# Patient Record
Sex: MALE | Race: WHITE | Employment: FULL TIME | URBAN - METROPOLITAN AREA
[De-identification: names, ages, dates, MRNs, and addresses within clinical notes are randomized per-mention and may not be internally consistent; named-entity substitution may affect disease eponyms.]

---

## 2017-01-20 ENCOUNTER — TELEPHONE (OUTPATIENT)
Dept: SURGERY | Facility: CLINIC | Age: 77
End: 2017-01-20

## 2017-01-20 NOTE — TELEPHONE ENCOUNTER
Patient lost his paperwork,and he needs the information again, who dr Garcia He want him to see and what test

## 2017-01-20 NOTE — TELEPHONE ENCOUNTER
Spoke with pt and informed him of the instructions in DARÍO's lov note and explained that he will have to come into the lab to p/u the collection bottle for the 24 hr urine collcetion and then make sure that he completes the collection at least 1 week before

## 2017-02-15 NOTE — TELEPHONE ENCOUNTER
LUCILA please schedule appointment. Per Dr. Arthur Thrasher patient needs appointment before next refill.

## 2017-02-20 ENCOUNTER — APPOINTMENT (OUTPATIENT)
Dept: LAB | Facility: HOSPITAL | Age: 77
End: 2017-02-20
Attending: UROLOGY
Payer: COMMERCIAL

## 2017-02-20 PROCEDURE — 84156 ASSAY OF PROTEIN URINE: CPT | Performed by: UROLOGY

## 2017-02-20 PROCEDURE — 82570 ASSAY OF URINE CREATININE: CPT | Performed by: UROLOGY

## 2017-02-20 PROCEDURE — 84154 ASSAY OF PSA FREE: CPT | Performed by: UROLOGY

## 2017-02-20 PROCEDURE — 84153 ASSAY OF PSA TOTAL: CPT | Performed by: UROLOGY

## 2017-03-01 ENCOUNTER — OFFICE VISIT (OUTPATIENT)
Dept: SURGERY | Facility: CLINIC | Age: 77
End: 2017-03-01

## 2017-03-01 VITALS
SYSTOLIC BLOOD PRESSURE: 116 MMHG | HEIGHT: 68 IN | WEIGHT: 174 LBS | BODY MASS INDEX: 26.37 KG/M2 | RESPIRATION RATE: 16 BRPM | DIASTOLIC BLOOD PRESSURE: 67 MMHG | HEART RATE: 60 BPM

## 2017-03-01 DIAGNOSIS — N52.01 ERECTILE DYSFUNCTION DUE TO ARTERIAL INSUFFICIENCY: ICD-10-CM

## 2017-03-01 DIAGNOSIS — N40.1 BENIGN NON-NODULAR PROSTATIC HYPERPLASIA WITH LOWER URINARY TRACT SYMPTOMS: ICD-10-CM

## 2017-03-01 DIAGNOSIS — R97.20 ELEVATED PSA: ICD-10-CM

## 2017-03-01 DIAGNOSIS — R80.9 PROTEINURIA, UNSPECIFIED TYPE: Primary | ICD-10-CM

## 2017-03-01 PROCEDURE — 99214 OFFICE O/P EST MOD 30 MIN: CPT | Performed by: UROLOGY

## 2017-03-01 PROCEDURE — 99213 OFFICE O/P EST LOW 20 MIN: CPT | Performed by: UROLOGY

## 2017-03-01 RX ORDER — TAMSULOSIN HYDROCHLORIDE 0.4 MG/1
CAPSULE ORAL
Refills: 3 | COMMUNITY
Start: 2017-02-17 | End: 2018-10-01

## 2017-03-01 NOTE — PROGRESS NOTES
HPI:    Patient ID: Creig Canavan is a 68year old male. HPI     1. Nocturia  Patient's AUA score today was 4, mild voiding dysfunction category the same as AUA 4 on chart review (8/31/2016). The patient has weak stream and nocturia 1 per night.   The zone, adjacent to the peripheral zone--had increased blood flow.   benign prostatic tissue and some of biopsy showed  mild chronic inflammation or mild acute and chronic inflammation.  4  negative prostate biopsies in the past, namely in 2003 and then July 90 tablet Rfl: 3   Tadalafil (CIALIS) 20 MG Oral Tab Please take 1-2 hours before planned sexual activity;  do not take with alcohol.  Disp: 10 tablet Rfl: 11   metoprolol Tartrate 25 MG Oral Tab  Disp:  Rfl:    valsartan 320 MG Oral Tab TAKE 1 TABLET BY MO 09/12/2015            ASSESSMENT/PLAN:   1. (R80.9) Proteinuria, unspecified type  (primary encounter diagnosis)  Plan: 8/71/1261 metabolic 24 hour urine protein was 277.2 (mildly elevated; range is 0.0-150.0). Continues elevated, but not severe.  I discus benefits and risks of these medications; the patient understands and would like to continue. I fully discussed benefits, risks and alternatives to the treatment plan. Patient Instructions and Treatment Plan    1.   Continue finasteride 5 mg daily

## 2017-03-01 NOTE — PATIENT INSTRUCTIONS
1.  Continue finasteride 5 mg daily    2. Continue tamsulosin 0.4 mg daily    3. Visit in 6 months. Blood draw for PSA--total and free a few days before the visit.   Please abstain from any and all types of sexual activity for 5 days before the PSA blood

## 2017-03-08 ENCOUNTER — TELEPHONE (OUTPATIENT)
Dept: FAMILY MEDICINE CLINIC | Facility: CLINIC | Age: 77
End: 2017-03-08

## 2017-03-08 ENCOUNTER — PRIOR ORIGINAL RECORDS (OUTPATIENT)
Dept: OTHER | Age: 77
End: 2017-03-08

## 2017-03-08 DIAGNOSIS — I25.10 CORONARY ARTERY DISEASE DUE TO LIPID RICH PLAQUE: Primary | ICD-10-CM

## 2017-03-08 DIAGNOSIS — I25.83 CORONARY ARTERY DISEASE DUE TO LIPID RICH PLAQUE: Primary | ICD-10-CM

## 2017-03-08 NOTE — TELEPHONE ENCOUNTER
Dr. Kerry Kan, for Dr. Shalonda Yates, Pt is currently in Dr. Vazquez's/Cardiology office.  States is there for a routine follow up and was informed needing a referral. Informed pt Dr. Shalonda Yates is out of office today and pt requesting if referral can be signed by other phys

## 2017-03-08 NOTE — TELEPHONE ENCOUNTER
Pt would like a referral to see Dr. Vazquez's/Cardiology. Per pt he is at Dr. Yeni Valadez office for follow up on his stent. Trasferred call to [employer].

## 2017-03-13 RX ORDER — HYDROCHLOROTHIAZIDE 25 MG/1
TABLET ORAL
Qty: 30 TABLET | Refills: 0 | Status: SHIPPED | OUTPATIENT
Start: 2017-03-13 | End: 2017-03-16

## 2017-03-13 NOTE — TELEPHONE ENCOUNTER
Rx request for Hydrochlorothiazide 25 mg. Rx filled per protocol. Reminder call placed to pt for f/u appt for additional refills.      Hypertensive Medications  Protocol Criteria:  · Appointment scheduled in the past 6 months or in the next 3 months  · BMP

## 2017-03-23 RX ORDER — HYDROCHLOROTHIAZIDE 25 MG/1
TABLET ORAL
Qty: 30 TABLET | Refills: 0 | Status: SHIPPED | OUTPATIENT
Start: 2017-03-23 | End: 2017-03-30

## 2017-03-23 NOTE — TELEPHONE ENCOUNTER
Hypertensive Medications  Protocol Criteria:  · Appointment scheduled in the past 6 months or in the next 3 months  · BMP or CMP in the past 12 months  · Creatinine result < 2  Recent Visits       Provider Department Primary Dx    9 months ago Ladonna Coles

## 2017-03-24 ENCOUNTER — TELEPHONE (OUTPATIENT)
Dept: FAMILY MEDICINE CLINIC | Facility: CLINIC | Age: 77
End: 2017-03-24

## 2017-03-24 RX ORDER — VALSARTAN 320 MG/1
TABLET ORAL
Qty: 30 TABLET | Refills: 0 | Status: SHIPPED | OUTPATIENT
Start: 2017-03-24 | End: 2017-03-30

## 2017-03-24 RX ORDER — AMLODIPINE BESYLATE 5 MG/1
5 TABLET ORAL DAILY
Qty: 30 TABLET | Refills: 0 | Status: SHIPPED | OUTPATIENT
Start: 2017-03-24 | End: 2017-03-30

## 2017-03-24 RX ORDER — AMLODIPINE BESYLATE 5 MG/1
TABLET ORAL
Qty: 90 TABLET | Refills: 0 | Status: SHIPPED | OUTPATIENT
Start: 2017-03-24 | End: 2017-03-30

## 2017-03-24 NOTE — TELEPHONE ENCOUNTER
LUCILA- Please call and schedule an appointment for patient with Dr. Tiny Delcid. See message below.

## 2017-03-27 RX ORDER — VALSARTAN 320 MG/1
TABLET ORAL
Qty: 90 TABLET | Refills: 0 | Status: SHIPPED | OUTPATIENT
Start: 2017-03-27 | End: 2017-03-30

## 2017-03-28 NOTE — TELEPHONE ENCOUNTER
Hypertensive Medications  Protocol Criteria:  · Appointment scheduled in the past 6 months or in the next 3 months  · BMP or CMP in the past 12 months  · Creatinine result < 2  Recent Visits       Provider Department Primary Dx    9 months ago Maxine Aguirre

## 2017-03-30 ENCOUNTER — OFFICE VISIT (OUTPATIENT)
Dept: FAMILY MEDICINE CLINIC | Facility: CLINIC | Age: 77
End: 2017-03-30

## 2017-03-30 VITALS
SYSTOLIC BLOOD PRESSURE: 134 MMHG | WEIGHT: 168 LBS | BODY MASS INDEX: 25.46 KG/M2 | TEMPERATURE: 97 F | HEIGHT: 68 IN | DIASTOLIC BLOOD PRESSURE: 58 MMHG | HEART RATE: 67 BPM | RESPIRATION RATE: 14 BRPM

## 2017-03-30 DIAGNOSIS — Z00.00 ADULT GENERAL MEDICAL EXAM: Primary | ICD-10-CM

## 2017-03-30 DIAGNOSIS — E78.2 MIXED HYPERLIPIDEMIA: ICD-10-CM

## 2017-03-30 DIAGNOSIS — N40.1 BENIGN NON-NODULAR PROSTATIC HYPERPLASIA WITH LOWER URINARY TRACT SYMPTOMS: ICD-10-CM

## 2017-03-30 DIAGNOSIS — I10 ESSENTIAL HYPERTENSION, BENIGN: ICD-10-CM

## 2017-03-30 DIAGNOSIS — I25.10 CORONARY ARTERY DISEASE INVOLVING NATIVE CORONARY ARTERY OF NATIVE HEART WITHOUT ANGINA PECTORIS: ICD-10-CM

## 2017-03-30 DIAGNOSIS — E11.9 CONTROLLED TYPE 2 DIABETES MELLITUS WITHOUT COMPLICATION, WITHOUT LONG-TERM CURRENT USE OF INSULIN (HCC): ICD-10-CM

## 2017-03-30 PROCEDURE — 99397 PER PM REEVAL EST PAT 65+ YR: CPT | Performed by: FAMILY MEDICINE

## 2017-03-30 RX ORDER — ATORVASTATIN CALCIUM 80 MG/1
80 TABLET, FILM COATED ORAL
Qty: 90 TABLET | Refills: 3 | Status: SHIPPED | OUTPATIENT
Start: 2017-03-30 | End: 2017-10-02

## 2017-03-30 RX ORDER — AMLODIPINE BESYLATE 5 MG/1
TABLET ORAL
Qty: 90 TABLET | Refills: 3 | Status: SHIPPED | OUTPATIENT
Start: 2017-03-30 | End: 2017-10-02

## 2017-03-30 RX ORDER — HYDROCHLOROTHIAZIDE 25 MG/1
TABLET ORAL
Qty: 90 TABLET | Refills: 3 | Status: SHIPPED | OUTPATIENT
Start: 2017-03-30 | End: 2017-10-02

## 2017-03-30 RX ORDER — VALSARTAN 320 MG/1
TABLET ORAL
Qty: 90 TABLET | Refills: 3 | Status: SHIPPED | OUTPATIENT
Start: 2017-03-30 | End: 2017-10-02

## 2017-03-30 NOTE — PROGRESS NOTES
Patient ID: Nelson Welch is a 68year old male. HPI  Patient presents with:  Routine Physical    He quit tobacco and alcohol in 2007. He is . He is doing very well. He sees his cardiologist and urologist yearly.   He does have diabetes but PHURINE 6.0 08/31/2016   PROUR 100* 08/31/2016   UROBILINOGEN <2.0 08/31/2016   NITRITE Negative 08/31/2016   LEUUR Negative 08/31/2016   ASCACIDURINE Negative 08/31/2016   WBCUR 1 08/31/2016   RBCUR 1 08/31/2016   HYALCASTURN 1 08/31/2016   BACUR Few* 0 Genitourinary: Negative for hematuria and difficulty urinating. Musculoskeletal: Negative for joint swelling. Skin: Negative for rash. Allergic/Immunologic: Negative for environmental allergies and food allergies.    Neurological: Negative for dizzi EACH DAY Disp:  Rfl: 3   METFORMIN  MG Oral Tab TAKE 1 TABLET BY MOUTH DAILY WITH DINNER. Disp: 90 tablet Rfl: 0   finasteride 5 MG Oral Tab Take 1 tablet (5 mg total) by mouth once daily.  Disp: 90 tablet Rfl: 3   Tadalafil (CIALIS) 20 MG Oral Tab P (76.204 kg)        Blood pressure 152/77, pulse 67, temperature 97.3 °F (36.3 °C), temperature source Oral, resp. rate 14, height 5' 8\" (1.727 m), weight 168 lb (76.204 kg).          ASSESSMENT/PLAN:     Diagnoses and all orders for this visit:    Adult chelsea

## 2017-04-01 ENCOUNTER — PRIOR ORIGINAL RECORDS (OUTPATIENT)
Dept: OTHER | Age: 77
End: 2017-04-01

## 2017-04-01 ENCOUNTER — LAB ENCOUNTER (OUTPATIENT)
Dept: LAB | Age: 77
End: 2017-04-01
Attending: FAMILY MEDICINE
Payer: COMMERCIAL

## 2017-04-01 DIAGNOSIS — R97.20 ELEVATED PSA: ICD-10-CM

## 2017-04-01 DIAGNOSIS — E11.9 CONTROLLED TYPE 2 DIABETES MELLITUS WITHOUT COMPLICATION, WITHOUT LONG-TERM CURRENT USE OF INSULIN (HCC): ICD-10-CM

## 2017-04-01 PROCEDURE — 80061 LIPID PANEL: CPT

## 2017-04-01 PROCEDURE — 80048 BASIC METABOLIC PNL TOTAL CA: CPT

## 2017-04-01 PROCEDURE — 36415 COLL VENOUS BLD VENIPUNCTURE: CPT

## 2017-04-01 PROCEDURE — 83036 HEMOGLOBIN GLYCOSYLATED A1C: CPT

## 2017-04-01 PROCEDURE — 85025 COMPLETE CBC W/AUTO DIFF WBC: CPT

## 2017-04-01 PROCEDURE — 82043 UR ALBUMIN QUANTITATIVE: CPT

## 2017-04-01 PROCEDURE — 84450 TRANSFERASE (AST) (SGOT): CPT

## 2017-04-01 PROCEDURE — 84154 ASSAY OF PSA FREE: CPT

## 2017-04-01 PROCEDURE — 82570 ASSAY OF URINE CREATININE: CPT

## 2017-04-01 PROCEDURE — 84460 ALANINE AMINO (ALT) (SGPT): CPT

## 2017-04-01 PROCEDURE — 84153 ASSAY OF PSA TOTAL: CPT

## 2017-04-13 DIAGNOSIS — R97.20 ELEVATED PSA: Primary | ICD-10-CM

## 2017-04-18 ENCOUNTER — PRIOR ORIGINAL RECORDS (OUTPATIENT)
Dept: OTHER | Age: 77
End: 2017-04-18

## 2017-04-18 ENCOUNTER — MYAURORA ACCOUNT LINK (OUTPATIENT)
Dept: OTHER | Age: 77
End: 2017-04-18

## 2017-04-18 LAB
ALT (SGPT): 16 U/L
AST (SGOT): 17 U/L
BUN: 12 MG/DL
CALCIUM: 9.2 MG/DL
CHLORIDE: 102 MEQ/L
CHOLESTEROL, TOTAL: 82 MG/DL
CREATININE, SERUM: 0.71 MG/DL
GLUCOSE: 96 MG/DL
GLUCOSE: 96 MG/DL
HDL CHOLESTEROL: 36 MG/DL
HEMATOCRIT: 39.5 %
HEMOGLOBIN A1C: 6.3 %
HEMOGLOBIN: 12.8 G/DL
LDL CHOLESTEROL: 35 MG/DL
NON-HDL CHOLESTEROL: 46 MG/DL
PLATELETS: 215 K/UL
POTASSIUM, SERUM: 3.8 MEQ/L
RED BLOOD COUNT: 4.78 X 10-6/U
SODIUM: 141 MEQ/L
TRIGLYCERIDES: 57 MG/DL
WHITE BLOOD COUNT: 8.3 X 10-3/U

## 2017-05-16 RX ORDER — HYDROCHLOROTHIAZIDE 25 MG/1
TABLET ORAL
Qty: 30 TABLET | Refills: 0 | OUTPATIENT
Start: 2017-05-16

## 2017-07-03 ENCOUNTER — TELEPHONE (OUTPATIENT)
Dept: FAMILY MEDICINE CLINIC | Facility: CLINIC | Age: 77
End: 2017-07-03

## 2017-07-03 DIAGNOSIS — I25.10 CORONARY ARTERY DISEASE INVOLVING NATIVE CORONARY ARTERY OF NATIVE HEART WITHOUT ANGINA PECTORIS: ICD-10-CM

## 2017-07-03 NOTE — TELEPHONE ENCOUNTER
Pt would like to speak with the doctor or RN about his metoprolol Medication. Pt states that he takes 1 tablet in the morning and one tablet in the evening. Per pt the pharmacy states that he takes 1/2 in the morning and 1/2 in the evening.  Pt would like t

## 2017-07-06 NOTE — TELEPHONE ENCOUNTER
He should be on one half tablet in the morning and one half tablet at night of the metoprolol. Have him do this from now on. I will see him in 1 month as I want to make sure his blood pressure is controlled.

## 2017-07-06 NOTE — TELEPHONE ENCOUNTER
Pt stts has been taking 1 tablet of Metoprolol BID but pharmacist stts he should be taking only 1/2 pill BID. Pt would like this clarified. BP running normal.Please advise. Pt also needs a refill please.

## 2017-07-07 ENCOUNTER — TELEPHONE (OUTPATIENT)
Dept: INTERNAL MEDICINE CLINIC | Facility: CLINIC | Age: 77
End: 2017-07-07

## 2017-07-07 NOTE — TELEPHONE ENCOUNTER
LMTCB    Please transfer [id number] anytime. Thank you. To relay MD message below and make 1 month f/u visit.  Pt has future OV appt 10/2/17

## 2017-07-07 NOTE — TELEPHONE ENCOUNTER
Spoke with patient (identified name and ), results reviewed and agrees with plan.   Prescription refilled and appointment given for  at 9:10 am.

## 2017-07-08 DIAGNOSIS — N52.01 ERECTILE DYSFUNCTION DUE TO ARTERIAL INSUFFICIENCY: ICD-10-CM

## 2017-07-08 DIAGNOSIS — R80.9 PROTEINURIA: ICD-10-CM

## 2017-07-08 DIAGNOSIS — R35.1 NOCTURIA: ICD-10-CM

## 2017-07-08 DIAGNOSIS — R97.20 ELEVATED PSA: ICD-10-CM

## 2017-07-08 DIAGNOSIS — N40.1 BENIGN NON-NODULAR PROSTATIC HYPERPLASIA WITH LOWER URINARY TRACT SYMPTOMS: ICD-10-CM

## 2017-07-10 RX ORDER — FINASTERIDE 5 MG/1
5 TABLET, FILM COATED ORAL
Qty: 90 TABLET | Refills: 3 | Status: SHIPPED | OUTPATIENT
Start: 2017-07-10 | End: 2018-07-03

## 2017-07-10 RX ORDER — TAMSULOSIN HYDROCHLORIDE 0.4 MG/1
0.4 CAPSULE ORAL DAILY
Qty: 90 CAPSULE | Refills: 3 | Status: SHIPPED | OUTPATIENT
Start: 2017-07-10 | End: 2018-07-03

## 2017-07-10 NOTE — TELEPHONE ENCOUNTER
Dr. Elias Becerra, pt LOV 3/1/7 and pt pharmacy requesting refills on tamsulosin and finasteride if you agree please review and sign med, thank you!

## 2017-08-12 ENCOUNTER — LAB ENCOUNTER (OUTPATIENT)
Dept: LAB | Age: 77
End: 2017-08-12
Attending: FAMILY MEDICINE
Payer: COMMERCIAL

## 2017-08-12 ENCOUNTER — OFFICE VISIT (OUTPATIENT)
Dept: FAMILY MEDICINE CLINIC | Facility: CLINIC | Age: 77
End: 2017-08-12

## 2017-08-12 VITALS
BODY MASS INDEX: 25.43 KG/M2 | HEIGHT: 68 IN | DIASTOLIC BLOOD PRESSURE: 79 MMHG | SYSTOLIC BLOOD PRESSURE: 141 MMHG | TEMPERATURE: 99 F | HEART RATE: 58 BPM | WEIGHT: 167.81 LBS | RESPIRATION RATE: 14 BRPM

## 2017-08-12 DIAGNOSIS — E11.9 CONTROLLED TYPE 2 DIABETES MELLITUS WITHOUT COMPLICATION, WITHOUT LONG-TERM CURRENT USE OF INSULIN (HCC): Primary | ICD-10-CM

## 2017-08-12 DIAGNOSIS — R97.20 ELEVATED PSA: ICD-10-CM

## 2017-08-12 DIAGNOSIS — D64.9 ANEMIA, UNSPECIFIED TYPE: ICD-10-CM

## 2017-08-12 DIAGNOSIS — E11.9 CONTROLLED TYPE 2 DIABETES MELLITUS WITHOUT COMPLICATION, WITHOUT LONG-TERM CURRENT USE OF INSULIN (HCC): ICD-10-CM

## 2017-08-12 LAB
ALT SERPL-CCNC: 18 U/L (ref 17–63)
ANION GAP SERPL CALC-SCNC: 7 MMOL/L (ref 0–18)
AST SERPL-CCNC: 18 U/L (ref 15–41)
BASOPHILS # BLD: 0.1 K/UL (ref 0–0.2)
BASOPHILS NFR BLD: 1 %
BUN SERPL-MCNC: 12 MG/DL (ref 8–20)
BUN/CREAT SERPL: 13.8 (ref 10–20)
CALCIUM SERPL-MCNC: 9.1 MG/DL (ref 8.5–10.5)
CHLORIDE SERPL-SCNC: 102 MMOL/L (ref 95–110)
CO2 SERPL-SCNC: 31 MMOL/L (ref 22–32)
CREAT SERPL-MCNC: 0.87 MG/DL (ref 0.5–1.5)
EOSINOPHIL # BLD: 0.4 K/UL (ref 0–0.7)
EOSINOPHIL NFR BLD: 5 %
ERYTHROCYTE [DISTWIDTH] IN BLOOD BY AUTOMATED COUNT: 15.3 % (ref 11–15)
FERRITIN SERPL IA-MCNC: 52 NG/ML (ref 24–336)
GLUCOSE SERPL-MCNC: 99 MG/DL (ref 70–99)
HCT VFR BLD AUTO: 40.2 % (ref 41–52)
HGB BLD-MCNC: 13.5 G/DL (ref 13.5–17.5)
IRON SATN MFR SERPL: 11 % (ref 20–55)
IRON SERPL-MCNC: 37 MCG/DL (ref 45–182)
LYMPHOCYTES # BLD: 1.4 K/UL (ref 1–4)
LYMPHOCYTES NFR BLD: 17 %
MCH RBC QN AUTO: 27.6 PG (ref 27–32)
MCHC RBC AUTO-ENTMCNC: 33.5 G/DL (ref 32–37)
MCV RBC AUTO: 82.2 FL (ref 80–100)
MONOCYTES # BLD: 0.7 K/UL (ref 0–1)
MONOCYTES NFR BLD: 9 %
NEUTROPHILS # BLD AUTO: 5.6 K/UL (ref 1.8–7.7)
NEUTROPHILS NFR BLD: 68 %
OSMOLALITY UR CALC.SUM OF ELEC: 290 MOSM/KG (ref 275–295)
PLATELET # BLD AUTO: 229 K/UL (ref 140–400)
PMV BLD AUTO: 9.1 FL (ref 7.4–10.3)
POTASSIUM SERPL-SCNC: 3.9 MMOL/L (ref 3.3–5.1)
PSA FREE MFR SERPL: 31 %
PSA FREE SERPL-MCNC: 0.62 NG/ML
PSA SERPL-MCNC: 2 NG/ML (ref 0–4)
RBC # BLD AUTO: 4.89 M/UL (ref 4.5–5.9)
SODIUM SERPL-SCNC: 140 MMOL/L (ref 136–144)
TIBC SERPL-MCNC: 346 MCG/DL (ref 228–428)
TRANSFERRIN SERPL-MCNC: 262 MG/DL (ref 180–329)
WBC # BLD AUTO: 8.2 K/UL (ref 4–11)

## 2017-08-12 PROCEDURE — 99214 OFFICE O/P EST MOD 30 MIN: CPT | Performed by: FAMILY MEDICINE

## 2017-08-12 PROCEDURE — 84154 ASSAY OF PSA FREE: CPT

## 2017-08-12 PROCEDURE — 84466 ASSAY OF TRANSFERRIN: CPT

## 2017-08-12 PROCEDURE — 84153 ASSAY OF PSA TOTAL: CPT

## 2017-08-12 PROCEDURE — 85025 COMPLETE CBC W/AUTO DIFF WBC: CPT

## 2017-08-12 PROCEDURE — 84450 TRANSFERASE (AST) (SGOT): CPT

## 2017-08-12 PROCEDURE — 84460 ALANINE AMINO (ALT) (SGPT): CPT

## 2017-08-12 PROCEDURE — 80048 BASIC METABOLIC PNL TOTAL CA: CPT

## 2017-08-12 PROCEDURE — 83036 HEMOGLOBIN GLYCOSYLATED A1C: CPT

## 2017-08-12 PROCEDURE — 36415 COLL VENOUS BLD VENIPUNCTURE: CPT

## 2017-08-12 PROCEDURE — 83540 ASSAY OF IRON: CPT

## 2017-08-12 PROCEDURE — 99212 OFFICE O/P EST SF 10 MIN: CPT | Performed by: FAMILY MEDICINE

## 2017-08-12 PROCEDURE — 82728 ASSAY OF FERRITIN: CPT

## 2017-08-12 NOTE — PROGRESS NOTES
Patient ID: Gio Guzman is a 68year old male. HPI  Patient presents with:  Diabetes  Hyperlipidemia  Hypertension    He had a colonoscopy in the past and states he is up-to-date. He does not notice any blood in the stool or black stools.   He stat COLORUR Yellow 08/31/2016   CLARITY Clear 08/31/2016   SPECGRAVITY 1.019 08/31/2016   GLUUR Negative 08/31/2016   BILUR Negative 08/31/2016   KETUR Negative 08/31/2016   BLOODURINE Negative 08/31/2016   PHURINE 6.0 08/31/2016   PROUR 100 (A) 08/31/2016 Negative for appetite change, diaphoresis, fatigue and fever. Respiratory: Negative for cough, chest tightness and shortness of breath.     Neurological: Negative for dizziness, tremors, syncope, facial asymmetry, speech difficulty, weakness, numbness and Oral Tab Take 1 tablet by mouth 2 (two) times daily. Disp: 180 tablet Rfl: 0     Allergies:No Known Allergies   PHYSICAL EXAM:   Physical Exam  Blood pressure 141/79, pulse 58, temperature 98.9 °F (37.2 °C), temperature source Oral, resp.  rate 14, height 5 member understands and agrees to plan.          Valentino Bali, DO  8/12/2017

## 2017-08-13 LAB — HBA1C MFR BLD: 6.3 % (ref 4–6)

## 2017-09-01 ENCOUNTER — OFFICE VISIT (OUTPATIENT)
Dept: SURGERY | Facility: CLINIC | Age: 77
End: 2017-09-01

## 2017-09-01 VITALS
HEART RATE: 50 BPM | DIASTOLIC BLOOD PRESSURE: 73 MMHG | SYSTOLIC BLOOD PRESSURE: 145 MMHG | RESPIRATION RATE: 18 BRPM | TEMPERATURE: 98 F

## 2017-09-01 DIAGNOSIS — R35.1 NOCTURIA: ICD-10-CM

## 2017-09-01 DIAGNOSIS — N40.1 BENIGN PROSTATIC HYPERPLASIA WITH URINARY FREQUENCY: ICD-10-CM

## 2017-09-01 DIAGNOSIS — R35.0 BENIGN PROSTATIC HYPERPLASIA WITH URINARY FREQUENCY: ICD-10-CM

## 2017-09-01 DIAGNOSIS — R97.20 ELEVATED PSA: Primary | ICD-10-CM

## 2017-09-01 DIAGNOSIS — N52.01 ERECTILE DYSFUNCTION DUE TO ARTERIAL INSUFFICIENCY: ICD-10-CM

## 2017-09-01 DIAGNOSIS — R80.9 PROTEINURIA, UNSPECIFIED TYPE: ICD-10-CM

## 2017-09-01 PROCEDURE — 99212 OFFICE O/P EST SF 10 MIN: CPT | Performed by: UROLOGY

## 2017-09-01 PROCEDURE — 99214 OFFICE O/P EST MOD 30 MIN: CPT | Performed by: UROLOGY

## 2017-09-01 NOTE — PATIENT INSTRUCTIONS
1.  Continue tamsulosin 0.4 mg daily    2. Continue finasteride 5 mg daily    3. You have decided to follow the recommendations of the American urologic Association and stop PSA testing because they do not recommend PSA testing after the age of 71.   Your prevent problems. Several tests can diagnose BPH. These include:  · Digital rectal exam. During this procedure, your provider puts a gloved, greased (lubricated) finger into your rectum to check the size of your prostate. · Imaging tests.  X-rays and othe on your age, overall health, and other factors. BPH and Prostate Cancer  BPH and prostate cancer share some symptoms. That’s why it’s important to talk with your provider about your symptoms. Men with BPH aren’t more likely to develop this cancer.  But the

## 2017-09-01 NOTE — PROGRESS NOTES
HPI:    Patient ID: Ginna Renteriapool is a 68year old male. HPI     1. Nocturia  Patient's AUA score today was 1, beginning of the mild voiding dysfunction category, better than the score of 4 that he had last visit 3/1/17 on chart review.   The patient h of prostate [address] [place] [address] February 2, 2009--- volume was 86.1 cc, slightly increased compared to [address] [place] [address] ultrasound of 2008 when it was 79.7 cc.  1.8 cm hypoechoic area left transition zone, adjacent to the peripheral zone--had increased blood flow.   benign prostatic t Disp: 4 patch Rfl: 6   valsartan 320 MG Oral Tab TAKE 1 TABLET BY MOUTH ONCE DAILY. Disp: 90 tablet Rfl: 3   AmLODIPine Besylate 5 MG Oral Tab TAKE 1 TABLET (5 MG TOTAL) BY MOUTH ONCE DAILY.  Disp: 90 tablet Rfl: 3   hydrochlorothiazide 25 MG Oral Tab TAKE 8%.  4/34/7080 metabolic 24 hour urine protein = 277.2 (mildly elevated; range is 0.0-150.0)  2/20/2017 PSA = 2.1 x 2 = 4.2 (adjusted for Finasteride),   34 % free PSA (8 % probability of prostate cancer)  10/18/2014 PSA = 3.0 x 2 = 6.0 (adjusted for Finas with lower urinary tract symptoms  Plan: On CHELLE today, the prostate was 45-50 grams, 3-4+ enlarged, no nodules. Problem is stable on finasteride. I discussed benefits and risks of Finasteride; the patient understands and would like to continue.      4. (N .    Meds This Visit:    No prescriptions requested or ordered in this encounter    Imaging & Referrals:  None

## 2017-09-05 ENCOUNTER — APPOINTMENT (OUTPATIENT)
Dept: LAB | Age: 77
End: 2017-09-05
Attending: FAMILY MEDICINE
Payer: COMMERCIAL

## 2017-09-05 DIAGNOSIS — D64.9 ANEMIA, UNSPECIFIED TYPE: ICD-10-CM

## 2017-09-05 PROCEDURE — 82274 ASSAY TEST FOR BLOOD FECAL: CPT

## 2017-09-08 LAB — HEMOCCULT STL QL: NEGATIVE

## 2017-10-02 ENCOUNTER — OFFICE VISIT (OUTPATIENT)
Dept: FAMILY MEDICINE CLINIC | Facility: CLINIC | Age: 77
End: 2017-10-02

## 2017-10-02 VITALS
TEMPERATURE: 98 F | HEART RATE: 56 BPM | WEIGHT: 165 LBS | HEIGHT: 68 IN | DIASTOLIC BLOOD PRESSURE: 58 MMHG | BODY MASS INDEX: 25.01 KG/M2 | SYSTOLIC BLOOD PRESSURE: 124 MMHG

## 2017-10-02 DIAGNOSIS — L85.3 XEROSIS OF SKIN: ICD-10-CM

## 2017-10-02 DIAGNOSIS — I10 ESSENTIAL HYPERTENSION, BENIGN: Primary | ICD-10-CM

## 2017-10-02 DIAGNOSIS — L98.9 SKIN LESION OF FACE: ICD-10-CM

## 2017-10-02 DIAGNOSIS — Z23 NEED FOR VACCINATION: ICD-10-CM

## 2017-10-02 DIAGNOSIS — E11.9 CONTROLLED TYPE 2 DIABETES MELLITUS WITHOUT COMPLICATION, WITHOUT LONG-TERM CURRENT USE OF INSULIN (HCC): ICD-10-CM

## 2017-10-02 DIAGNOSIS — E78.2 MIXED HYPERLIPIDEMIA: ICD-10-CM

## 2017-10-02 DIAGNOSIS — R51.9 PAIN IN FACE: ICD-10-CM

## 2017-10-02 DIAGNOSIS — I25.10 CORONARY ARTERY DISEASE INVOLVING NATIVE CORONARY ARTERY OF NATIVE HEART WITHOUT ANGINA PECTORIS: ICD-10-CM

## 2017-10-02 PROCEDURE — 90653 IIV ADJUVANT VACCINE IM: CPT | Performed by: FAMILY MEDICINE

## 2017-10-02 PROCEDURE — 90471 IMMUNIZATION ADMIN: CPT | Performed by: FAMILY MEDICINE

## 2017-10-02 PROCEDURE — 90472 IMMUNIZATION ADMIN EACH ADD: CPT | Performed by: FAMILY MEDICINE

## 2017-10-02 PROCEDURE — 90670 PCV13 VACCINE IM: CPT | Performed by: FAMILY MEDICINE

## 2017-10-02 PROCEDURE — 99214 OFFICE O/P EST MOD 30 MIN: CPT | Performed by: FAMILY MEDICINE

## 2017-10-02 RX ORDER — ATORVASTATIN CALCIUM 80 MG/1
80 TABLET, FILM COATED ORAL
Qty: 90 TABLET | Refills: 3 | Status: SHIPPED | OUTPATIENT
Start: 2017-10-02 | End: 2018-02-21

## 2017-10-02 RX ORDER — HYDROCHLOROTHIAZIDE 25 MG/1
TABLET ORAL
Qty: 90 TABLET | Refills: 3 | Status: SHIPPED | OUTPATIENT
Start: 2017-10-02 | End: 2018-05-26

## 2017-10-02 RX ORDER — AMLODIPINE BESYLATE 5 MG/1
TABLET ORAL
Qty: 90 TABLET | Refills: 3 | Status: SHIPPED | OUTPATIENT
Start: 2017-10-02 | End: 2018-05-26

## 2017-10-02 RX ORDER — VALSARTAN 320 MG/1
TABLET ORAL
Qty: 90 TABLET | Refills: 3 | Status: SHIPPED | OUTPATIENT
Start: 2017-10-02 | End: 2018-05-26

## 2017-10-02 NOTE — PROGRESS NOTES
Patient ID: Amy Lindquist is a 68year old male. HPI  Patient presents with:  Hypertension  He is here for his hypertension. He states he is doing very well. He only needs to see his cardiologist yearly now. He has 2 stents.   He has had a colonosc MOUTH ONCE DAILY. Disp: 90 tablet Rfl: 3   hydrochlorothiazide 25 MG Oral Tab TAKE 1 TABLET (25 MG TOTAL) BY MOUTH DAILY. Disp: 90 tablet Rfl: 3   MetFORMIN HCl 500 MG Oral Tab TAKE 1 TABLET BY MOUTH DAILY WITH DINNER.  Disp: 90 tablet Rfl: 3   Atorvastatin Diagnoses and all orders for this visit:    Essential hypertension, benign  -     CloNIDine HCl 0.1 MG/24HR Transdermal Patch Weekly; APPLY 1 PATCH WEEKLY AS DIRECTED.  -     valsartan 320 MG Oral Tab; TAKE 1 TABLET BY MOUTH ONCE DAILY.   -     AmLODIPi

## 2017-10-03 ENCOUNTER — OFFICE VISIT (OUTPATIENT)
Dept: OTOLARYNGOLOGY | Facility: CLINIC | Age: 77
End: 2017-10-03

## 2017-10-03 VITALS
DIASTOLIC BLOOD PRESSURE: 54 MMHG | BODY MASS INDEX: 26.07 KG/M2 | TEMPERATURE: 97 F | HEIGHT: 68 IN | WEIGHT: 172 LBS | SYSTOLIC BLOOD PRESSURE: 134 MMHG

## 2017-10-03 DIAGNOSIS — J34.89 LESION OF NOSE: Primary | ICD-10-CM

## 2017-10-03 PROCEDURE — 99212 OFFICE O/P EST SF 10 MIN: CPT | Performed by: OTOLARYNGOLOGY

## 2017-10-03 PROCEDURE — 11311 SHAVE SKIN LESION 0.6-1.0 CM: CPT | Performed by: OTOLARYNGOLOGY

## 2017-10-03 PROCEDURE — 99243 OFF/OP CNSLTJ NEW/EST LOW 30: CPT | Performed by: OTOLARYNGOLOGY

## 2017-10-03 NOTE — PROGRESS NOTES
Amy Lindquist is a 68year old male. Patient presents with:  Consult: mole right side  of nose       HISTORY OF PRESENT ILLNESS    He presents with a long history of an enlarging lesion of the right eyebrow extending onto the nasal skin.   He is having d irregular heartbeat/palpitations and syncope. GI Negative Abdominal pain and diarrhea. Endocrine Negative Cold intolerance and heat intolerance. Neuro Negative Tremors. Psych Negative Anxiety and depression.    Integumentary Negative Frequent skin i Xylocaine with 1-100,000 epinephrine. Using aseptic technique shave biopsy was performed removing this 1 cm eyebrow lesion. This is sent to pathology in formalin. The base was cauterized with silver nitrate. No complications were noted.       Current Ou Rosalino Mahmood MD    10/3/2017    3:33 PM

## 2018-02-21 DIAGNOSIS — E78.2 MIXED HYPERLIPIDEMIA: ICD-10-CM

## 2018-02-21 NOTE — TELEPHONE ENCOUNTER
Pharmacy calling due to patient has script for     Current Outpatient Prescriptions:  atorvastatin 80 MG Oral Tab Take 1 tablet (80 mg total) by mouth once daily. Disp: 90 tablet Rfl: 3     Ins only covers 90 day so requesting 90 day RX be sent .      Cynthia

## 2018-02-22 RX ORDER — ATORVASTATIN CALCIUM 80 MG/1
80 TABLET, FILM COATED ORAL
Qty: 90 TABLET | Refills: 0 | Status: SHIPPED | OUTPATIENT
Start: 2018-02-22 | End: 2018-05-26

## 2018-02-22 NOTE — TELEPHONE ENCOUNTER
Refilled per written protocol--for 90-day.     Cholesterol Medications  Protocol Criteria:  · Appointment scheduled in the past 12 months or in the next 3 months  · ALT & LDL on file in the past 12 months  · ALT result < 80  · LDL result <130   Recent Outpa

## 2018-03-15 ENCOUNTER — MYAURORA ACCOUNT LINK (OUTPATIENT)
Dept: OTHER | Age: 78
End: 2018-03-15

## 2018-03-15 ENCOUNTER — TELEPHONE (OUTPATIENT)
Dept: FAMILY MEDICINE CLINIC | Facility: CLINIC | Age: 78
End: 2018-03-15

## 2018-03-15 ENCOUNTER — PRIOR ORIGINAL RECORDS (OUTPATIENT)
Dept: OTHER | Age: 78
End: 2018-03-15

## 2018-03-15 DIAGNOSIS — I25.83 CORONARY ATHEROSCLEROSIS DUE TO LIPID RICH PLAQUE: Primary | ICD-10-CM

## 2018-03-15 DIAGNOSIS — I25.10 CORONARY ATHEROSCLEROSIS DUE TO LIPID RICH PLAQUE: Primary | ICD-10-CM

## 2018-03-15 NOTE — TELEPHONE ENCOUNTER
Pt called and requested a referral to see Dr Russ Flynn. Pended referral.  Please review diagnois and sign off if you approve. Thank you!  Phoebe Hill [phone] [facility]

## 2018-03-17 ENCOUNTER — PRIOR ORIGINAL RECORDS (OUTPATIENT)
Dept: OTHER | Age: 78
End: 2018-03-17

## 2018-03-17 ENCOUNTER — LAB ENCOUNTER (OUTPATIENT)
Dept: LAB | Age: 78
End: 2018-03-17
Attending: INTERNAL MEDICINE
Payer: COMMERCIAL

## 2018-03-17 DIAGNOSIS — E78.2 MIXED HYPERLIPIDEMIA: Primary | ICD-10-CM

## 2018-03-17 LAB
ALT SERPL-CCNC: 22 U/L (ref 17–63)
AST SERPL-CCNC: 20 U/L (ref 15–41)
CHOLEST SERPL-MCNC: 86 MG/DL (ref 110–200)
CK SERPL-CCNC: 82 U/L (ref 49–397)
HDLC SERPL-MCNC: 48 MG/DL
LDLC SERPL CALC-MCNC: 29 MG/DL (ref 0–99)
NONHDLC SERPL-MCNC: 38 MG/DL
TRIGL SERPL-MCNC: 44 MG/DL (ref 1–149)

## 2018-03-17 PROCEDURE — 80061 LIPID PANEL: CPT

## 2018-03-17 PROCEDURE — 82550 ASSAY OF CK (CPK): CPT

## 2018-03-17 PROCEDURE — 36415 COLL VENOUS BLD VENIPUNCTURE: CPT

## 2018-03-17 PROCEDURE — 84460 ALANINE AMINO (ALT) (SGPT): CPT

## 2018-03-17 PROCEDURE — 84450 TRANSFERASE (AST) (SGOT): CPT

## 2018-03-19 LAB
ALT (SGPT): 22 U/L
AST (SGOT): 20 U/L
CHOLESTEROL, TOTAL: 86 MG/DL
CREATININE KINASE: 82 U/L
HDL CHOLESTEROL: 48 MG/DL
LDL CHOLESTEROL: 29 MG/DL
TRIGLYCERIDES: 44 MG/DL

## 2018-03-26 ENCOUNTER — PRIOR ORIGINAL RECORDS (OUTPATIENT)
Dept: OTHER | Age: 78
End: 2018-03-26

## 2018-03-26 DIAGNOSIS — I25.10 CORONARY ARTERY DISEASE INVOLVING NATIVE CORONARY ARTERY OF NATIVE HEART WITHOUT ANGINA PECTORIS: ICD-10-CM

## 2018-03-29 NOTE — TELEPHONE ENCOUNTER
LUCILA- Please call and schedule an appointment for patient with Dr. Renie Closs. See message below.

## 2018-05-26 ENCOUNTER — LAB ENCOUNTER (OUTPATIENT)
Dept: LAB | Age: 78
End: 2018-05-26
Attending: FAMILY MEDICINE
Payer: COMMERCIAL

## 2018-05-26 ENCOUNTER — OFFICE VISIT (OUTPATIENT)
Dept: FAMILY MEDICINE CLINIC | Facility: CLINIC | Age: 78
End: 2018-05-26

## 2018-05-26 VITALS
HEART RATE: 54 BPM | TEMPERATURE: 98 F | HEIGHT: 68 IN | RESPIRATION RATE: 12 BRPM | SYSTOLIC BLOOD PRESSURE: 118 MMHG | WEIGHT: 168.81 LBS | DIASTOLIC BLOOD PRESSURE: 58 MMHG | BODY MASS INDEX: 25.58 KG/M2

## 2018-05-26 DIAGNOSIS — L57.8 SUN-DAMAGED SKIN: ICD-10-CM

## 2018-05-26 DIAGNOSIS — Z11.59 NEED FOR HEPATITIS C SCREENING TEST: ICD-10-CM

## 2018-05-26 DIAGNOSIS — R80.9 CONTROLLED TYPE 2 DIABETES MELLITUS WITH MICROALBUMINURIA, WITHOUT LONG-TERM CURRENT USE OF INSULIN (HCC): ICD-10-CM

## 2018-05-26 DIAGNOSIS — Z00.00 ADULT GENERAL MEDICAL EXAM: ICD-10-CM

## 2018-05-26 DIAGNOSIS — Z00.00 ADULT GENERAL MEDICAL EXAM: Primary | ICD-10-CM

## 2018-05-26 DIAGNOSIS — I10 ESSENTIAL HYPERTENSION, BENIGN: ICD-10-CM

## 2018-05-26 DIAGNOSIS — E78.2 MIXED HYPERLIPIDEMIA: ICD-10-CM

## 2018-05-26 DIAGNOSIS — L57.0 AK (ACTINIC KERATOSIS): ICD-10-CM

## 2018-05-26 DIAGNOSIS — E11.29 CONTROLLED TYPE 2 DIABETES MELLITUS WITH MICROALBUMINURIA, WITHOUT LONG-TERM CURRENT USE OF INSULIN (HCC): ICD-10-CM

## 2018-05-26 DIAGNOSIS — I25.10 CORONARY ARTERY DISEASE INVOLVING NATIVE CORONARY ARTERY OF NATIVE HEART WITHOUT ANGINA PECTORIS: ICD-10-CM

## 2018-05-26 DIAGNOSIS — N40.1 BENIGN NON-NODULAR PROSTATIC HYPERPLASIA WITH LOWER URINARY TRACT SYMPTOMS: ICD-10-CM

## 2018-05-26 PROCEDURE — 86803 HEPATITIS C AB TEST: CPT

## 2018-05-26 PROCEDURE — 85025 COMPLETE CBC W/AUTO DIFF WBC: CPT

## 2018-05-26 PROCEDURE — 82043 UR ALBUMIN QUANTITATIVE: CPT

## 2018-05-26 PROCEDURE — 80048 BASIC METABOLIC PNL TOTAL CA: CPT

## 2018-05-26 PROCEDURE — 99397 PER PM REEVAL EST PAT 65+ YR: CPT | Performed by: FAMILY MEDICINE

## 2018-05-26 PROCEDURE — 36415 COLL VENOUS BLD VENIPUNCTURE: CPT

## 2018-05-26 PROCEDURE — 83036 HEMOGLOBIN GLYCOSYLATED A1C: CPT

## 2018-05-26 PROCEDURE — 82570 ASSAY OF URINE CREATININE: CPT

## 2018-05-26 RX ORDER — HYDROCHLOROTHIAZIDE 25 MG/1
TABLET ORAL
Qty: 90 TABLET | Refills: 3 | Status: SHIPPED | OUTPATIENT
Start: 2018-05-26 | End: 2019-07-29

## 2018-05-26 RX ORDER — ATORVASTATIN CALCIUM 80 MG/1
80 TABLET, FILM COATED ORAL
Qty: 90 TABLET | Refills: 1 | Status: SHIPPED | OUTPATIENT
Start: 2018-05-26 | End: 2018-08-18 | Stop reason: SDUPTHER

## 2018-05-26 RX ORDER — AMLODIPINE BESYLATE 5 MG/1
TABLET ORAL
Qty: 90 TABLET | Refills: 3 | Status: SHIPPED | OUTPATIENT
Start: 2018-05-26 | End: 2019-07-29

## 2018-05-26 RX ORDER — VALSARTAN 320 MG/1
TABLET ORAL
Qty: 90 TABLET | Refills: 3 | Status: SHIPPED | OUTPATIENT
Start: 2018-05-26 | End: 2019-06-15

## 2018-05-26 NOTE — PROGRESS NOTES
Patient ID: Arsenio Segundo is a 68year old male. HPI  Patient presents with:  Diabetes  Hyperlipidemia  Hypertension    He is here for his hypertension. He states he is doing very well. He only needs to see his cardiologist yearly now.   He has 2 st 08/12/2017   GFRNAA >60 08/12/2017   CA 9.1 08/12/2017    08/12/2017   K 3.9 08/12/2017    08/12/2017   CO2 31 08/12/2017   OSMOCALC 290 08/12/2017         Lab Results  Component Value Date   COLORUR Yellow 08/31/2016   CLARITY Clear 08/31/2016 kg)  10/02/17 : 165 lb (74.8 kg)  08/12/17 : 167 lb 12.8 oz (76.1 kg)  03/30/17 : 168 lb (76.2 kg)  03/01/17 : 174 lb (78.9 kg)      Body mass index is 25.67 kg/m².     BP Readings from Last 6 Encounters:  05/26/18 : 142/71  10/03/17 : 134/54  10/02/17 : 12 Rfl: 10   ticagrelor (BRILINTA) 90 MG Oral Tab Take 1 tablet by mouth 2 (two) times daily.  Disp: 180 tablet Rfl: 0     Allergies:No Known Allergies   PHYSICAL EXAM:   Physical Exam  Blood pressure 142/71, pulse 54, temperature 97.9 °F (36.6 °C), temperatur him yearly  Essential hypertension, benign  -     valsartan 320 MG Oral Tab; TAKE 1 TABLET BY MOUTH ONCE DAILY. -     AmLODIPine Besylate 5 MG Oral Tab; TAKE 1 TABLET (5 MG TOTAL) BY MOUTH ONCE DAILY.   -     hydrochlorothiazide 25 MG Oral Tab; TAKE 1 TABL Referral Type: OFFICE VISIT          Referred to Provider: Salud Hernandez MD          Requested Specialty: DERMATOLOGY          Number of Visits Requested: 3      Follow up if symptoms persist.  Take medicine (if given) as prescribed.   Approach to treatm

## 2018-06-08 ENCOUNTER — TELEPHONE (OUTPATIENT)
Dept: FAMILY MEDICINE CLINIC | Facility: CLINIC | Age: 78
End: 2018-06-08

## 2018-06-22 ENCOUNTER — OFFICE VISIT (OUTPATIENT)
Dept: DERMATOLOGY CLINIC | Facility: CLINIC | Age: 78
End: 2018-06-22

## 2018-06-22 DIAGNOSIS — L82.1 SEBORRHEIC KERATOSES: Primary | ICD-10-CM

## 2018-06-22 DIAGNOSIS — D23.30 BENIGN NEOPLASM OF SKIN OF FACE: ICD-10-CM

## 2018-06-22 DIAGNOSIS — D23.60 BENIGN NEOPLASM OF SKIN OF UPPER LIMB, INCLUDING SHOULDER, UNSPECIFIED LATERALITY: ICD-10-CM

## 2018-06-22 DIAGNOSIS — D23.5 BENIGN NEOPLASM OF SKIN OF TRUNK, EXCEPT SCROTUM: ICD-10-CM

## 2018-06-22 DIAGNOSIS — L56.5 DSAP (DISSEMINATED SUPERFICIAL ACTINIC POROKERATOSIS): ICD-10-CM

## 2018-06-22 DIAGNOSIS — D23.4 BENIGN NEOPLASM OF SCALP AND SKIN OF NECK: ICD-10-CM

## 2018-06-22 PROCEDURE — 99202 OFFICE O/P NEW SF 15 MIN: CPT | Performed by: DERMATOLOGY

## 2018-06-22 PROCEDURE — 99212 OFFICE O/P EST SF 10 MIN: CPT | Performed by: DERMATOLOGY

## 2018-06-25 ENCOUNTER — OFFICE VISIT (OUTPATIENT)
Dept: PODIATRY CLINIC | Facility: CLINIC | Age: 78
End: 2018-06-25

## 2018-06-25 DIAGNOSIS — E11.9 CONTROLLED TYPE 2 DIABETES MELLITUS WITHOUT COMPLICATION, WITHOUT LONG-TERM CURRENT USE OF INSULIN (HCC): Primary | ICD-10-CM

## 2018-06-25 PROCEDURE — 99203 OFFICE O/P NEW LOW 30 MIN: CPT | Performed by: PODIATRIST

## 2018-06-25 PROCEDURE — 99212 OFFICE O/P EST SF 10 MIN: CPT | Performed by: PODIATRIST

## 2018-06-25 NOTE — PROGRESS NOTES
HPI:    Patient ID: Ivy Cespedes is a 68year old male. HPI  59-year-old male presents as a new patient to me on referral from [address] Vision states that he is here for a diabetic foot evaluation but is concerned about swelling.   He is accompanie mouth 2 (two) times daily. Disp: 180 tablet Rfl: 0     Allergies:No Known Allergies   PHYSICAL EXAM:   Physical Exam  On physical exam pedal pulses are noted. There is moderate edema in both feet and lower legs there is no erythema.   Dryness is noted hair

## 2018-07-02 NOTE — PROGRESS NOTES
Danny Yuen is a 68year old male. HPI:     CC:  Patient presents with:  Full Skin Exam: LOV in 2013 with Darrin Lee recommended derm visit. Concerned with hyperpigmented lesions on upper and lower extremities.  Denies family and personal hx of skin tamsulosin HCl 0.4 MG Oral Cap TAKE 1 CAPSULE (0.4 MG TOTAL) BY MOUTH DAILY. TAKE 1/2 HOUR FOLLOWING THE SAME MEAL EACH DAY Disp:  Rfl: 3   Tadalafil (CIALIS) 20 MG Oral Tab Please take 1-2 hours before planned sexual activity;  do not take with alcohol. reviewed as noted. ROS:  Denies any other systemic complaints. No new or changeing lesions other than noted above. No fevers, chills, night sweats, unusual sun sensitivity. No other skin complaints.         History, medications, allergies reviewed as still working at does not spend very much time outdoors except in off time. Continue careful sun protection  Please refer to map for specific lesions. See additional diagnoses. Pros cons of various therapies, risks benefits discussed. Pathophysiology dis

## 2018-07-03 DIAGNOSIS — R97.20 ELEVATED PSA: ICD-10-CM

## 2018-07-03 DIAGNOSIS — N40.1 BENIGN NON-NODULAR PROSTATIC HYPERPLASIA WITH LOWER URINARY TRACT SYMPTOMS: ICD-10-CM

## 2018-07-03 DIAGNOSIS — R35.1 NOCTURIA: ICD-10-CM

## 2018-07-03 DIAGNOSIS — R80.9 PROTEINURIA: ICD-10-CM

## 2018-07-03 DIAGNOSIS — N52.01 ERECTILE DYSFUNCTION DUE TO ARTERIAL INSUFFICIENCY: ICD-10-CM

## 2018-07-03 RX ORDER — TAMSULOSIN HYDROCHLORIDE 0.4 MG/1
0.4 CAPSULE ORAL DAILY
Qty: 90 CAPSULE | Refills: 0 | Status: SHIPPED | OUTPATIENT
Start: 2018-07-03 | End: 2018-10-01

## 2018-07-03 RX ORDER — FINASTERIDE 5 MG/1
5 TABLET, FILM COATED ORAL
Qty: 90 TABLET | Refills: 0 | Status: SHIPPED | OUTPATIENT
Start: 2018-07-03 | End: 2018-10-01

## 2018-07-19 ENCOUNTER — TELEPHONE (OUTPATIENT)
Dept: SURGERY | Facility: CLINIC | Age: 78
End: 2018-07-19

## 2018-07-19 NOTE — TELEPHONE ENCOUNTER
Pt is scheduled to see Dr. Ester Valadez on 9/5/18 at 8:00am Dr. Ester Valadez will not be in the office at that time.  I called pt and LM on the mobile home that we need to cx this appointment and if pt can come on Thursday 9/6/18 at 9:40am. Pt was instructed to c

## 2018-07-20 NOTE — TELEPHONE ENCOUNTER
I called pt and LM on the mobile about pt needing to reschedule his cyndie 9/5/18 to 9/6/18 pt is to call the office back to confirm that he received this message. Because I am not able to make contact with pt I am sending pt a letter.     ROUTED TO [place] so we c

## 2018-07-20 NOTE — TELEPHONE ENCOUNTER
Called and spoke with patients son, Irean Collet. Verified that patents son is on MAURICE. Informed son that we have attempted to reach his father Tabatha Burrell to reschedule appointment.  Patients son stated that he will inform Reba López to call us at his earliest c

## 2018-08-18 DIAGNOSIS — E78.2 MIXED HYPERLIPIDEMIA: ICD-10-CM

## 2018-08-18 RX ORDER — ATORVASTATIN CALCIUM 80 MG/1
TABLET, FILM COATED ORAL
Qty: 90 TABLET | Refills: 0 | Status: SHIPPED | OUTPATIENT
Start: 2018-08-18 | End: 2018-12-22

## 2018-08-18 NOTE — TELEPHONE ENCOUNTER
Refill passed per [facility], [facility] protocol.     Cholesterol Medications  Protocol Criteria:  · Appointment scheduled in the past 12 months or in the next 3 months  · ALT & LDL on file in the past 12 months  · ALT result < 80  · LDL result <130   Recent Outp

## 2018-09-06 ENCOUNTER — TELEPHONE (OUTPATIENT)
Dept: FAMILY MEDICINE CLINIC | Facility: CLINIC | Age: 78
End: 2018-09-06

## 2018-09-06 ENCOUNTER — OFFICE VISIT (OUTPATIENT)
Dept: SURGERY | Facility: CLINIC | Age: 78
End: 2018-09-06

## 2018-09-06 VITALS
WEIGHT: 168 LBS | BODY MASS INDEX: 25.46 KG/M2 | HEART RATE: 54 BPM | DIASTOLIC BLOOD PRESSURE: 68 MMHG | SYSTOLIC BLOOD PRESSURE: 125 MMHG | RESPIRATION RATE: 16 BRPM | HEIGHT: 68 IN | TEMPERATURE: 98 F

## 2018-09-06 DIAGNOSIS — N40.1 BENIGN NON-NODULAR PROSTATIC HYPERPLASIA WITH LOWER URINARY TRACT SYMPTOMS: Primary | ICD-10-CM

## 2018-09-06 DIAGNOSIS — N52.01 ERECTILE DYSFUNCTION DUE TO ARTERIAL INSUFFICIENCY: ICD-10-CM

## 2018-09-06 DIAGNOSIS — N40.1 BENIGN NON-NODULAR PROSTATIC HYPERPLASIA WITH LOWER URINARY TRACT SYMPTOMS: ICD-10-CM

## 2018-09-06 DIAGNOSIS — R80.9 PROTEINURIA, UNSPECIFIED TYPE: Primary | ICD-10-CM

## 2018-09-06 DIAGNOSIS — R35.1 NOCTURIA: ICD-10-CM

## 2018-09-06 LAB
BACTERIA UR QL AUTO: NEGATIVE /HPF
BILIRUB UR QL: NEGATIVE
CLARITY UR: CLEAR
COLOR UR: YELLOW
GLUCOSE UR-MCNC: NEGATIVE MG/DL
HGB UR QL STRIP.AUTO: NEGATIVE
KETONES UR-MCNC: NEGATIVE MG/DL
LEUKOCYTE ESTERASE UR QL STRIP.AUTO: NEGATIVE
NITRITE UR QL STRIP.AUTO: NEGATIVE
PH UR: 5 [PH] (ref 5–8)
PROT UR-MCNC: 30 MG/DL
RBC #/AREA URNS AUTO: 1 /HPF
SP GR UR STRIP: 1.02 (ref 1–1.03)
UROBILINOGEN UR STRIP-ACNC: <2
VIT C UR-MCNC: NEGATIVE MG/DL
WBC #/AREA URNS AUTO: 2 /HPF

## 2018-09-06 PROCEDURE — 99214 OFFICE O/P EST MOD 30 MIN: CPT | Performed by: UROLOGY

## 2018-09-06 PROCEDURE — 99212 OFFICE O/P EST SF 10 MIN: CPT | Performed by: UROLOGY

## 2018-09-06 NOTE — TELEPHONE ENCOUNTER
Pt is being seen by Dr JoseM artin [facility] today and pt insurance requires a referral for this visit.

## 2018-09-06 NOTE — PATIENT INSTRUCTIONS
1.  Continue tamsulosin 0.4 mg daily     2. Continue finasteride 5 mg daily     3. Urinalysis urine test today--to check and make sure there is no microscopic blood in the urine and also to check on severity of any protein in your urine:  We will let y

## 2018-09-06 NOTE — PROGRESS NOTES
HPI:    Patient ID: Sierra Morgan is a 68year old male. HPI     1.  Voiding Dysfunction  Patient has current AUA score of 1, mild voiding dysfunction category, same compared to previous score of 1, mild voiding dysfunction category, on 09/01/2017 per 6 2008th, and then February 2, 2009 at [facility]---no cancer found on either side.  Biopsies performed at [facility].  Finasteride started Kathryn 3, 2011 for very large BPH.    June 27, 2012 PSA 3.8, corrected for finasteride 7. 6.      September 2010 PSA 10.2.   Trudy tablet Rfl: 3   AmLODIPine Besylate 5 MG Oral Tab TAKE 1 TABLET (5 MG TOTAL) BY MOUTH ONCE DAILY. Disp: 90 tablet Rfl: 3   hydrochlorothiazide 25 MG Oral Tab TAKE 1 TABLET (25 MG TOTAL) BY MOUTH DAILY.  Disp: 90 tablet Rfl: 3   MetFORMIN HCl 500 MG Oral Tab well-developed and well-nourished. No distress. HENT:   Head: Normocephalic and atraumatic. Eyes: EOM are normal.   Neck: Normal range of motion. Pulmonary/Chest: Effort normal. No respiratory distress. Genitourinary:   Genitourinary Comments:  On D indicates his understanding and decides to continue medication as prescribed.  I explained to the pt that the heart healthy diet is the prostate healthy diet -- please see summary of discussion that took place below.     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 Eating fish is  better than poultry,  which is better than red meat. Beans are a very healthy option. Whole grain is better than processed grain. A significant part of your diet should be  vegetables, nuts, and fruits.   Olive oil is b

## 2018-10-01 DIAGNOSIS — N40.1 BENIGN NON-NODULAR PROSTATIC HYPERPLASIA WITH LOWER URINARY TRACT SYMPTOMS: ICD-10-CM

## 2018-10-01 DIAGNOSIS — R97.20 ELEVATED PSA: ICD-10-CM

## 2018-10-01 DIAGNOSIS — R35.1 NOCTURIA: ICD-10-CM

## 2018-10-01 DIAGNOSIS — N52.01 ERECTILE DYSFUNCTION DUE TO ARTERIAL INSUFFICIENCY: ICD-10-CM

## 2018-10-01 DIAGNOSIS — R80.9 PROTEINURIA: ICD-10-CM

## 2018-10-01 RX ORDER — FINASTERIDE 5 MG/1
5 TABLET, FILM COATED ORAL
Qty: 90 TABLET | Refills: 3 | Status: SHIPPED | OUTPATIENT
Start: 2018-10-01 | End: 2019-07-29

## 2018-10-01 RX ORDER — TAMSULOSIN HYDROCHLORIDE 0.4 MG/1
0.4 CAPSULE ORAL DAILY
Qty: 90 CAPSULE | Refills: 3 | Status: SHIPPED | OUTPATIENT
Start: 2018-10-01 | End: 2018-10-31

## 2018-10-08 ENCOUNTER — IMMUNIZATION (OUTPATIENT)
Dept: FAMILY MEDICINE CLINIC | Facility: CLINIC | Age: 78
End: 2018-10-08

## 2018-10-08 PROCEDURE — 90471 IMMUNIZATION ADMIN: CPT | Performed by: FAMILY MEDICINE

## 2018-10-08 PROCEDURE — 90653 IIV ADJUVANT VACCINE IM: CPT | Performed by: FAMILY MEDICINE

## 2018-12-04 DIAGNOSIS — I10 ESSENTIAL HYPERTENSION, BENIGN: ICD-10-CM

## 2018-12-04 RX ORDER — CLONIDINE 0.1 MG/24H
PATCH, EXTENDED RELEASE TRANSDERMAL
Qty: 12 PATCH | Refills: 0 | Status: SHIPPED | OUTPATIENT
Start: 2018-12-04 | End: 2019-02-26

## 2018-12-22 ENCOUNTER — OFFICE VISIT (OUTPATIENT)
Dept: FAMILY MEDICINE CLINIC | Facility: CLINIC | Age: 78
End: 2018-12-22

## 2018-12-22 ENCOUNTER — APPOINTMENT (OUTPATIENT)
Dept: LAB | Age: 78
End: 2018-12-22
Attending: FAMILY MEDICINE
Payer: COMMERCIAL

## 2018-12-22 VITALS
BODY MASS INDEX: 25.01 KG/M2 | WEIGHT: 165 LBS | HEART RATE: 56 BPM | HEIGHT: 68 IN | TEMPERATURE: 97 F | DIASTOLIC BLOOD PRESSURE: 60 MMHG | SYSTOLIC BLOOD PRESSURE: 130 MMHG

## 2018-12-22 DIAGNOSIS — R80.9 CONTROLLED TYPE 2 DIABETES MELLITUS WITH MICROALBUMINURIA, WITHOUT LONG-TERM CURRENT USE OF INSULIN (HCC): Primary | ICD-10-CM

## 2018-12-22 DIAGNOSIS — E78.2 MIXED HYPERLIPIDEMIA: ICD-10-CM

## 2018-12-22 DIAGNOSIS — N40.1 BENIGN NON-NODULAR PROSTATIC HYPERPLASIA WITH LOWER URINARY TRACT SYMPTOMS: ICD-10-CM

## 2018-12-22 DIAGNOSIS — E11.29 CONTROLLED TYPE 2 DIABETES MELLITUS WITH MICROALBUMINURIA, WITHOUT LONG-TERM CURRENT USE OF INSULIN (HCC): ICD-10-CM

## 2018-12-22 DIAGNOSIS — R80.9 CONTROLLED TYPE 2 DIABETES MELLITUS WITH MICROALBUMINURIA, WITHOUT LONG-TERM CURRENT USE OF INSULIN (HCC): ICD-10-CM

## 2018-12-22 DIAGNOSIS — I25.10 CORONARY ARTERY DISEASE INVOLVING NATIVE CORONARY ARTERY OF NATIVE HEART WITHOUT ANGINA PECTORIS: ICD-10-CM

## 2018-12-22 DIAGNOSIS — I10 ESSENTIAL HYPERTENSION, BENIGN: ICD-10-CM

## 2018-12-22 DIAGNOSIS — R60.0 BILATERAL LEG EDEMA: ICD-10-CM

## 2018-12-22 DIAGNOSIS — E11.29 CONTROLLED TYPE 2 DIABETES MELLITUS WITH MICROALBUMINURIA, WITHOUT LONG-TERM CURRENT USE OF INSULIN (HCC): Primary | ICD-10-CM

## 2018-12-22 PROCEDURE — 84443 ASSAY THYROID STIM HORMONE: CPT

## 2018-12-22 PROCEDURE — 99212 OFFICE O/P EST SF 10 MIN: CPT | Performed by: FAMILY MEDICINE

## 2018-12-22 PROCEDURE — 99214 OFFICE O/P EST MOD 30 MIN: CPT | Performed by: FAMILY MEDICINE

## 2018-12-22 PROCEDURE — 36415 COLL VENOUS BLD VENIPUNCTURE: CPT

## 2018-12-22 PROCEDURE — 83036 HEMOGLOBIN GLYCOSYLATED A1C: CPT

## 2018-12-22 PROCEDURE — 80048 BASIC METABOLIC PNL TOTAL CA: CPT

## 2018-12-22 RX ORDER — ATORVASTATIN CALCIUM 80 MG/1
80 TABLET, FILM COATED ORAL
Qty: 90 TABLET | Refills: 2 | Status: SHIPPED | OUTPATIENT
Start: 2018-12-22 | End: 2019-07-29

## 2018-12-22 NOTE — PROGRESS NOTES
Patient ID: Darci Smith is a 66year old male. HPI  Patient presents with:  Diabetes  Hypertension    Had a physical by me in May of this year. He is here for his follow-up for his diabetes.     Patient denies any chest pain, shortness breath, diap 05/26/2018    BUNCREA 15.0 05/26/2018    ANIONGAP 7 05/26/2018    GFRAA >60 05/26/2018    GFRNAA >60 05/26/2018    CA 9.6 05/26/2018     05/26/2018    K 3.9 05/26/2018     05/26/2018    CO2 32 05/26/2018    OSMOCALC 292 05/26/2018       Lab Res Wt Readings from Last 6 Encounters:  12/22/18 : 165 lb (74.8 kg)  09/06/18 : 168 lb (76.2 kg)  05/26/18 : 168 lb 12.8 oz (76.6 kg)  10/03/17 : 172 lb (78 kg)  10/02/17 : 165 lb (74.8 kg)  08/12/17 : 167 lb 12.8 oz (76.1 kg)      BMI Readings from take with alcohol. Disp: 10 tablet Rfl: 11   aspirin 81 MG Oral Tab EC Take 81 mg by mouth. At Bedtime Disp:  Rfl: 10   ticagrelor (BRILINTA) 90 MG Oral Tab Take 1 tablet by mouth 2 (two) times daily.  Disp: 180 tablet Rfl: 0     Allergies:No Known Allergie METABOLIC PANEL (8); Future  He would do labs today. He is fasting. Bilateral leg edema  Continue medications.   Leg edema is minimal   Benign non-nodular prostatic hyperplasia with lower urinary tract symptoms  Continue to see Dr. Brennon sánchez

## 2018-12-26 DIAGNOSIS — I25.10 CORONARY ARTERY DISEASE INVOLVING NATIVE CORONARY ARTERY OF NATIVE HEART WITHOUT ANGINA PECTORIS: ICD-10-CM

## 2019-02-23 DIAGNOSIS — I25.10 CORONARY ARTERY DISEASE INVOLVING NATIVE CORONARY ARTERY OF NATIVE HEART WITHOUT ANGINA PECTORIS: ICD-10-CM

## 2019-02-25 DIAGNOSIS — I10 ESSENTIAL HYPERTENSION, BENIGN: ICD-10-CM

## 2019-02-26 RX ORDER — CLONIDINE 0.1 MG/24H
PATCH, EXTENDED RELEASE TRANSDERMAL
Qty: 12 PATCH | Refills: 0 | Status: SHIPPED | OUTPATIENT
Start: 2019-02-26 | End: 2019-05-17

## 2019-02-28 VITALS
DIASTOLIC BLOOD PRESSURE: 60 MMHG | BODY MASS INDEX: 25.01 KG/M2 | OXYGEN SATURATION: 96 % | HEIGHT: 68 IN | HEART RATE: 74 BPM | SYSTOLIC BLOOD PRESSURE: 132 MMHG | WEIGHT: 165 LBS

## 2019-03-01 VITALS
SYSTOLIC BLOOD PRESSURE: 136 MMHG | HEART RATE: 57 BPM | BODY MASS INDEX: 47.74 KG/M2 | OXYGEN SATURATION: 95 % | HEIGHT: 68 IN | WEIGHT: 315 LBS | DIASTOLIC BLOOD PRESSURE: 70 MMHG

## 2019-03-01 VITALS
OXYGEN SATURATION: 97 % | HEIGHT: 68 IN | HEART RATE: 69 BPM | BODY MASS INDEX: 25.46 KG/M2 | SYSTOLIC BLOOD PRESSURE: 150 MMHG | WEIGHT: 168 LBS | DIASTOLIC BLOOD PRESSURE: 60 MMHG

## 2019-03-13 RX ORDER — ATORVASTATIN CALCIUM 80 MG/1
80 TABLET, FILM COATED ORAL DAILY
COMMUNITY
Start: 2017-04-18

## 2019-03-13 RX ORDER — METFORMIN HYDROCHLORIDE 500 MG/1
500 TABLET, EXTENDED RELEASE ORAL
COMMUNITY
End: 2020-02-10

## 2019-03-13 RX ORDER — HYDROCHLOROTHIAZIDE 25 MG/1
TABLET ORAL
COMMUNITY
End: 2019-03-28 | Stop reason: ALTCHOICE

## 2019-03-13 RX ORDER — FINASTERIDE 5 MG/1
5 TABLET, FILM COATED ORAL DAILY
COMMUNITY

## 2019-03-13 RX ORDER — AMLODIPINE BESYLATE 5 MG/1
5 TABLET ORAL DAILY
COMMUNITY

## 2019-03-13 RX ORDER — CLONIDINE 0.1 MG/24H
PATCH, EXTENDED RELEASE TRANSDERMAL
COMMUNITY
End: 2020-02-10

## 2019-03-13 RX ORDER — VALSARTAN 320 MG/1
320 TABLET ORAL DAILY
COMMUNITY
End: 2020-02-10

## 2019-03-13 RX ORDER — TAMSULOSIN HYDROCHLORIDE 0.4 MG/1
0.4 CAPSULE ORAL DAILY
COMMUNITY

## 2019-03-28 ENCOUNTER — OFFICE VISIT (OUTPATIENT)
Dept: CARDIOLOGY | Age: 79
End: 2019-03-28

## 2019-03-28 DIAGNOSIS — R60.9 SWELLING: Primary | ICD-10-CM

## 2019-03-28 DIAGNOSIS — Z95.5 S/P DRUG ELUTING CORONARY STENT PLACEMENT: ICD-10-CM

## 2019-03-28 DIAGNOSIS — I10 ESSENTIAL HYPERTENSION: ICD-10-CM

## 2019-03-28 DIAGNOSIS — E78.00 PURE HYPERCHOLESTEROLEMIA: ICD-10-CM

## 2019-03-28 DIAGNOSIS — I25.10 CORONARY ARTERY DISEASE INVOLVING NATIVE HEART WITHOUT ANGINA PECTORIS, UNSPECIFIED VESSEL OR LESION TYPE: ICD-10-CM

## 2019-03-28 PROBLEM — E78.5 HYPERLIPIDEMIA: Status: ACTIVE | Noted: 2019-03-28

## 2019-03-28 PROCEDURE — 99204 OFFICE O/P NEW MOD 45 MIN: CPT | Performed by: INTERNAL MEDICINE

## 2019-03-28 PROCEDURE — 3074F SYST BP LT 130 MM HG: CPT | Performed by: INTERNAL MEDICINE

## 2019-03-28 PROCEDURE — 3078F DIAST BP <80 MM HG: CPT | Performed by: INTERNAL MEDICINE

## 2019-03-28 RX ORDER — HYDROCHLOROTHIAZIDE 25 MG/1
25 TABLET ORAL
COMMUNITY
Start: 2017-04-18 | End: 2019-03-28 | Stop reason: SDUPTHER

## 2019-03-28 RX ORDER — FUROSEMIDE 40 MG/1
40 TABLET ORAL DAILY
Qty: 30 TABLET | Refills: 3 | Status: SHIPPED | OUTPATIENT
Start: 2019-03-28 | End: 2019-07-10 | Stop reason: SDUPTHER

## 2019-03-28 RX ORDER — TADALAFIL 20 MG/1
20 TABLET ORAL DAILY
COMMUNITY
Start: 2016-08-31 | End: 2019-04-16

## 2019-03-28 RX ORDER — CLONIDINE 0.1 MG/24H
PATCH, EXTENDED RELEASE TRANSDERMAL
COMMUNITY
Start: 2017-03-08 | End: 2019-03-28 | Stop reason: SDUPTHER

## 2019-03-28 ASSESSMENT — PAIN SCALES - GENERAL: PAINLEVEL: 0

## 2019-04-01 ENCOUNTER — TELEPHONE (OUTPATIENT)
Dept: CARDIOLOGY | Age: 79
End: 2019-04-01

## 2019-04-05 ENCOUNTER — TELEPHONE (OUTPATIENT)
Dept: CARDIOLOGY | Age: 79
End: 2019-04-05

## 2019-04-05 ENCOUNTER — TELEPHONE (OUTPATIENT)
Dept: FAMILY MEDICINE CLINIC | Facility: CLINIC | Age: 79
End: 2019-04-05

## 2019-04-05 ENCOUNTER — HOSPITAL ENCOUNTER (OUTPATIENT)
Dept: CARDIOLOGY CLINIC | Age: 79
Discharge: HOME | End: 2019-04-05
Attending: INTERNAL MEDICINE
Payer: COMMERCIAL

## 2019-04-05 DIAGNOSIS — N40.1 BENIGN NON-NODULAR PROSTATIC HYPERPLASIA WITH LOWER URINARY TRACT SYMPTOMS: Primary | ICD-10-CM

## 2019-04-05 DIAGNOSIS — R60.9 EDEMA, UNSPECIFIED TYPE: ICD-10-CM

## 2019-04-05 DIAGNOSIS — I25.10 CORONARY ARTERY DISEASE INVOLVING NATIVE CORONARY ARTERY OF NATIVE HEART WITHOUT ANGINA PECTORIS: ICD-10-CM

## 2019-04-05 PROCEDURE — 93306 TTE W/DOPPLER COMPLETE: CPT | Performed by: INTERNAL MEDICINE

## 2019-04-05 NOTE — TELEPHONE ENCOUNTER
Need referral from Primary Doctor  Please call when ready     Appt schedule for 9/11/19 Jasmina Chicas [facility]

## 2019-04-08 ENCOUNTER — TELEPHONE (OUTPATIENT)
Dept: CARDIOLOGY | Age: 79
End: 2019-04-08

## 2019-04-10 DIAGNOSIS — I25.10 CORONARY ARTERY DISEASE INVOLVING NATIVE CORONARY ARTERY OF NATIVE HEART WITHOUT ANGINA PECTORIS: ICD-10-CM

## 2019-04-15 DIAGNOSIS — I25.10 CORONARY ARTERY DISEASE INVOLVING NATIVE CORONARY ARTERY OF NATIVE HEART WITHOUT ANGINA PECTORIS: ICD-10-CM

## 2019-04-16 ENCOUNTER — LAB ENCOUNTER (OUTPATIENT)
Dept: LAB | Facility: HOSPITAL | Age: 79
End: 2019-04-16
Attending: NURSE PRACTITIONER
Payer: COMMERCIAL

## 2019-04-16 ENCOUNTER — OFFICE VISIT (OUTPATIENT)
Dept: CARDIOLOGY | Age: 79
End: 2019-04-16

## 2019-04-16 VITALS
HEIGHT: 68 IN | DIASTOLIC BLOOD PRESSURE: 60 MMHG | WEIGHT: 166 LBS | OXYGEN SATURATION: 98 % | HEART RATE: 58 BPM | SYSTOLIC BLOOD PRESSURE: 124 MMHG | BODY MASS INDEX: 25.16 KG/M2

## 2019-04-16 DIAGNOSIS — I25.10 CORONARY ARTERY DISEASE INVOLVING NATIVE HEART WITHOUT ANGINA PECTORIS, UNSPECIFIED VESSEL OR LESION TYPE: ICD-10-CM

## 2019-04-16 DIAGNOSIS — I10 ESSENTIAL HYPERTENSION, MALIGNANT: Primary | ICD-10-CM

## 2019-04-16 DIAGNOSIS — I10 ESSENTIAL HYPERTENSION: Primary | ICD-10-CM

## 2019-04-16 DIAGNOSIS — R60.9 SWELLING: ICD-10-CM

## 2019-04-16 LAB
ANION GAP SERPL CALC-SCNC: NORMAL MMOL/L
BUN SERPL-MCNC: 19 MG/DL
BUN/CREAT SERPL: NORMAL
CALCIUM SERPL-MCNC: 9.3 MG/DL
CHLORIDE SERPL-SCNC: 105 MMOL/L
CO2 SERPL-SCNC: NORMAL MMOL/L
CREAT SERPL-MCNC: 1.01 MG/DL
GLUCOSE SERPL-MCNC: 133 MG/DL
LENGTH OF FAST TIME PATIENT: NORMAL H
POTASSIUM SERPL-SCNC: 4 MMOL/L
SODIUM SERPL-SCNC: 142 MMOL/L

## 2019-04-16 PROCEDURE — 99213 OFFICE O/P EST LOW 20 MIN: CPT | Performed by: NURSE PRACTITIONER

## 2019-04-16 PROCEDURE — 3074F SYST BP LT 130 MM HG: CPT | Performed by: NURSE PRACTITIONER

## 2019-04-16 PROCEDURE — 3078F DIAST BP <80 MM HG: CPT | Performed by: NURSE PRACTITIONER

## 2019-04-16 PROCEDURE — 80048 BASIC METABOLIC PNL TOTAL CA: CPT

## 2019-04-16 PROCEDURE — 36415 COLL VENOUS BLD VENIPUNCTURE: CPT

## 2019-04-16 SDOH — HEALTH STABILITY: MENTAL HEALTH: HOW OFTEN DO YOU HAVE A DRINK CONTAINING ALCOHOL?: NEVER

## 2019-04-16 SDOH — HEALTH STABILITY: PHYSICAL HEALTH: ON AVERAGE, HOW MANY DAYS PER WEEK DO YOU ENGAGE IN MODERATE TO STRENUOUS EXERCISE (LIKE A BRISK WALK)?: 5 DAYS

## 2019-04-16 SDOH — HEALTH STABILITY: MENTAL HEALTH
STRESS IS WHEN SOMEONE FEELS TENSE, NERVOUS, ANXIOUS, OR CAN'T SLEEP AT NIGHT BECAUSE THEIR MIND IS TROUBLED. HOW STRESSED ARE YOU?: NOT AT ALL

## 2019-04-16 SDOH — HEALTH STABILITY: PHYSICAL HEALTH: ON AVERAGE, HOW MANY MINUTES DO YOU ENGAGE IN EXERCISE AT THIS LEVEL?: 30 MIN

## 2019-04-17 ENCOUNTER — CLINICAL ABSTRACT (OUTPATIENT)
Dept: CARDIOLOGY | Age: 79
End: 2019-04-17

## 2019-04-17 NOTE — TELEPHONE ENCOUNTER
Refill passed per [facility], [facility] protocol.     Hypertensive Medications  Protocol Criteria:  · Appointment scheduled in the past 6 months or in the next 3 months  · BMP or CMP in the past 12 months  · Creatinine result < 2  Recent Outpatient Visits

## 2019-05-17 DIAGNOSIS — I10 ESSENTIAL HYPERTENSION, BENIGN: ICD-10-CM

## 2019-05-17 RX ORDER — CLONIDINE 0.1 MG/24H
PATCH, EXTENDED RELEASE TRANSDERMAL
Qty: 12 PATCH | Refills: 1 | Status: SHIPPED | OUTPATIENT
Start: 2019-05-17 | End: 2019-10-28

## 2019-06-15 DIAGNOSIS — I10 ESSENTIAL HYPERTENSION, BENIGN: ICD-10-CM

## 2019-06-15 RX ORDER — VALSARTAN 320 MG/1
TABLET ORAL
Qty: 90 TABLET | Refills: 1 | Status: SHIPPED | OUTPATIENT
Start: 2019-06-15 | End: 2019-07-29

## 2019-06-15 NOTE — TELEPHONE ENCOUNTER
Refill passed per [address] [facility] protocol.       Requested Prescriptions   Pending Prescriptions Disp Refills   • valsartan 320 MG Oral Tab [Pharmacy Med Name: VALSARTAN 320 MG TABLET] 90 tablet 3     Sig: TAKE 1 TABLET BY MOUTH EVERY DAY       Hypertensiv

## 2019-07-08 DIAGNOSIS — E11.29 CONTROLLED TYPE 2 DIABETES MELLITUS WITH MICROALBUMINURIA, WITHOUT LONG-TERM CURRENT USE OF INSULIN: ICD-10-CM

## 2019-07-08 DIAGNOSIS — R80.9 CONTROLLED TYPE 2 DIABETES MELLITUS WITH MICROALBUMINURIA, WITHOUT LONG-TERM CURRENT USE OF INSULIN: ICD-10-CM

## 2019-07-09 ENCOUNTER — OFFICE VISIT (OUTPATIENT)
Dept: FAMILY MEDICINE CLINIC | Facility: CLINIC | Age: 79
End: 2019-07-09

## 2019-07-09 ENCOUNTER — LAB ENCOUNTER (OUTPATIENT)
Dept: LAB | Age: 79
End: 2019-07-09
Attending: FAMILY MEDICINE
Payer: COMMERCIAL

## 2019-07-09 VITALS
HEIGHT: 68 IN | HEART RATE: 56 BPM | SYSTOLIC BLOOD PRESSURE: 152 MMHG | WEIGHT: 166.19 LBS | TEMPERATURE: 97 F | RESPIRATION RATE: 14 BRPM | BODY MASS INDEX: 25.19 KG/M2 | DIASTOLIC BLOOD PRESSURE: 70 MMHG

## 2019-07-09 DIAGNOSIS — M25.472 ANKLE EDEMA, BILATERAL: ICD-10-CM

## 2019-07-09 DIAGNOSIS — E11.29 CONTROLLED TYPE 2 DIABETES MELLITUS WITH MICROALBUMINURIA, WITHOUT LONG-TERM CURRENT USE OF INSULIN (HCC): ICD-10-CM

## 2019-07-09 DIAGNOSIS — L57.0 AK (ACTINIC KERATOSIS): ICD-10-CM

## 2019-07-09 DIAGNOSIS — M25.471 ANKLE EDEMA, BILATERAL: ICD-10-CM

## 2019-07-09 DIAGNOSIS — I25.10 CORONARY ARTERY DISEASE INVOLVING NATIVE CORONARY ARTERY OF NATIVE HEART WITHOUT ANGINA PECTORIS: ICD-10-CM

## 2019-07-09 DIAGNOSIS — R80.9 CONTROLLED TYPE 2 DIABETES MELLITUS WITH MICROALBUMINURIA, WITHOUT LONG-TERM CURRENT USE OF INSULIN (HCC): ICD-10-CM

## 2019-07-09 DIAGNOSIS — Z23 NEED FOR VACCINATION: ICD-10-CM

## 2019-07-09 DIAGNOSIS — I10 ESSENTIAL HYPERTENSION, BENIGN: ICD-10-CM

## 2019-07-09 DIAGNOSIS — N40.1 BENIGN NON-NODULAR PROSTATIC HYPERPLASIA WITH LOWER URINARY TRACT SYMPTOMS: ICD-10-CM

## 2019-07-09 DIAGNOSIS — L57.8 SUN-DAMAGED SKIN: ICD-10-CM

## 2019-07-09 DIAGNOSIS — Z00.00 ADULT GENERAL MEDICAL EXAM: Primary | ICD-10-CM

## 2019-07-09 DIAGNOSIS — Z00.00 ADULT GENERAL MEDICAL EXAM: ICD-10-CM

## 2019-07-09 LAB
ALBUMIN SERPL-MCNC: 3.9 G/DL (ref 3.4–5)
ALBUMIN/GLOB SERPL: 0.9 {RATIO} (ref 1–2)
ALP LIVER SERPL-CCNC: 98 U/L (ref 45–117)
ALT SERPL-CCNC: 21 U/L (ref 16–61)
ANION GAP SERPL CALC-SCNC: 6 MMOL/L (ref 0–18)
AST SERPL-CCNC: 14 U/L (ref 15–37)
BASOPHILS # BLD AUTO: 0.04 X10(3) UL (ref 0–0.2)
BASOPHILS NFR BLD AUTO: 0.4 %
BILIRUB SERPL-MCNC: 0.8 MG/DL (ref 0.1–2)
BUN BLD-MCNC: 17 MG/DL (ref 7–18)
BUN/CREAT SERPL: 17.2 (ref 10–20)
CALCIUM BLD-MCNC: 9.2 MG/DL (ref 8.5–10.1)
CHLORIDE SERPL-SCNC: 104 MMOL/L (ref 98–112)
CHOLEST SERPL-MCNC: 79 MG/DL
CHOLEST SMN-MCNC: 79 MG/DL (ref ?–200)
CHOLEST/HDLC SERPL: NORMAL {RATIO}
CO2 SERPL-SCNC: 33 MMOL/L (ref 21–32)
COMPLEXED PSA SERPL-MCNC: 2.71 NG/ML (ref ?–4)
CREAT BLD-MCNC: 0.99 MG/DL (ref 0.7–1.3)
CREAT UR-SCNC: 21.7 MG/DL
DEPRECATED RDW RBC AUTO: 47 FL (ref 35.1–46.3)
EOSINOPHIL # BLD AUTO: 0.31 X10(3) UL (ref 0–0.7)
EOSINOPHIL NFR BLD AUTO: 3.3 %
ERYTHROCYTE [DISTWIDTH] IN BLOOD BY AUTOMATED COUNT: 15.2 % (ref 11–15)
EST. AVERAGE GLUCOSE BLD GHB EST-MCNC: 134 MG/DL (ref 68–126)
GLOBULIN PLAS-MCNC: 4.3 G/DL (ref 2.8–4.4)
GLUCOSE BLD-MCNC: 92 MG/DL (ref 70–99)
HBA1C MFR BLD HPLC: 6.3 % (ref ?–5.7)
HCT VFR BLD AUTO: 41.4 % (ref 39–53)
HDLC SERPL-MCNC: 48 MG/DL
HDLC SERPL-MCNC: 48 MG/DL (ref 40–59)
HGB BLD-MCNC: 13.1 G/DL (ref 13–17.5)
IMM GRANULOCYTES # BLD AUTO: 0.02 X10(3) UL (ref 0–1)
IMM GRANULOCYTES NFR BLD: 0.2 %
LDLC SERPL CALC-MCNC: 22 MG/DL
LDLC SERPL CALC-MCNC: 22 MG/DL (ref ?–100)
LENGTH OF FAST TIME PATIENT: NORMAL H
LYMPHOCYTES # BLD AUTO: 1.7 X10(3) UL (ref 1–4)
LYMPHOCYTES NFR BLD AUTO: 18.2 %
M PROTEIN MFR SERPL ELPH: 8.2 G/DL (ref 6.4–8.2)
MCH RBC QN AUTO: 27 PG (ref 26–34)
MCHC RBC AUTO-ENTMCNC: 31.6 G/DL (ref 31–37)
MCV RBC AUTO: 85.4 FL (ref 80–100)
MICROALBUMIN UR-MCNC: 7.7 MG/DL
MICROALBUMIN/CREAT 24H UR-RTO: 354.8 UG/MG (ref ?–30)
MONOCYTES # BLD AUTO: 0.75 X10(3) UL (ref 0.1–1)
MONOCYTES NFR BLD AUTO: 8 %
NEUTROPHILS # BLD AUTO: 6.52 X10 (3) UL (ref 1.5–7.7)
NEUTROPHILS # BLD AUTO: 6.52 X10(3) UL (ref 1.5–7.7)
NEUTROPHILS NFR BLD AUTO: 69.9 %
NONHDLC SERPL-MCNC: 31 MG/DL
NONHDLC SERPL-MCNC: 31 MG/DL (ref ?–130)
OSMOLALITY SERPL CALC.SUM OF ELEC: 297 MOSM/KG (ref 275–295)
PATIENT FASTING: YES
PATIENT FASTING: YES
PLATELET # BLD AUTO: 262 10(3)UL (ref 150–450)
POTASSIUM SERPL-SCNC: 3.9 MMOL/L (ref 3.5–5.1)
RBC # BLD AUTO: 4.85 X10(6)UL (ref 3.8–5.8)
SODIUM SERPL-SCNC: 143 MMOL/L (ref 136–145)
TRIGL SERPL-MCNC: 43 MG/DL
TRIGL SERPL-MCNC: 43 MG/DL (ref 30–149)
TSI SER-ACNC: 1.02 MIU/ML (ref 0.36–3.74)
VLDLC SERPL CALC-MCNC: 9 MG/DL
VLDLC SERPL CALC-MCNC: 9 MG/DL (ref 0–30)
WBC # BLD AUTO: 9.3 X10(3) UL (ref 4–11)

## 2019-07-09 PROCEDURE — 99397 PER PM REEVAL EST PAT 65+ YR: CPT | Performed by: FAMILY MEDICINE

## 2019-07-09 PROCEDURE — 90471 IMMUNIZATION ADMIN: CPT | Performed by: FAMILY MEDICINE

## 2019-07-09 PROCEDURE — 82043 UR ALBUMIN QUANTITATIVE: CPT

## 2019-07-09 PROCEDURE — 85025 COMPLETE CBC W/AUTO DIFF WBC: CPT

## 2019-07-09 PROCEDURE — 36415 COLL VENOUS BLD VENIPUNCTURE: CPT

## 2019-07-09 PROCEDURE — 84443 ASSAY THYROID STIM HORMONE: CPT

## 2019-07-09 PROCEDURE — 82570 ASSAY OF URINE CREATININE: CPT

## 2019-07-09 PROCEDURE — 83036 HEMOGLOBIN GLYCOSYLATED A1C: CPT

## 2019-07-09 PROCEDURE — 80061 LIPID PANEL: CPT

## 2019-07-09 PROCEDURE — 90732 PPSV23 VACC 2 YRS+ SUBQ/IM: CPT | Performed by: FAMILY MEDICINE

## 2019-07-09 PROCEDURE — 80053 COMPREHEN METABOLIC PANEL: CPT

## 2019-07-09 NOTE — PROGRESS NOTES
Patient ID: Hay Small is a 66year old male. HPI  Patient presents with:  Diabetes  Hyperlipidemia  Hypertension    He is here for physical exam.  He is quite healthy. Patient exercises by walking at work.  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 Neurological: Negative for dizziness, speech difficulty and weakness. Psychiatric/Behavioral: The patient is not nervous/anxious.           Past Medical History:   Diagnosis Date   • Benign prostatic hypertrophy    • Colon polyps 2013   • [place] file        Physically abused: Not on file        Forced sexual activity: Not on file    Other Topics      Concerns:         Service: Not Asked        Blood Transfusions: Not Asked        Caffeine Concern: Yes        Occupational Exposure: Not Aske Allergies   PHYSICAL EXAM:   Physical Exam  Physical Exam   Constitutional: He appears well-developed and well-nourished. No distress. HENT:   Head: Normocephalic.    Right Ear: Tympanic membrane and ear canal normal.   Left Ear: Tympanic membrane and ear MICROALB/CREAT RATIO, RANDOM URINE; Future  Doing very well and keep his appointments with his specialist.  Glynn Peels been ordered today. Essential hypertension, benign  Blood pressure elevated but this the first time is been elevated in some time.   I want h AM.    I, Ailyn Carreon DO,  personally performed the services described in this documentation. All medical record entries made by the scribe were at my direction and in my presence.   I have reviewed the chart and discharge instructions (if applicable) an

## 2019-07-09 NOTE — PATIENT INSTRUCTIONS
Decrease salt intake or avoid taking any extra salt. Keep yourself active. Take all medications as directed. See me then in 4 to 5 weeks and will see if your blood pressure continues to be high.

## 2019-07-10 RX ORDER — FUROSEMIDE 40 MG/1
TABLET ORAL
Qty: 90 TABLET | Refills: 1 | Status: SHIPPED | OUTPATIENT
Start: 2019-07-10 | End: 2020-01-10 | Stop reason: SDUPTHER

## 2019-07-17 DIAGNOSIS — I25.10 CORONARY ARTERY DISEASE INVOLVING NATIVE CORONARY ARTERY OF NATIVE HEART WITHOUT ANGINA PECTORIS: ICD-10-CM

## 2019-07-18 ENCOUNTER — OFFICE VISIT (OUTPATIENT)
Dept: CARDIOLOGY | Age: 79
End: 2019-07-18

## 2019-07-18 VITALS
SYSTOLIC BLOOD PRESSURE: 120 MMHG | HEART RATE: 65 BPM | OXYGEN SATURATION: 97 % | WEIGHT: 167 LBS | HEIGHT: 68 IN | BODY MASS INDEX: 25.31 KG/M2 | DIASTOLIC BLOOD PRESSURE: 80 MMHG

## 2019-07-18 DIAGNOSIS — I25.10 CORONARY ARTERY DISEASE INVOLVING NATIVE HEART WITHOUT ANGINA PECTORIS, UNSPECIFIED VESSEL OR LESION TYPE: Primary | ICD-10-CM

## 2019-07-18 DIAGNOSIS — E78.00 PURE HYPERCHOLESTEROLEMIA: ICD-10-CM

## 2019-07-18 DIAGNOSIS — Z95.5 S/P DRUG ELUTING CORONARY STENT PLACEMENT: ICD-10-CM

## 2019-07-18 DIAGNOSIS — I10 ESSENTIAL HYPERTENSION: ICD-10-CM

## 2019-07-18 PROCEDURE — 99213 OFFICE O/P EST LOW 20 MIN: CPT | Performed by: INTERNAL MEDICINE

## 2019-07-18 PROCEDURE — 3074F SYST BP LT 130 MM HG: CPT | Performed by: INTERNAL MEDICINE

## 2019-07-18 PROCEDURE — 3079F DIAST BP 80-89 MM HG: CPT | Performed by: INTERNAL MEDICINE

## 2019-07-29 ENCOUNTER — HOSPITAL ENCOUNTER (OUTPATIENT)
Dept: GENERAL RADIOLOGY | Age: 79
Discharge: HOME | End: 2019-07-29
Attending: FAMILY MEDICINE
Payer: COMMERCIAL

## 2019-07-29 ENCOUNTER — OFFICE VISIT (OUTPATIENT)
Dept: FAMILY MEDICINE CLINIC | Facility: CLINIC | Age: 79
End: 2019-07-29

## 2019-07-29 VITALS
HEIGHT: 68 IN | BODY MASS INDEX: 25.67 KG/M2 | WEIGHT: 169.38 LBS | HEART RATE: 57 BPM | SYSTOLIC BLOOD PRESSURE: 159 MMHG | TEMPERATURE: 98 F | DIASTOLIC BLOOD PRESSURE: 65 MMHG

## 2019-07-29 DIAGNOSIS — N52.01 ERECTILE DYSFUNCTION DUE TO ARTERIAL INSUFFICIENCY: ICD-10-CM

## 2019-07-29 DIAGNOSIS — E78.2 MIXED HYPERLIPIDEMIA: ICD-10-CM

## 2019-07-29 DIAGNOSIS — M25.471 ANKLE EDEMA, BILATERAL: ICD-10-CM

## 2019-07-29 DIAGNOSIS — R09.89 BILATERAL RALES: ICD-10-CM

## 2019-07-29 DIAGNOSIS — R80.9 PROTEINURIA: ICD-10-CM

## 2019-07-29 DIAGNOSIS — I10 ESSENTIAL HYPERTENSION: ICD-10-CM

## 2019-07-29 DIAGNOSIS — I10 ESSENTIAL HYPERTENSION: Primary | ICD-10-CM

## 2019-07-29 DIAGNOSIS — M25.472 ANKLE EDEMA, BILATERAL: ICD-10-CM

## 2019-07-29 DIAGNOSIS — R35.1 NOCTURIA: ICD-10-CM

## 2019-07-29 DIAGNOSIS — I10 ESSENTIAL HYPERTENSION, BENIGN: ICD-10-CM

## 2019-07-29 DIAGNOSIS — N40.1 BENIGN NON-NODULAR PROSTATIC HYPERPLASIA WITH LOWER URINARY TRACT SYMPTOMS: ICD-10-CM

## 2019-07-29 DIAGNOSIS — R97.20 ELEVATED PSA: ICD-10-CM

## 2019-07-29 PROCEDURE — 71046 X-RAY EXAM CHEST 2 VIEWS: CPT | Performed by: FAMILY MEDICINE

## 2019-07-29 PROCEDURE — 99214 OFFICE O/P EST MOD 30 MIN: CPT | Performed by: FAMILY MEDICINE

## 2019-07-29 RX ORDER — ATORVASTATIN CALCIUM 80 MG/1
80 TABLET, FILM COATED ORAL
Qty: 90 TABLET | Refills: 2 | Status: SHIPPED | OUTPATIENT
Start: 2019-07-29 | End: 2020-06-27

## 2019-07-29 RX ORDER — FINASTERIDE 5 MG/1
5 TABLET, FILM COATED ORAL
Qty: 90 TABLET | Refills: 3 | Status: SHIPPED | OUTPATIENT
Start: 2019-07-29 | End: 2020-06-27

## 2019-07-29 RX ORDER — VALSARTAN AND HYDROCHLOROTHIAZIDE 320; 25 MG/1; MG/1
1 TABLET, FILM COATED ORAL DAILY
Qty: 90 TABLET | Refills: 1 | Status: SHIPPED | OUTPATIENT
Start: 2019-07-29 | End: 2020-01-24

## 2019-07-29 RX ORDER — AMLODIPINE BESYLATE 5 MG/1
TABLET ORAL
Qty: 90 TABLET | Refills: 3 | Status: ON HOLD | OUTPATIENT
Start: 2019-07-29 | End: 2020-02-06

## 2019-07-29 RX ORDER — SPIRONOLACTONE 25 MG/1
25 TABLET ORAL DAILY
Qty: 90 TABLET | Refills: 0 | Status: SHIPPED | OUTPATIENT
Start: 2019-07-29 | End: 2019-10-22

## 2019-07-29 NOTE — PROGRESS NOTES
Patient ID: Nelson Welch is a 66year old male. HPI  Patient presents with:  Hypertension: follow up    Patient denies any chest pain, shortness breath, or numbness or tingling in the legs.   We had him come back as his blood pressure was elevated la 2015            Current Outpatient Medications:  METOPROLOL TARTRATE 25 MG Oral Tab TAKE 1/2 TABLET BY MOUTH TWICE A DAY Disp: 90 tablet Rfl: 1   metFORMIN HCl 500 MG Oral Tab TAKE 1 TABLET BY MOUTH EVERY DAY WITH DINNER Disp: 90 tablet Rfl: 0   valsartan Skin: Skin is warm. Psychiatry: Normal mood and affect     Vitals reviewed.    07/29/19  0816 07/29/19  0825   BP: 150/75 159/65   Pulse: 57    Temp: 97.5 °F (36.4 °C)    TempSrc: Oral    Weight: 169 lb 6.4 oz (76.8 kg)    Height: 5' 8\" (1.727 m) member understands and agrees to plan. Return in about 1 month (around 8/29/2019). Patient Instructions   I will see you in 1 month.   When you are done with either the valsartan 320 mg or the hydrochlorothiazide 25 mg you should take the combination

## 2019-07-29 NOTE — PATIENT INSTRUCTIONS
I will see you in 1 month. When you are done with either the valsartan 320 mg or the hydrochlorothiazide 25 mg you should take the combination pill which I sent in that has both of these in it.   You would of course stop the separate valsartan and hydrochl

## 2019-08-12 ENCOUNTER — OFFICE VISIT (OUTPATIENT)
Dept: DERMATOLOGY CLINIC | Facility: CLINIC | Age: 79
End: 2019-08-12

## 2019-08-12 DIAGNOSIS — D23.30 BENIGN NEOPLASM OF SKIN OF FACE: ICD-10-CM

## 2019-08-12 DIAGNOSIS — L81.4 LENTIGO: ICD-10-CM

## 2019-08-12 DIAGNOSIS — L56.5 DSAP (DISSEMINATED SUPERFICIAL ACTINIC POROKERATOSIS): ICD-10-CM

## 2019-08-12 DIAGNOSIS — L82.1 SEBORRHEIC KERATOSES: Primary | ICD-10-CM

## 2019-08-12 DIAGNOSIS — D23.5 BENIGN NEOPLASM OF SKIN OF TRUNK, EXCEPT SCROTUM: ICD-10-CM

## 2019-08-12 DIAGNOSIS — D23.70 BENIGN NEOPLASM OF SKIN OF LOWER LIMB, INCLUDING HIP, UNSPECIFIED LATERALITY: ICD-10-CM

## 2019-08-12 DIAGNOSIS — D23.4 BENIGN NEOPLASM OF SCALP AND SKIN OF NECK: ICD-10-CM

## 2019-08-12 DIAGNOSIS — D23.60 BENIGN NEOPLASM OF SKIN OF UPPER LIMB, INCLUDING SHOULDER, UNSPECIFIED LATERALITY: ICD-10-CM

## 2019-08-12 PROCEDURE — 99213 OFFICE O/P EST LOW 20 MIN: CPT | Performed by: DERMATOLOGY

## 2019-08-25 NOTE — PROGRESS NOTES
Jamilah Boykin is a 66year old male. HPI:     CC:  Patient presents with:  Full Skin Exam: LOV 6/22/18. pt presenting with full body skin check. Denies family and personal HX of skin cancer. Allergies:  Patient has no known allergies.     HISTORY: 1   Tadalafil (CIALIS) 20 MG Oral Tab Please take 1-2 hours before planned sexual activity;  do not take with alcohol. Disp: 10 tablet Rfl: 11   aspirin 81 MG Oral Tab EC Take 81 mg by mouth.  At Bedtime Disp:  Rfl: 10   ticagrelor (BRILINTA) 90 MG Oral Tab file        Attends meetings of clubs or organizations: Not on file        Relationship status: Not on file      Intimate partner violence:        Fear of current or ex partner: Not on file        Emotionally abused: Not on file        Physically abused: N Well-developed well-nourished patient alert oriented in no acute distress.   Exam total-body performed, including scalp, head, neck, face,nails, hair, external eyes, including conjunctival mucosa, eyelids, lips external ears, back, chest,/ breasts, axilla not spend very much time outdoors except in off time.   Continue careful sun protection    Larger lentigines over the upper back continue careful monitoring as these do have slight variation in color consistent with seborrheic keratoses/lentigines on dermos

## 2019-08-26 ENCOUNTER — APPOINTMENT (OUTPATIENT)
Dept: LAB | Age: 79
End: 2019-08-26
Attending: FAMILY MEDICINE
Payer: COMMERCIAL

## 2019-08-26 ENCOUNTER — OFFICE VISIT (OUTPATIENT)
Dept: FAMILY MEDICINE CLINIC | Facility: CLINIC | Age: 79
End: 2019-08-26

## 2019-08-26 VITALS
HEART RATE: 62 BPM | TEMPERATURE: 97 F | HEIGHT: 68 IN | BODY MASS INDEX: 25.16 KG/M2 | DIASTOLIC BLOOD PRESSURE: 65 MMHG | WEIGHT: 166 LBS | SYSTOLIC BLOOD PRESSURE: 137 MMHG

## 2019-08-26 DIAGNOSIS — M25.472 ANKLE EDEMA, BILATERAL: ICD-10-CM

## 2019-08-26 DIAGNOSIS — I10 ESSENTIAL HYPERTENSION: ICD-10-CM

## 2019-08-26 DIAGNOSIS — M25.471 ANKLE EDEMA, BILATERAL: ICD-10-CM

## 2019-08-26 DIAGNOSIS — R80.9 CONTROLLED TYPE 2 DIABETES MELLITUS WITH MICROALBUMINURIA, WITHOUT LONG-TERM CURRENT USE OF INSULIN (HCC): ICD-10-CM

## 2019-08-26 DIAGNOSIS — R80.9 PROTEINURIA, UNSPECIFIED TYPE: ICD-10-CM

## 2019-08-26 DIAGNOSIS — R35.1 NOCTURIA: ICD-10-CM

## 2019-08-26 DIAGNOSIS — E11.29 CONTROLLED TYPE 2 DIABETES MELLITUS WITH MICROALBUMINURIA, WITHOUT LONG-TERM CURRENT USE OF INSULIN (HCC): ICD-10-CM

## 2019-08-26 DIAGNOSIS — I10 ESSENTIAL HYPERTENSION: Primary | ICD-10-CM

## 2019-08-26 DIAGNOSIS — L57.0 AK (ACTINIC KERATOSIS): ICD-10-CM

## 2019-08-26 LAB
ANION GAP SERPL CALC-SCNC: 6 MMOL/L (ref 0–18)
BILIRUB UR QL: NEGATIVE
BUN BLD-MCNC: 12 MG/DL (ref 7–18)
BUN/CREAT SERPL: 11.7 (ref 10–20)
CALCIUM BLD-MCNC: 9.3 MG/DL (ref 8.5–10.1)
CHLORIDE SERPL-SCNC: 105 MMOL/L (ref 98–112)
CLARITY UR: CLEAR
CO2 SERPL-SCNC: 34 MMOL/L (ref 21–32)
COLOR UR: YELLOW
CREAT BLD-MCNC: 1.03 MG/DL (ref 0.7–1.3)
GLUCOSE BLD-MCNC: 94 MG/DL (ref 70–99)
GLUCOSE UR-MCNC: NEGATIVE MG/DL
HGB UR QL STRIP.AUTO: NEGATIVE
KETONES UR-MCNC: NEGATIVE MG/DL
LEUKOCYTE ESTERASE UR QL STRIP.AUTO: NEGATIVE
NITRITE UR QL STRIP.AUTO: NEGATIVE
OSMOLALITY SERPL CALC.SUM OF ELEC: 300 MOSM/KG (ref 275–295)
PATIENT FASTING: YES
PH UR: 6 [PH] (ref 5–8)
POTASSIUM SERPL-SCNC: 3.7 MMOL/L (ref 3.5–5.1)
PROT UR-MCNC: NEGATIVE MG/DL
SODIUM SERPL-SCNC: 145 MMOL/L (ref 136–145)
SP GR UR STRIP: 1.01 (ref 1–1.03)
UROBILINOGEN UR STRIP-ACNC: <2
VIT C UR-MCNC: NEGATIVE MG/DL

## 2019-08-26 PROCEDURE — 99214 OFFICE O/P EST MOD 30 MIN: CPT | Performed by: FAMILY MEDICINE

## 2019-08-26 PROCEDURE — 36415 COLL VENOUS BLD VENIPUNCTURE: CPT

## 2019-08-26 PROCEDURE — 81003 URINALYSIS AUTO W/O SCOPE: CPT

## 2019-08-26 PROCEDURE — 80048 BASIC METABOLIC PNL TOTAL CA: CPT

## 2019-08-26 NOTE — PROGRESS NOTES
Patient ID: Creig Canavan is a 66year old male. HPI  Patient presents with:  Hypertension    Pt works downtown as a  for the . States he is feeling well overall. No vision issues or slowed reaction time while driving.   He states h BUNCREA 17.2 07/09/2019    CREATSERUM 0.99 07/09/2019    ANIONGAP 6 07/09/2019    GFRNAA 73 07/09/2019    GFRAA 84 07/09/2019    CA 9.2 07/09/2019    OSMOCALC 297 (H) 07/09/2019    ALKPHO 98 07/09/2019    AST 14 (L) 07/09/2019    ALT 21 07/09/2019    ALKPH CALCNONHDL 48 09/12/2015    CALCNONHDL 79 10/18/2014     TSH (mIU/mL)   Date Value   07/09/2019 1.020     TSH (S) (uIU/mL)   Date Value   09/12/2015 0.65       Lab Results   Component Value Date    IRON 37 (L) 08/12/2017       Lab Results   Component Value LIPOMA REMOVAL  7/15/2016    Left upper back    • PT W/ CORONARY ARTERY STENT  2015            Current Outpatient Medications:  Valsartan-hydroCHLOROthiazide 320-25 MG Oral Tab Take 1 tablet by mouth daily.  Combines valsartan 320+ HCTZ 25 mg daily Disp: 90 and time. Able to repeat \"No if's, and's, or but's\" and name the president, year, and his location. Able to answer all questions appropriately. Skin: Skin is warm. Psychiatry: Normal mood and affect     Vitals reviewed.     Blood pressure 137/75, pulse complete.   Sidney Varela DO, 8/26/2019, 2:05 PM

## 2019-10-15 DIAGNOSIS — E11.29 CONTROLLED TYPE 2 DIABETES MELLITUS WITH MICROALBUMINURIA, WITHOUT LONG-TERM CURRENT USE OF INSULIN (HCC): ICD-10-CM

## 2019-10-15 DIAGNOSIS — R80.9 CONTROLLED TYPE 2 DIABETES MELLITUS WITH MICROALBUMINURIA, WITHOUT LONG-TERM CURRENT USE OF INSULIN (HCC): ICD-10-CM

## 2019-10-16 NOTE — TELEPHONE ENCOUNTER
Refill passed per [facility] protocol.   Diabetes Medications  Protocol Criteria:  · Appointment scheduled in the past 6 months or the next 3 months  · A1C < 7.5 in the past 6 months  · Creatinine in the past 12 months  · Creatinine result < 1.5   Rece

## 2019-10-21 ENCOUNTER — OFFICE VISIT (OUTPATIENT)
Dept: SURGERY | Facility: CLINIC | Age: 79
End: 2019-10-21

## 2019-10-21 VITALS
HEART RATE: 66 BPM | BODY MASS INDEX: 26.84 KG/M2 | TEMPERATURE: 98 F | HEIGHT: 66 IN | SYSTOLIC BLOOD PRESSURE: 125 MMHG | RESPIRATION RATE: 16 BRPM | WEIGHT: 167 LBS | DIASTOLIC BLOOD PRESSURE: 71 MMHG

## 2019-10-21 DIAGNOSIS — N40.1 BENIGN PROSTATIC HYPERPLASIA WITH URINARY FREQUENCY: Primary | ICD-10-CM

## 2019-10-21 DIAGNOSIS — Z87.898 HISTORY OF ELEVATED PSA: ICD-10-CM

## 2019-10-21 DIAGNOSIS — N52.01 ERECTILE DYSFUNCTION DUE TO ARTERIAL INSUFFICIENCY: ICD-10-CM

## 2019-10-21 DIAGNOSIS — R35.0 BENIGN PROSTATIC HYPERPLASIA WITH URINARY FREQUENCY: Primary | ICD-10-CM

## 2019-10-21 DIAGNOSIS — R35.1 NOCTURIA: ICD-10-CM

## 2019-10-21 PROCEDURE — 99214 OFFICE O/P EST MOD 30 MIN: CPT | Performed by: UROLOGY

## 2019-10-21 RX ORDER — TAMSULOSIN HYDROCHLORIDE 0.4 MG/1
0.4 CAPSULE ORAL DAILY
Qty: 90 CAPSULE | Refills: 3 | Status: SHIPPED | OUTPATIENT
Start: 2019-10-21 | End: 2019-11-20

## 2019-10-21 NOTE — PATIENT INSTRUCTIONS
Dev Curran M.D.    1.   Continue tamsulosin 0.4 mg daily    2. Continue finasteride 5 mg daily    3. Return visit in 1 year. Urinalysis urine test 1--10 days before visit with me.     4.   The heart healthy diet is the

## 2019-10-21 NOTE — PROGRESS NOTES
HPI:    Patient ID: Krystian Friend is a 66year old male. HPI   Voiding Dysfunction  Chronic.  Patient has current AUA score of 5, mild voiding dysfunction category, worse compared to previous score of 1, mild voiding dysfunction category, on 9/6/18 per PSA testing.        Review of previous records:  4  negative prostate biopsies in past, namely in 2003 ,  July 2006  and  March 6 2008th, and then February 2, 2009 at [facility]---no cancer found on either side.  Biopsies performed at [facility].  Finasteride started Vickie Rosario Neurological: Negative for speech difficulty. Psychiatric/Behavioral: The patient is not nervous/anxious. tamsulosin HCl 0.4 MG Oral Cap, Take 1 capsule (0.4 mg total) by mouth daily.  Take 1/2 hour following the same meal each day, Disp: 90 ca • PT W/ CORONARY ARTERY STENT  2015      Family History   Problem Relation Age of Onset   • Diabetes Paternal Aunt    • Glaucoma Neg       Social History: Social History    Tobacco Use      Smoking status: Former Smoker        Types: Cigarettes      Smok alert and oriented to person, place, and time. Skin: Skin is dry. Psychiatric: He has a normal mood and affect. Thought content normal.   Nursing note and vitals reviewed.      10/21/19  1738   BP: 125/71   Pulse: 66   Resp: 16   Temp: 98 °F (36.7 °C) the problem but he is no longer taking it. He feels this is stable and decides to observe.     (R35.1) Nocturia  Patient has current AUA score of 5, mild voiding dysfunction category. The patient is taking tamsulosin 0.4 mg daily.  He feels this is stable a described in this documentation. All medical record entries made by the scribe were at my direction and in my presence.   I have reviewed the chart and discharge instructions (if applicable) and agree that the record reflects my personal performance and is

## 2019-10-22 DIAGNOSIS — M25.472 ANKLE EDEMA, BILATERAL: ICD-10-CM

## 2019-10-22 DIAGNOSIS — I10 ESSENTIAL HYPERTENSION: ICD-10-CM

## 2019-10-22 DIAGNOSIS — M25.471 ANKLE EDEMA, BILATERAL: ICD-10-CM

## 2019-10-24 RX ORDER — SPIRONOLACTONE 25 MG/1
25 TABLET ORAL DAILY
Qty: 90 TABLET | Refills: 1 | Status: ON HOLD | OUTPATIENT
Start: 2019-10-24 | End: 2020-02-06

## 2019-10-24 NOTE — TELEPHONE ENCOUNTER
Refill passed per [facility] protocol.   Hypertensive Medications  Protocol Criteria:  · Appointment scheduled in the past 6 months or in the next 3 months  · BMP or CMP in the past 12 months  · Creatinine result < 2  Recent Outpatient Visits

## 2019-10-28 ENCOUNTER — OFFICE VISIT (OUTPATIENT)
Dept: FAMILY MEDICINE CLINIC | Facility: CLINIC | Age: 79
End: 2019-10-28

## 2019-10-28 ENCOUNTER — APPOINTMENT (OUTPATIENT)
Dept: LAB | Age: 79
End: 2019-10-28
Attending: UROLOGY
Payer: COMMERCIAL

## 2019-10-28 VITALS
SYSTOLIC BLOOD PRESSURE: 108 MMHG | HEART RATE: 62 BPM | HEIGHT: 66 IN | DIASTOLIC BLOOD PRESSURE: 60 MMHG | TEMPERATURE: 97 F | BODY MASS INDEX: 26.36 KG/M2 | WEIGHT: 164 LBS

## 2019-10-28 DIAGNOSIS — R80.9 CONTROLLED TYPE 2 DIABETES MELLITUS WITH MICROALBUMINURIA, WITHOUT LONG-TERM CURRENT USE OF INSULIN (HCC): Primary | ICD-10-CM

## 2019-10-28 DIAGNOSIS — I10 ESSENTIAL HYPERTENSION, BENIGN: ICD-10-CM

## 2019-10-28 DIAGNOSIS — E11.29 CONTROLLED TYPE 2 DIABETES MELLITUS WITH MICROALBUMINURIA, WITHOUT LONG-TERM CURRENT USE OF INSULIN (HCC): Primary | ICD-10-CM

## 2019-10-28 DIAGNOSIS — E11.29 CONTROLLED TYPE 2 DIABETES MELLITUS WITH MICROALBUMINURIA, WITHOUT LONG-TERM CURRENT USE OF INSULIN (HCC): ICD-10-CM

## 2019-10-28 DIAGNOSIS — R80.9 CONTROLLED TYPE 2 DIABETES MELLITUS WITH MICROALBUMINURIA, WITHOUT LONG-TERM CURRENT USE OF INSULIN (HCC): ICD-10-CM

## 2019-10-28 DIAGNOSIS — E78.2 MIXED HYPERLIPIDEMIA: ICD-10-CM

## 2019-10-28 DIAGNOSIS — N40.1 BENIGN NON-NODULAR PROSTATIC HYPERPLASIA WITH LOWER URINARY TRACT SYMPTOMS: ICD-10-CM

## 2019-10-28 PROCEDURE — 99214 OFFICE O/P EST MOD 30 MIN: CPT | Performed by: FAMILY MEDICINE

## 2019-10-28 PROCEDURE — 80048 BASIC METABOLIC PNL TOTAL CA: CPT

## 2019-10-28 PROCEDURE — 83036 HEMOGLOBIN GLYCOSYLATED A1C: CPT

## 2019-10-28 PROCEDURE — 36415 COLL VENOUS BLD VENIPUNCTURE: CPT

## 2019-10-28 RX ORDER — CLONIDINE 0.1 MG/24H
PATCH, EXTENDED RELEASE TRANSDERMAL
Qty: 12 PATCH | Refills: 1 | Status: ON HOLD | OUTPATIENT
Start: 2019-10-28 | End: 2020-02-06

## 2019-10-28 NOTE — PROGRESS NOTES
Patient ID: Kiet Goff is a 66year old male. HPI  Patient presents with:  Hypertension: follow up    Last seen by me on 8/26/19. Saw Dr. Arturo Beth for urology in October 2019. Pt uses pads all day for loss of urine.  Dr. Arturo Beth told him he m 08/26/2019    BUNCREA 11.7 08/26/2019    ANIONGAP 6 08/26/2019    GFRAA 80 08/26/2019    GFRNAA 69 08/26/2019    CA 9.3 08/26/2019     08/26/2019    K 3.7 08/26/2019     08/26/2019    CO2 34.0 (H) 08/26/2019    OSMOCALC 300 (H) 08/26/2019 Results   Component Value Date    PSA 2.0 08/12/2017    TOTPSASCREEN 3.0 10/18/2014         Wt Readings from Last 6 Encounters:  10/28/19 : 164 lb (74.4 kg)  10/21/19 : 167 lb (75.8 kg)  08/26/19 : 166 lb (75.3 kg)  07/29/19 : 169 lb 6.4 oz (76.8 kg)  07/0 Disp: 90 tablet, Rfl: 1  Valsartan-hydroCHLOROthiazide 320-25 MG Oral Tab, Take 1 tablet by mouth daily. Combines valsartan 320+ HCTZ 25 mg daily, Disp: 90 tablet, Rfl: 1  atorvastatin 80 MG Oral Tab, Take 1 tablet (80 mg total) by mouth once daily. , Disp: 107/65 108/60   Pulse: 62    Temp: 97.4 °F (36.3 °C)    TempSrc: Oral    Weight: 164 lb (74.4 kg)    Height: 5' 6\" (1.676 m)            ASSESSMENT/PLAN:     Pt will perform fasting labs today.     Diagnoses and all orders for this visit:    Controlled type AM

## 2019-11-08 ENCOUNTER — APPOINTMENT (OUTPATIENT)
Dept: LAB | Age: 79
End: 2019-11-08
Attending: UROLOGY
Payer: COMMERCIAL

## 2019-11-08 DIAGNOSIS — N28.9 RENAL INSUFFICIENCY: ICD-10-CM

## 2019-11-08 PROCEDURE — 36415 COLL VENOUS BLD VENIPUNCTURE: CPT

## 2019-11-08 PROCEDURE — 80048 BASIC METABOLIC PNL TOTAL CA: CPT

## 2019-12-06 DIAGNOSIS — I10 ESSENTIAL HYPERTENSION, BENIGN: ICD-10-CM

## 2019-12-06 NOTE — TELEPHONE ENCOUNTER
Spoke to patient, he states he believes he is on the combination Valsartan-HCTZ 320-25 mg but cannot confirm. States he is at work right now and that when he goes home, he will check his pill bottle and call our office back.

## 2019-12-07 RX ORDER — VALSARTAN 320 MG/1
TABLET ORAL
Qty: 90 TABLET | Refills: 1 | OUTPATIENT
Start: 2019-12-07

## 2020-01-10 DIAGNOSIS — I25.10 CORONARY ARTERY DISEASE INVOLVING NATIVE CORONARY ARTERY OF NATIVE HEART WITHOUT ANGINA PECTORIS: ICD-10-CM

## 2020-01-10 RX ORDER — FUROSEMIDE 40 MG/1
TABLET ORAL
Qty: 90 TABLET | Refills: 1 | Status: SHIPPED | OUTPATIENT
Start: 2020-01-10

## 2020-01-24 ENCOUNTER — APPOINTMENT (OUTPATIENT)
Dept: LAB | Age: 80
End: 2020-01-24
Attending: FAMILY MEDICINE
Payer: COMMERCIAL

## 2020-01-24 ENCOUNTER — OFFICE VISIT (OUTPATIENT)
Dept: FAMILY MEDICINE CLINIC | Facility: CLINIC | Age: 80
End: 2020-01-24

## 2020-01-24 VITALS
HEIGHT: 66 IN | BODY MASS INDEX: 26.33 KG/M2 | HEART RATE: 67 BPM | SYSTOLIC BLOOD PRESSURE: 80 MMHG | DIASTOLIC BLOOD PRESSURE: 50 MMHG | TEMPERATURE: 97 F | WEIGHT: 163.81 LBS

## 2020-01-24 DIAGNOSIS — M54.9 UPPER BACK PAIN: ICD-10-CM

## 2020-01-24 DIAGNOSIS — I10 ESSENTIAL HYPERTENSION, BENIGN: ICD-10-CM

## 2020-01-24 DIAGNOSIS — I25.10 CORONARY ARTERY DISEASE INVOLVING NATIVE CORONARY ARTERY OF NATIVE HEART WITHOUT ANGINA PECTORIS: ICD-10-CM

## 2020-01-24 DIAGNOSIS — R80.9 CONTROLLED TYPE 2 DIABETES MELLITUS WITH MICROALBUMINURIA, WITHOUT LONG-TERM CURRENT USE OF INSULIN (HCC): Primary | ICD-10-CM

## 2020-01-24 DIAGNOSIS — R80.9 CONTROLLED TYPE 2 DIABETES MELLITUS WITH MICROALBUMINURIA, WITHOUT LONG-TERM CURRENT USE OF INSULIN (HCC): ICD-10-CM

## 2020-01-24 DIAGNOSIS — E11.29 CONTROLLED TYPE 2 DIABETES MELLITUS WITH MICROALBUMINURIA, WITHOUT LONG-TERM CURRENT USE OF INSULIN (HCC): Primary | ICD-10-CM

## 2020-01-24 DIAGNOSIS — E11.29 CONTROLLED TYPE 2 DIABETES MELLITUS WITH MICROALBUMINURIA, WITHOUT LONG-TERM CURRENT USE OF INSULIN (HCC): ICD-10-CM

## 2020-01-24 LAB
ANION GAP SERPL CALC-SCNC: 6 MMOL/L (ref 0–18)
BUN BLD-MCNC: 57 MG/DL (ref 7–18)
BUN/CREAT SERPL: 28.8 (ref 10–20)
CALCIUM BLD-MCNC: 9.4 MG/DL (ref 8.5–10.1)
CHLORIDE SERPL-SCNC: 106 MMOL/L (ref 98–112)
CO2 SERPL-SCNC: 26 MMOL/L (ref 21–32)
CREAT BLD-MCNC: 1.98 MG/DL (ref 0.7–1.3)
EST. AVERAGE GLUCOSE BLD GHB EST-MCNC: 137 MG/DL (ref 68–126)
GLUCOSE BLD-MCNC: 100 MG/DL (ref 70–99)
HBA1C MFR BLD HPLC: 6.4 % (ref ?–5.7)
OSMOLALITY SERPL CALC.SUM OF ELEC: 302 MOSM/KG (ref 275–295)
PATIENT FASTING Y/N/NP: YES
POTASSIUM SERPL-SCNC: 4.5 MMOL/L (ref 3.5–5.1)
SODIUM SERPL-SCNC: 138 MMOL/L (ref 136–145)

## 2020-01-24 PROCEDURE — 83036 HEMOGLOBIN GLYCOSYLATED A1C: CPT

## 2020-01-24 PROCEDURE — 80048 BASIC METABOLIC PNL TOTAL CA: CPT

## 2020-01-24 PROCEDURE — 36415 COLL VENOUS BLD VENIPUNCTURE: CPT

## 2020-01-24 PROCEDURE — 99214 OFFICE O/P EST MOD 30 MIN: CPT | Performed by: FAMILY MEDICINE

## 2020-01-24 RX ORDER — VALSARTAN 320 MG/1
320 TABLET ORAL DAILY
Qty: 90 TABLET | Refills: 1 | Status: ON HOLD | OUTPATIENT
Start: 2020-01-24 | End: 2020-02-06

## 2020-01-24 RX ORDER — FUROSEMIDE 40 MG/1
TABLET ORAL
COMMUNITY
Start: 2020-01-10 | End: 2020-03-14

## 2020-01-24 RX ORDER — FUROSEMIDE 40 MG/1
TABLET ORAL
COMMUNITY
Start: 2020-01-22 | End: 2020-01-24

## 2020-01-24 RX ORDER — TAMSULOSIN HYDROCHLORIDE 0.4 MG/1
CAPSULE ORAL
COMMUNITY
Start: 2020-01-21 | End: 2020-09-21

## 2020-01-24 NOTE — PROGRESS NOTES
Patient ID: Gio Guzman is a 78year old male. HPI  Patient presents with:  Diabetes: follow up  Hypertension: follow up    Last seen by me on 10/28/19. I reviewed labs with the pt. His A1C was 7.0 on 10/28/19. States he feels well overall.  Linette 07/09/2019    ALB 3.9 07/09/2019    GLOBULIN 4.3 07/09/2019    AGRATIO 0.9 (L) 06/21/2016     11/08/2019    K 4.3 11/08/2019     11/08/2019    CO2 32.0 11/08/2019       Lab Results   Component Value Date    GLU 96 11/08/2019    BUN 53 (H) 11/08 Value Date    SAT 11 (L) 08/12/2017       Lab Results   Component Value Date    IRON 37 (L) 08/12/2017    TRANSFERRIN 262 08/12/2017    TIBCP 346 08/12/2017    SAT 11 (L) 08/12/2017       Lab Results   Component Value Date    KHANH 52 08/12/2017       Lab Re Dispense Refill   • furosemide 40 MG Oral Tab TAKE 1 TABLET BY MOUTH EVERY DAY     • tamsulosin (FLOMAX) cap      • metoprolol Tartrate 25 MG Oral Tab STAN MEDIA OBIE RAMIREZ AL CINDY 90 tablet 1   • cloNIDine 0.1 MG/24HR Transdermal Patch Weekly APPLY 95/53, pulse 67, temperature 97.1 °F (36.2 °C), temperature source Oral, height 5' 6\" (1.676 m), weight 163 lb 12.8 oz (74.3 kg).    01/24/20  0818 01/24/20  0839   BP: 95/53 (!) 80/50   Pulse: 67    Temp: 97.1 °F (36.2 °C)    TempSrc: Oral    Weight: 163

## 2020-02-03 ENCOUNTER — HOSPITAL ENCOUNTER (INPATIENT)
Facility: HOSPITAL | Age: 80
LOS: 3 days | Discharge: HOME | DRG: 378 | End: 2020-02-06
Attending: EMERGENCY MEDICINE | Admitting: HOSPITALIST
Payer: COMMERCIAL

## 2020-02-03 ENCOUNTER — APPOINTMENT (OUTPATIENT)
Dept: GENERAL RADIOLOGY | Facility: HOSPITAL | Age: 80
DRG: 378 | End: 2020-02-03
Attending: EMERGENCY MEDICINE
Payer: COMMERCIAL

## 2020-02-03 ENCOUNTER — OFFICE VISIT (OUTPATIENT)
Dept: FAMILY MEDICINE CLINIC | Facility: CLINIC | Age: 80
End: 2020-02-03

## 2020-02-03 VITALS
TEMPERATURE: 97 F | SYSTOLIC BLOOD PRESSURE: 90 MMHG | HEART RATE: 72 BPM | HEIGHT: 66 IN | WEIGHT: 160.63 LBS | DIASTOLIC BLOOD PRESSURE: 45 MMHG | BODY MASS INDEX: 25.81 KG/M2 | OXYGEN SATURATION: 99 %

## 2020-02-03 DIAGNOSIS — R06.00 DOE (DYSPNEA ON EXERTION): Primary | ICD-10-CM

## 2020-02-03 DIAGNOSIS — N18.30 ACUTE WORSENING OF STAGE 3 CHRONIC KIDNEY DISEASE (HCC): ICD-10-CM

## 2020-02-03 DIAGNOSIS — N28.9 ACUTE RENAL INSUFFICIENCY: ICD-10-CM

## 2020-02-03 DIAGNOSIS — R23.1 PALLOR: ICD-10-CM

## 2020-02-03 DIAGNOSIS — D64.9 ANEMIA, UNSPECIFIED TYPE: ICD-10-CM

## 2020-02-03 DIAGNOSIS — I10 ESSENTIAL HYPERTENSION, BENIGN: ICD-10-CM

## 2020-02-03 DIAGNOSIS — I25.10 CORONARY ARTERY DISEASE INVOLVING NATIVE CORONARY ARTERY OF NATIVE HEART WITHOUT ANGINA PECTORIS: ICD-10-CM

## 2020-02-03 DIAGNOSIS — E11.9 CONTROLLED TYPE 2 DIABETES MELLITUS WITHOUT COMPLICATION, WITHOUT LONG-TERM CURRENT USE OF INSULIN (HCC): ICD-10-CM

## 2020-02-03 DIAGNOSIS — L03.031 CELLULITIS OF RIGHT TOE: ICD-10-CM

## 2020-02-03 DIAGNOSIS — L03.031 CELLULITIS OF THIRD TOE OF RIGHT FOOT: ICD-10-CM

## 2020-02-03 DIAGNOSIS — R06.00 DYSPNEA ON EXERTION: Primary | ICD-10-CM

## 2020-02-03 DIAGNOSIS — I95.1 ORTHOSTATIC HYPOTENSION: ICD-10-CM

## 2020-02-03 DIAGNOSIS — R42 DIZZINESSES: ICD-10-CM

## 2020-02-03 PROBLEM — R06.09 DYSPNEA ON EXERTION: Status: ACTIVE | Noted: 2020-02-03

## 2020-02-03 PROBLEM — N17.9 ACUTE KIDNEY INJURY: Status: ACTIVE | Noted: 2020-02-03

## 2020-02-03 PROBLEM — N17.9 ACUTE KIDNEY INJURY (HCC): Status: ACTIVE | Noted: 2020-02-03

## 2020-02-03 LAB
ANION GAP SERPL CALC-SCNC: 5 MMOL/L (ref 0–18)
BASOPHILS # BLD AUTO: 0.04 X10(3) UL (ref 0–0.2)
BASOPHILS NFR BLD AUTO: 0.3 %
BUN BLD-MCNC: 62 MG/DL (ref 7–18)
BUN/CREAT SERPL: 27.4 (ref 10–20)
C DIFF TOX B STL QL: NEGATIVE
CALCIUM BLD-MCNC: 9.5 MG/DL (ref 8.5–10.1)
CHLORIDE SERPL-SCNC: 108 MMOL/L (ref 98–112)
CO2 SERPL-SCNC: 25 MMOL/L (ref 21–32)
CREAT BLD-MCNC: 2.26 MG/DL (ref 0.7–1.3)
DEPRECATED HBV CORE AB SER IA-ACNC: 17.1 NG/ML (ref 30–530)
DEPRECATED RDW RBC AUTO: 47.9 FL (ref 35.1–46.3)
EOSINOPHIL # BLD AUTO: 0.09 X10(3) UL (ref 0–0.7)
EOSINOPHIL NFR BLD AUTO: 0.7 %
ERYTHROCYTE [DISTWIDTH] IN BLOOD BY AUTOMATED COUNT: 14.8 % (ref 11–15)
GLUCOSE BLD-MCNC: 106 MG/DL (ref 70–99)
GLUCOSE BLDC GLUCOMTR-MCNC: 101 MG/DL (ref 70–99)
GLUCOSE BLDC GLUCOMTR-MCNC: 105 MG/DL (ref 70–99)
GLUCOSE BLDC GLUCOMTR-MCNC: 83 MG/DL (ref 70–99)
HCT VFR BLD AUTO: 28.7 % (ref 39–53)
HGB BLD-MCNC: 9 G/DL (ref 13–17.5)
IMM GRANULOCYTES # BLD AUTO: 0.07 X10(3) UL (ref 0–1)
IMM GRANULOCYTES NFR BLD: 0.5 %
IRON SERPL-MCNC: 15 UG/DL (ref 65–175)
LYMPHOCYTES # BLD AUTO: 0.85 X10(3) UL (ref 1–4)
LYMPHOCYTES NFR BLD AUTO: 6.5 %
MCH RBC QN AUTO: 27.5 PG (ref 26–34)
MCHC RBC AUTO-ENTMCNC: 31.4 G/DL (ref 31–37)
MCV RBC AUTO: 87.8 FL (ref 80–100)
MONOCYTES # BLD AUTO: 0.72 X10(3) UL (ref 0.1–1)
MONOCYTES NFR BLD AUTO: 5.5 %
NEUTROPHILS # BLD AUTO: 11.29 X10 (3) UL (ref 1.5–7.7)
NEUTROPHILS # BLD AUTO: 11.29 X10(3) UL (ref 1.5–7.7)
NEUTROPHILS NFR BLD AUTO: 86.5 %
OSMOLALITY SERPL CALC.SUM OF ELEC: 304 MOSM/KG (ref 275–295)
PLATELET # BLD AUTO: 335 10(3)UL (ref 150–450)
POTASSIUM SERPL-SCNC: 5.6 MMOL/L (ref 3.5–5.1)
RBC # BLD AUTO: 3.27 X10(6)UL (ref 3.8–5.8)
SODIUM SERPL-SCNC: 138 MMOL/L (ref 136–145)
TROPONIN I SERPL-MCNC: <0.045 NG/ML (ref ?–0.04)
TROPONIN I SERPL-MCNC: <0.045 NG/ML (ref ?–0.04)
WBC # BLD AUTO: 13.1 X10(3) UL (ref 4–11)

## 2020-02-03 PROCEDURE — 99215 OFFICE O/P EST HI 40 MIN: CPT | Performed by: FAMILY MEDICINE

## 2020-02-03 PROCEDURE — 71046 X-RAY EXAM CHEST 2 VIEWS: CPT | Performed by: EMERGENCY MEDICINE

## 2020-02-03 PROCEDURE — 99254 IP/OBS CNSLTJ NEW/EST MOD 60: CPT | Performed by: INTERNAL MEDICINE

## 2020-02-03 PROCEDURE — 99223 1ST HOSP IP/OBS HIGH 75: CPT | Performed by: HOSPITALIST

## 2020-02-03 PROCEDURE — 99222 1ST HOSP IP/OBS MODERATE 55: CPT | Performed by: INTERNAL MEDICINE

## 2020-02-03 PROCEDURE — 73660 X-RAY EXAM OF TOE(S): CPT | Performed by: EMERGENCY MEDICINE

## 2020-02-03 RX ORDER — SODIUM CHLORIDE 0.9 % (FLUSH) 0.9 %
3 SYRINGE (ML) INJECTION AS NEEDED
Status: DISCONTINUED | OUTPATIENT
Start: 2020-02-03 | End: 2020-02-06

## 2020-02-03 RX ORDER — ACETAMINOPHEN 325 MG/1
650 TABLET ORAL EVERY 6 HOURS PRN
Status: DISCONTINUED | OUTPATIENT
Start: 2020-02-03 | End: 2020-02-06

## 2020-02-03 RX ORDER — HEPARIN SODIUM 5000 [USP'U]/ML
5000 INJECTION, SOLUTION INTRAVENOUS; SUBCUTANEOUS EVERY 12 HOURS SCHEDULED
Status: DISCONTINUED | OUTPATIENT
Start: 2020-02-03 | End: 2020-02-06

## 2020-02-03 RX ORDER — MORPHINE SULFATE 4 MG/ML
4 INJECTION, SOLUTION INTRAMUSCULAR; INTRAVENOUS EVERY 2 HOUR PRN
Status: DISCONTINUED | OUTPATIENT
Start: 2020-02-03 | End: 2020-02-06

## 2020-02-03 RX ORDER — MORPHINE SULFATE 2 MG/ML
1 INJECTION, SOLUTION INTRAMUSCULAR; INTRAVENOUS EVERY 2 HOUR PRN
Status: DISCONTINUED | OUTPATIENT
Start: 2020-02-03 | End: 2020-02-06

## 2020-02-03 RX ORDER — HYDROCODONE BITARTRATE AND ACETAMINOPHEN 5; 325 MG/1; MG/1
1 TABLET ORAL EVERY 4 HOURS PRN
Status: DISCONTINUED | OUTPATIENT
Start: 2020-02-03 | End: 2020-02-06

## 2020-02-03 RX ORDER — SODIUM CHLORIDE 9 MG/ML
INJECTION, SOLUTION INTRAVENOUS CONTINUOUS
Status: DISCONTINUED | OUTPATIENT
Start: 2020-02-03 | End: 2020-02-05

## 2020-02-03 RX ORDER — DEXTROSE MONOHYDRATE 25 G/50ML
50 INJECTION, SOLUTION INTRAVENOUS AS NEEDED
Status: DISCONTINUED | OUTPATIENT
Start: 2020-02-03 | End: 2020-02-06

## 2020-02-03 RX ORDER — HYDROCODONE BITARTRATE AND ACETAMINOPHEN 5; 325 MG/1; MG/1
2 TABLET ORAL EVERY 4 HOURS PRN
Status: DISCONTINUED | OUTPATIENT
Start: 2020-02-03 | End: 2020-02-06

## 2020-02-03 RX ORDER — ONDANSETRON 2 MG/ML
4 INJECTION INTRAMUSCULAR; INTRAVENOUS EVERY 6 HOURS PRN
Status: DISCONTINUED | OUTPATIENT
Start: 2020-02-03 | End: 2020-02-06

## 2020-02-03 RX ORDER — MORPHINE SULFATE 2 MG/ML
2 INJECTION, SOLUTION INTRAMUSCULAR; INTRAVENOUS EVERY 2 HOUR PRN
Status: DISCONTINUED | OUTPATIENT
Start: 2020-02-03 | End: 2020-02-06

## 2020-02-03 RX ORDER — ACETAMINOPHEN 325 MG/1
650 TABLET ORAL EVERY 4 HOURS PRN
Status: DISCONTINUED | OUTPATIENT
Start: 2020-02-03 | End: 2020-02-06

## 2020-02-03 NOTE — ED INITIAL ASSESSMENT (HPI)
Patient here for three days of exertional dyspnea,hypotension, dizziness and decreasing kidney function per his doctor. Patient denies SOB or pain at this time.

## 2020-02-03 NOTE — ED PROVIDER NOTES
Patient Seen in: [facility] Emergency Department      History   Patient presents with:  Dyspnea TEN SOB    Stated Complaint: dizziness, sob    HPI    78year old male with bph, cad s/p stents, dm, htn who presents with shortness of breath on exert BP 02/03/20 1059 109/47   Pulse 02/03/20 1059 69   Resp 02/03/20 1059 20   Temp 02/03/20 1059 98.1 °F (36.7 °C)   Temp src 02/03/20 1600 Oral   SpO2 02/03/20 1059 96 %   O2 Device 02/03/20 1059 None (Room air)       Current:/67 (BP Location: Right Potassium 5.6 (*)     BUN 62 (*)     Creatinine 2.26 (*)     BUN/CREA Ratio 27.4 (*)     Calculated Osmolality 304 (*)     GFR, Non- 27 (*)     GFR, -American 31 (*)     All other components within normal limits   IRON, SERUM - A Encounters:  02/03/20 : 78  , sinus, normal for rate and rhythm     Radiology findings: Xr Chest Pa + Lat Chest (cpt=71046)    Result Date: 2/3/2020  CONCLUSION:  Expiratory chest without acute cardiopulmonary process.     Dictated by (CST): Sarika Cevallos 2:30 pm    Follow-up:  No follow-up provider specified. We recommend that you schedule follow up care with a primary care provider within the next three months to obtain basic health screening including reassessment of your blood pressure.     Medications

## 2020-02-03 NOTE — H&P
[facility]    PATIENT'S NAME: Janine Bence PHYSICIAN: Maty Mackay MD   PATIENT ACCOUNT#:   [id number]    LOCATION:  [facility] [address] [facility] 1  MEDICAL RECORD #:   [id number]       YOB: 1940  ADMISSION DATE:       02/03/2 arthritic pain. In the last week also, he has noted some pain and discomfort is in his right third toe and erythema with discomfort. His appetite has been decreased but no pain when he eats or drinks. He feels lightheaded and dizzy and fatigued.   Other Pj Acosta MD  d: 02/03/2020 14:38:34  t: 02/03/2020 15:42:05  [facility] [id number]  /

## 2020-02-03 NOTE — CONSULTS
[facility]    PATIENT'S NAME: [place], [place]   ATTENDING PHYSICIAN: Garrett Zarco MD   CONSULTING PHYSICIAN: Isidra Lockett.  Constanza Hernandez MD   PATIENT ACCOUNT#:   [id number]    LOCATION:  [address] [place] [address] #:   [id number]       DATE OF BIRTH:  [birth year] respirations 18, unlabored; saturations 96% on room air; afebrile. HEENT:  Unremarkable. NECK:  Supple. Jugular venous pressure normal.  Carotids normal.  No bruits. No thyromegaly. LUNGS:  Clear.   HEART:  S1, S2 normal.  No gallop, murmur, rub, or cl [address] [facility] [id number]  [facility]

## 2020-02-03 NOTE — PROGRESS NOTES
Patient ID: Dina Mazariegos is a 78year old male. HPI  Patient presents with:  Medication Follow-Up: b/p medication ,pt states has been experiencing dizziness,shortness of breath and vomiting with new bp med    I saw him recently.   We adjusted his med for vomiting. Negative for abdominal pain. Skin: Negative for color change. Neurological: Positive for dizziness. Negative for speech difficulty and weakness. Psychiatric/Behavioral: The patient is not nervous/anxious.           Past Medical History: Physical Exam   Physical Exam   Constitutional: Patient is oriented to person, place, and time. Patient appears well-developed and well-nourished. No distress. HENT:   Head: Normocephalic.    Right Ear: Tympanic membrane, external ear and ear canal norm do not see this at this time. He has brisk refill.   Orthostatic hypotension  Dizziness upon standing  Acute worsening of stage 3 chronic kidney disease ([facility] [address]  Kidney function has worsened per last lab  Coronary artery disease involving native coronary art

## 2020-02-03 NOTE — CONSULTS
Cardiology (consult dictated)    Assessment:  1.  GI bleeding. Moderate anemia. Initially borderline blood pressure now improved post fluids. 2.  CAD, status post PCI of LAD and diagonal, 2015. 3.  Normal LV systolic function. History of HFpEF.   A

## 2020-02-03 NOTE — CONSULTS
[facility]   Gastroenterology Consultation Note    Creig Canavan  Patient Status:    Emergency  Date of Admission:         2/3/2020, Hospital day #0  Attending:   Sonal Montana MD  PCP:     Tsering Agudelo DO    [facility] for [facility] SERVICES, [facility] included cigarettes. He has never used smokeless tobacco. He reports that he does not drink alcohol or use drugs.     Allergies:  No Known Allergies    Medications:    Current Facility-Administered Medications:   •  sodium chloride 0.9% IV bolus 1,000 mL, 1 chest without acute cardiopulmonary process.     Dictated by (CST): Rosalie Hollingsworth MD on 2/03/2020 at 13:13     Approved by (CST): Rosalie Hollingsworth MD on 2/03/2020 at 13:14          Xr Toe(s) (min 2 Views), Right 3rd (cpt=73660)    Result Date: 2/3/20 this patient.     EGD consent: I have discussed the risks, benefits, and alternatives to upper endoscopy/enteroscopy with the patient [who demonstrated understanding], including but not limited to the risks of bleeding, infection, pain, as well as the risks

## 2020-02-03 NOTE — PROGRESS NOTES
[facility] Pharmacy Note:  Renal Adjustment for cefazolin (ANCEF)    Mariya Mondragon is a 78year old male who has been prescribed cefazolin (ANCEF) 1000 mg every 8 hrs. CrCl is estimated creatinine clearance is 25.6 mL/min (A) (based on SCr of 2.26 mg/dL (H)).

## 2020-02-04 ENCOUNTER — ANESTHESIA (OUTPATIENT)
Dept: ENDOSCOPY | Facility: HOSPITAL | Age: 80
DRG: 378 | End: 2020-02-04
Payer: COMMERCIAL

## 2020-02-04 ENCOUNTER — ANESTHESIA EVENT (OUTPATIENT)
Dept: ENDOSCOPY | Facility: HOSPITAL | Age: 80
DRG: 378 | End: 2020-02-04
Payer: COMMERCIAL

## 2020-02-04 LAB
ANION GAP SERPL CALC-SCNC: 5 MMOL/L (ref 0–18)
BASOPHILS # BLD AUTO: 0.03 X10(3) UL (ref 0–0.2)
BASOPHILS NFR BLD AUTO: 0.3 %
BILIRUB UR QL: NEGATIVE
BUN BLD-MCNC: 46 MG/DL (ref 7–18)
BUN/CREAT SERPL: 26.1 (ref 10–20)
CALCIUM BLD-MCNC: 8.2 MG/DL (ref 8.5–10.1)
CHLORIDE SERPL-SCNC: 110 MMOL/L (ref 98–112)
CHLORIDE UR-SCNC: 111 MMOL/L
CLARITY UR: CLEAR
CO2 SERPL-SCNC: 24 MMOL/L (ref 21–32)
COLOR UR: YELLOW
CREAT BLD-MCNC: 1.76 MG/DL (ref 0.7–1.3)
CREAT UR-SCNC: 46 MG/DL
DEPRECATED RDW RBC AUTO: 47.6 FL (ref 35.1–46.3)
EOSINOPHIL # BLD AUTO: 0.28 X10(3) UL (ref 0–0.7)
EOSINOPHIL NFR BLD AUTO: 3.1 %
ERYTHROCYTE [DISTWIDTH] IN BLOOD BY AUTOMATED COUNT: 14.9 % (ref 11–15)
GLUCOSE BLD-MCNC: 98 MG/DL (ref 70–99)
GLUCOSE BLDC GLUCOMTR-MCNC: 109 MG/DL (ref 70–99)
GLUCOSE BLDC GLUCOMTR-MCNC: 83 MG/DL (ref 70–99)
GLUCOSE BLDC GLUCOMTR-MCNC: 86 MG/DL (ref 70–99)
GLUCOSE BLDC GLUCOMTR-MCNC: 94 MG/DL (ref 70–99)
GLUCOSE BLDC GLUCOMTR-MCNC: 97 MG/DL (ref 70–99)
GLUCOSE UR-MCNC: NEGATIVE MG/DL
HCT VFR BLD AUTO: 24.1 % (ref 39–53)
HCT VFR BLD AUTO: 25.4 % (ref 39–53)
HGB BLD-MCNC: 7.7 G/DL (ref 13–17.5)
HGB BLD-MCNC: 8 G/DL (ref 13–17.5)
HGB UR QL STRIP.AUTO: NEGATIVE
IMM GRANULOCYTES # BLD AUTO: 0.04 X10(3) UL (ref 0–1)
IMM GRANULOCYTES NFR BLD: 0.4 %
KETONES UR-MCNC: NEGATIVE MG/DL
LEUKOCYTE ESTERASE UR QL STRIP.AUTO: NEGATIVE
LYMPHOCYTES # BLD AUTO: 1.63 X10(3) UL (ref 1–4)
LYMPHOCYTES NFR BLD AUTO: 18.2 %
MCH RBC QN AUTO: 28.2 PG (ref 26–34)
MCHC RBC AUTO-ENTMCNC: 32 G/DL (ref 31–37)
MCV RBC AUTO: 88.3 FL (ref 80–100)
MONOCYTES # BLD AUTO: 1.02 X10(3) UL (ref 0.1–1)
MONOCYTES NFR BLD AUTO: 11.4 %
NEUTROPHILS # BLD AUTO: 5.96 X10 (3) UL (ref 1.5–7.7)
NEUTROPHILS # BLD AUTO: 5.96 X10(3) UL (ref 1.5–7.7)
NEUTROPHILS NFR BLD AUTO: 66.6 %
NITRITE UR QL STRIP.AUTO: NEGATIVE
OSMOLALITY SERPL CALC.SUM OF ELEC: 300 MOSM/KG (ref 275–295)
PH UR: 6 [PH] (ref 5–8)
PLATELET # BLD AUTO: 256 10(3)UL (ref 150–450)
POTASSIUM SERPL-SCNC: 4.8 MMOL/L (ref 3.5–5.1)
POTASSIUM UR-SCNC: 22.5 MMOL/L
PROT UR-MCNC: NEGATIVE MG/DL
RBC # BLD AUTO: 2.73 X10(6)UL (ref 3.8–5.8)
SODIUM SERPL-SCNC: 104 MMOL/L
SODIUM SERPL-SCNC: 139 MMOL/L (ref 136–145)
SP GR UR STRIP: 1.01 (ref 1–1.03)
UROBILINOGEN UR STRIP-ACNC: <2
WBC # BLD AUTO: 9 X10(3) UL (ref 4–11)

## 2020-02-04 PROCEDURE — 43235 EGD DIAGNOSTIC BRUSH WASH: CPT | Performed by: INTERNAL MEDICINE

## 2020-02-04 PROCEDURE — 99233 SBSQ HOSP IP/OBS HIGH 50: CPT | Performed by: HOSPITALIST

## 2020-02-04 PROCEDURE — 99232 SBSQ HOSP IP/OBS MODERATE 35: CPT | Performed by: NURSE PRACTITIONER

## 2020-02-04 PROCEDURE — 0DJ08ZZ INSPECTION OF UPPER INTESTINAL TRACT, VIA NATURAL OR ARTIFICIAL OPENING ENDOSCOPIC: ICD-10-PCS | Performed by: INTERNAL MEDICINE

## 2020-02-04 RX ORDER — SODIUM CHLORIDE, SODIUM LACTATE, POTASSIUM CHLORIDE, CALCIUM CHLORIDE 600; 310; 30; 20 MG/100ML; MG/100ML; MG/100ML; MG/100ML
INJECTION, SOLUTION INTRAVENOUS CONTINUOUS PRN
Status: DISCONTINUED | OUTPATIENT
Start: 2020-02-04 | End: 2020-02-04

## 2020-02-04 RX ORDER — DEXTROSE MONOHYDRATE 25 G/50ML
50 INJECTION, SOLUTION INTRAVENOUS
Status: DISCONTINUED | OUTPATIENT
Start: 2020-02-04 | End: 2020-02-04 | Stop reason: HOSPADM

## 2020-02-04 RX ORDER — SODIUM CHLORIDE, SODIUM LACTATE, POTASSIUM CHLORIDE, CALCIUM CHLORIDE 600; 310; 30; 20 MG/100ML; MG/100ML; MG/100ML; MG/100ML
INJECTION, SOLUTION INTRAVENOUS CONTINUOUS
Status: DISCONTINUED | OUTPATIENT
Start: 2020-02-04 | End: 2020-02-06

## 2020-02-04 RX ORDER — GLYCOPYRROLATE 0.2 MG/ML
INJECTION INTRAMUSCULAR; INTRAVENOUS AS NEEDED
Status: DISCONTINUED | OUTPATIENT
Start: 2020-02-04 | End: 2020-02-04 | Stop reason: SURG

## 2020-02-04 RX ORDER — LIDOCAINE HYDROCHLORIDE 10 MG/ML
INJECTION, SOLUTION EPIDURAL; INFILTRATION; INTRACAUDAL; PERINEURAL AS NEEDED
Status: DISCONTINUED | OUTPATIENT
Start: 2020-02-04 | End: 2020-02-04 | Stop reason: SURG

## 2020-02-04 RX ORDER — ONDANSETRON 2 MG/ML
4 INJECTION INTRAMUSCULAR; INTRAVENOUS ONCE AS NEEDED
Status: DISCONTINUED | OUTPATIENT
Start: 2020-02-04 | End: 2020-02-04 | Stop reason: HOSPADM

## 2020-02-04 RX ORDER — NALOXONE HYDROCHLORIDE 0.4 MG/ML
80 INJECTION, SOLUTION INTRAMUSCULAR; INTRAVENOUS; SUBCUTANEOUS AS NEEDED
Status: DISCONTINUED | OUTPATIENT
Start: 2020-02-04 | End: 2020-02-04 | Stop reason: HOSPADM

## 2020-02-04 RX ADMIN — LIDOCAINE HYDROCHLORIDE 50 MG: 10 INJECTION, SOLUTION EPIDURAL; INFILTRATION; INTRACAUDAL; PERINEURAL at 16:09:00

## 2020-02-04 RX ADMIN — GLYCOPYRROLATE 0.2 MG: 0.2 INJECTION INTRAMUSCULAR; INTRAVENOUS at 16:08:00

## 2020-02-04 RX ADMIN — SODIUM CHLORIDE: 9 INJECTION, SOLUTION INTRAVENOUS at 16:21:00

## 2020-02-04 NOTE — OPERATIVE REPORT
Esophagogastroduodenoscopy (EGD) Report    Krystian Friend     1940 Age 78year old   PCP Francesco Quintero DO Endoscopist Ken Cedillo MD     Date of procedure: 20    Procedure: EGD      Pre-operative diagnosis: dark stool, iron deficienc appeared normal. Retroflexion revealed a normal fundus and cardia. 3. Duodenum: The duodenal mucosa appeared normal in the 1st, 2nd, and 3rd portionof the duodenum.      There was only bile tinged mucosa without blood (fesh or old) throughout the visualiz

## 2020-02-04 NOTE — PROGRESS NOTES
[facility] HOSP - [facility]    Cardiology Progress Note    Shannon Ortez Patient Status:  Inpatient    1940 MRN [id number]   Location [facility] 3W/SW Attending Keara Newell MD   Hosp Day # 1 PCP Yamil Julian DO     Primary [facility] the LAD and diagonal 5 years ago in 2015  - Brilinta and aspirin and hold for acute GI bleed  - cont statin    Chronic HFpEF  - appeared dry on admission, given gentle IVF  -  lasix on hold         Acute on chronic kidney disease   - improved today  - cont 2/3/2020  ECG Report  Interpretation  --------------------------     Ekg 12-lead    Result Date: 2/3/2020  ECG Report  Interpretation  -------------------------- Normal sinus rhythm WITHIN NORMAL LIMITS When compared with ECG of 04/23/2015 22:30:02 ST cote

## 2020-02-04 NOTE — CM/SW NOTE
Received MDO for advanced directives. SW spoke w/ pt in his room. Pt verified his address and confirmed living w/ his wife. Pt reports have a single level home w/ a basement. Pt reports having 3 back stairs outside and 2 front stairs outside.  Pt also re

## 2020-02-04 NOTE — ANESTHESIA PREPROCEDURE EVALUATION
Anesthesia PreOp Note    HPI:     Arsenio Segundo is a 78year old male who presents for preoperative consultation requested by: Ovidio Saha MD    Date of Surgery: 2/3/2020 - 2/4/2020    Procedure(s):  ESOPHAGOGASTRODUODENOSCOPY (EGD)  Indication: sure what type   • HAND/FINGER SURGERY UNLISTED      Right index finger skin graft for injury   • LIPOMA REMOVAL  7/15/2016    Left upper back    • PT W/ CORONARY ARTERY STENT  2015       furosemide 40 MG Oral Tab, TAKE 1 TABLET BY MOUTH EVERY DAY, Disp: , 1356  Normal Saline Flush 0.9 % injection 3 mL, 3 mL, Intravenous, PRN, Nic Donaldson MD  0.9% NaCl infusion, , Intravenous, Continuous, Collin Sloan MD, Last Rate: 100 mL/hr at 02/04/20 1252  Heparin Sodium (Porcine) 5000 UNIT/ML injection 5,000 Un Onset   • Diabetes Paternal Aunt    • Glaucoma Neg      Social History    Socioeconomic History      Marital status:       Spouse name: Not on file      Number of children: 2      Years of education: Not on file      Highest education level: Not on Not Asked        Exercise: Not Asked        Bike Helmet: Not Asked        Seat Belt: Not Asked        Self-Exams: Not Asked        Grew up on a farm: Not Asked        History of tanning: Not Asked        Outdoor occupation: Not Asked        Reaction to loc controlled, CAD,     ECG reviewed  ROS comment: On Brelinta and baby ASA, last dose yesterday 2/3/20    Neuro/Psych - negative ROS     GI/Hepatic/Renal    (+) chronic renal disease (CKD, recently MD changed meds to try to improve, but patient was admitted

## 2020-02-04 NOTE — ANESTHESIA POSTPROCEDURE EVALUATION
Patient: Tabatha Burrell    Procedure Summary     Date:  02/04/20 Room / Location:  [address] [place] [address] ENDOSCOPY 01 / [address] [place] [address] ENDOSCOPY    Anesthesia Start:  8992 Anesthesia Stop:  4292    Procedure:  ESOPHAGOGASTRODUODENOSCOPY (EGD) (N/A ) Diagnosis:       Gastrointestinal

## 2020-02-04 NOTE — PROGRESS NOTES
[facility]    Progress Note    Audria Rinne Patient Status:  Inpatient    1940 MRN [id number]   Location [facility] 3W/SW Attending Yoesf Keller MD   Hosp Day # 1 PCP Tunde Kowalski DO       Subjective:    [facility] Daily   • Heparin Sodium (Porcine)  5,000 Units Subcutaneous 2 times per day   • ceFAZolin  1 g Intravenous 2 times per day   • Insulin Aspart Pen  1-7 Units Subcutaneous TID CC   • pantoprazole (PROTONIX) IV push  40 mg Intravenous Q12H       Current PRN ECG of 02/03/2020 10:55:53 no changes Electronically signed on 02/04/2020 at 09:50 by Damian Hernandez 12-lead    Result Date: 2/3/2020  ECG Report  Interpretation  -------------------------- Normal sinus rhythm WITHIN NORMAL LIMITS When compared with E

## 2020-02-04 NOTE — H&P
History & Physical Examination    Patient Name: Mariya Mondragon  MRN: [id number]  CSN: [id number]  YOB: 1940    Diagnosis: melena, dark stool, GI blood loss anemia    furosemide 40 MG Oral Tab, TAKE 1 TABLET BY MOUTH EVERY DAY, Disp: , Rfl: , Continuous  Heparin Sodium (Porcine) 5000 UNIT/ML injection 5,000 Units, 5,000 Units, Subcutaneous, 2 times per day  acetaminophen (TYLENOL) tab 650 mg, 650 mg, Oral, Q6H PRN  morphINE sulfate (PF) 2 MG/ML injection 1 mg, 1 mg, Intravenous, Q2H PRN    Or Social History    Tobacco Use      Smoking status: Former Smoker        Types: Cigarettes        Quit date: 6/3/2007        Years since quittin.6      Smokeless tobacco: Never Used    Alcohol use: Not Currently      Alcohol/week: 0.0 standard drin

## 2020-02-04 NOTE — PLAN OF CARE
Alert and oriented. Pt on IV antibiotics. Pain controlled thus far. Plan for EGD later today. Will continue to monitor.        Problem: Diabetes/Glucose Control  Goal: Glucose maintained within prescribed range  Description  INTERVENTIONS:  - Monitor Blood

## 2020-02-05 ENCOUNTER — ANESTHESIA (OUTPATIENT)
Dept: ENDOSCOPY | Facility: HOSPITAL | Age: 80
DRG: 378 | End: 2020-02-05
Payer: COMMERCIAL

## 2020-02-05 ENCOUNTER — ANESTHESIA EVENT (OUTPATIENT)
Dept: ENDOSCOPY | Facility: HOSPITAL | Age: 80
DRG: 378 | End: 2020-02-05
Payer: COMMERCIAL

## 2020-02-05 ENCOUNTER — TELEPHONE (OUTPATIENT)
Dept: FAMILY MEDICINE CLINIC | Facility: CLINIC | Age: 80
End: 2020-02-05

## 2020-02-05 ENCOUNTER — APPOINTMENT (OUTPATIENT)
Dept: MRI IMAGING | Facility: HOSPITAL | Age: 80
DRG: 378 | End: 2020-02-05
Attending: HOSPITALIST
Payer: COMMERCIAL

## 2020-02-05 LAB
ANION GAP SERPL CALC-SCNC: 6 MMOL/L (ref 0–18)
BUN BLD-MCNC: 26 MG/DL (ref 7–18)
BUN/CREAT SERPL: 19.1 (ref 10–20)
CALCIUM BLD-MCNC: 8.4 MG/DL (ref 8.5–10.1)
CHLORIDE SERPL-SCNC: 109 MMOL/L (ref 98–112)
CO2 SERPL-SCNC: 24 MMOL/L (ref 21–32)
CREAT BLD-MCNC: 1.36 MG/DL (ref 0.7–1.3)
DEPRECATED RDW RBC AUTO: 45 FL (ref 35.1–46.3)
ERYTHROCYTE [DISTWIDTH] IN BLOOD BY AUTOMATED COUNT: 14.4 % (ref 11–15)
GLUCOSE BLD-MCNC: 88 MG/DL (ref 70–99)
GLUCOSE BLDC GLUCOMTR-MCNC: 101 MG/DL (ref 70–99)
GLUCOSE BLDC GLUCOMTR-MCNC: 179 MG/DL (ref 70–99)
GLUCOSE BLDC GLUCOMTR-MCNC: 70 MG/DL (ref 70–99)
GLUCOSE BLDC GLUCOMTR-MCNC: 71 MG/DL (ref 70–99)
GLUCOSE BLDC GLUCOMTR-MCNC: 90 MG/DL (ref 70–99)
GLUCOSE BLDC GLUCOMTR-MCNC: 94 MG/DL (ref 70–99)
HCT VFR BLD AUTO: 22.4 % (ref 39–53)
HGB BLD-MCNC: 7.5 G/DL (ref 13–17.5)
MCH RBC QN AUTO: 29 PG (ref 26–34)
MCHC RBC AUTO-ENTMCNC: 33.5 G/DL (ref 31–37)
MCV RBC AUTO: 86.5 FL (ref 80–100)
OSMOLALITY SERPL CALC.SUM OF ELEC: 292 MOSM/KG (ref 275–295)
PLATELET # BLD AUTO: 247 10(3)UL (ref 150–450)
POTASSIUM SERPL-SCNC: 4.6 MMOL/L (ref 3.5–5.1)
RBC # BLD AUTO: 2.59 X10(6)UL (ref 3.8–5.8)
SODIUM SERPL-SCNC: 139 MMOL/L (ref 136–145)
WBC # BLD AUTO: 8.9 X10(3) UL (ref 4–11)

## 2020-02-05 PROCEDURE — 99232 SBSQ HOSP IP/OBS MODERATE 35: CPT | Performed by: NURSE PRACTITIONER

## 2020-02-05 PROCEDURE — 0DBK8ZZ EXCISION OF ASCENDING COLON, VIA NATURAL OR ARTIFICIAL OPENING ENDOSCOPIC: ICD-10-PCS | Performed by: INTERNAL MEDICINE

## 2020-02-05 PROCEDURE — 3E0H4GC INTRODUCTION OF OTHER THERAPEUTIC SUBSTANCE INTO LOWER GI, PERCUTANEOUS ENDOSCOPIC APPROACH: ICD-10-PCS | Performed by: INTERNAL MEDICINE

## 2020-02-05 PROCEDURE — 0DBN8ZZ EXCISION OF SIGMOID COLON, VIA NATURAL OR ARTIFICIAL OPENING ENDOSCOPIC: ICD-10-PCS | Performed by: INTERNAL MEDICINE

## 2020-02-05 PROCEDURE — 99233 SBSQ HOSP IP/OBS HIGH 50: CPT | Performed by: HOSPITALIST

## 2020-02-05 PROCEDURE — 73718 MRI LOWER EXTREMITY W/O DYE: CPT | Performed by: HOSPITALIST

## 2020-02-05 PROCEDURE — 45385 COLONOSCOPY W/LESION REMOVAL: CPT | Performed by: INTERNAL MEDICINE

## 2020-02-05 PROCEDURE — 45381 COLONOSCOPY SUBMUCOUS NJX: CPT | Performed by: INTERNAL MEDICINE

## 2020-02-05 RX ORDER — DEXTROSE AND SODIUM CHLORIDE 5; .45 G/100ML; G/100ML
INJECTION, SOLUTION INTRAVENOUS CONTINUOUS
Status: DISCONTINUED | OUTPATIENT
Start: 2020-02-05 | End: 2020-02-05

## 2020-02-05 RX ORDER — SODIUM CHLORIDE, SODIUM LACTATE, POTASSIUM CHLORIDE, CALCIUM CHLORIDE 600; 310; 30; 20 MG/100ML; MG/100ML; MG/100ML; MG/100ML
INJECTION, SOLUTION INTRAVENOUS CONTINUOUS PRN
Status: DISCONTINUED | OUTPATIENT
Start: 2020-02-05 | End: 2020-02-05 | Stop reason: SURG

## 2020-02-05 RX ORDER — CLONIDINE 0.1 MG/24H
1 PATCH, EXTENDED RELEASE TRANSDERMAL WEEKLY
Status: DISCONTINUED | OUTPATIENT
Start: 2020-02-05 | End: 2020-02-06

## 2020-02-05 RX ORDER — ATORVASTATIN CALCIUM 40 MG/1
80 TABLET, FILM COATED ORAL
Status: DISCONTINUED | OUTPATIENT
Start: 2020-02-05 | End: 2020-02-06

## 2020-02-05 RX ORDER — FUROSEMIDE 40 MG/1
40 TABLET ORAL
Status: DISCONTINUED | OUTPATIENT
Start: 2020-02-05 | End: 2020-02-06

## 2020-02-05 RX ORDER — TAMSULOSIN HYDROCHLORIDE 0.4 MG/1
0.4 CAPSULE ORAL DAILY
Status: DISCONTINUED | OUTPATIENT
Start: 2020-02-05 | End: 2020-02-06

## 2020-02-05 RX ORDER — AMLODIPINE BESYLATE 2.5 MG/1
2.5 TABLET ORAL DAILY
Status: DISCONTINUED | OUTPATIENT
Start: 2020-02-05 | End: 2020-02-06

## 2020-02-05 RX ORDER — FINASTERIDE 5 MG/1
5 TABLET, FILM COATED ORAL
Status: DISCONTINUED | OUTPATIENT
Start: 2020-02-05 | End: 2020-02-06

## 2020-02-05 RX ADMIN — SODIUM CHLORIDE, SODIUM LACTATE, POTASSIUM CHLORIDE, CALCIUM CHLORIDE: 600; 310; 30; 20 INJECTION, SOLUTION INTRAVENOUS at 14:41:00

## 2020-02-05 RX ADMIN — SODIUM CHLORIDE, SODIUM LACTATE, POTASSIUM CHLORIDE, CALCIUM CHLORIDE: 600; 310; 30; 20 INJECTION, SOLUTION INTRAVENOUS at 15:17:00

## 2020-02-05 NOTE — PROGRESS NOTES
[facility] HOSP - [facility]    Cardiology Progress Note    Cristina Mcbride Patient Status:  Inpatient    1940 MRN [id number]   Location [facility] 3W/SW Attending Alesha Benavidez MD   Hosp Day # 2 PCP Jacy Mcfarlane DO     Primary Cardiologis given gentle IVF  -  lasix on hold         Acute on chronic kidney disease   - improved today  - cont to trend     HTN  - stable  - clonidine patch in place; placed on Saturday 2/1 per patient  - cont metoprolol 25mg BID  - cont to hold valsartan, amlodipi relating to cellulitis. Diffuse subcutaneous edema and dorsum of the foot may also relate to underlying cellulitis. Mild marrow edema within the mid and distal 3rd phalanges without definite T1 marrow signal abnormality.   These findings are likely reacti

## 2020-02-05 NOTE — PROGRESS NOTES
[facility]    Progress Note    Bee Kapadia Patient Status:  Inpatient    1940 MRN [id number]   Location [facility] 3W/SW Attending Florencio Paniagua MD   Hosp Day # 2 PCP Benjamin Melo DO       Subjective:    [facility] (Porcine)  5,000 Units Subcutaneous 2 times per day   • ceFAZolin  1 g Intravenous 2 times per day   • Insulin Aspart Pen  1-7 Units Subcutaneous TID CC       Current PRN Inpatient Meds:      Normal Saline Flush, acetaminophen, morphINE sulfate **OR** morp signed on 02/04/2020 at 09:50 by Sharron Delacruz and Plan:     Possible UGI bleed  Melena  Nausea   - GI following  - Hb 7.7 today was 9.0 on admit and baseilne is 13.   - s/p EGD on 2/4 with no evidence of upper GI bleeding   - IV PPI BID.   -

## 2020-02-05 NOTE — ANESTHESIA PREPROCEDURE EVALUATION
Anesthesia PreOp Note    HPI:     Brianda Amezquita is a 78year old male who presents for preoperative consultation requested by: Guillaume Jain MD    Date of Surgery: 2/3/2020 - 2/5/2020    Procedure(s):  COLONOSCOPY  Indication: GI Nancy Chick STENT      Not sure what type   • ESOPHAGOGASTRODUODENOSCOPY (EGD) N/A 2/4/2020    Performed by Nadya Lin MD at [address] [place] [address] ENDOSCOPY   • HAND/FINGER SURGERY UNLISTED      Right index finger skin graft for injury   • LIPOMA REMOVAL  7/15/2016    Left upp Raquel Couch MD  metoprolol Tartrate (LOPRESSOR) tab 25 mg, 25 mg, Oral, 2x Daily(Beta Blocker), Collin Barnes NP, 25 mg at 02/05/20 0600  iron sucrose (VENOFER) IV Push 200 mg, 200 mg, Intravenous, Daily, Zo Medina MD, 200 mg at 02/05/20 0814  lactated flexpen 1-7 Units, 1-7 Units, Subcutaneous, TID CC, Quentin Jaquez MD    No current [facility]-ordered outpatient medications on file.       No Known Allergies    Family History   Problem Relation Age of Onset   • Diabetes Paternal Aunt    • Glaucoma Neg      S Exposure: Not Asked        Hobby Hazards: Not Asked        Sleep Concern: Not Asked        Stress Concern: Not Asked        Weight Concern: Not Asked        Special Diet: Not Asked        Back Care: Not Asked        Exercise: Not Asked        Bike Helmet: reviewed    Neuro/Psych      GI/Hepatic/Renal    (+) chronic renal disease CRI, bowel prep    Comments: Came in with SOB, fatigue and found to be anemic.   UGI study did not reveal anything so now presents for colonoscopy    Endo/Other    (+) diabetes melli

## 2020-02-05 NOTE — PLAN OF CARE
Problem: Diabetes/Glucose Control  Goal: Glucose maintained within prescribed range  Description  INTERVENTIONS:  - Monitor Blood Glucose as ordered  - Assess for signs and symptoms of hyperglycemia and hypoglycemia  - Administer ordered medications to m tolerated  - Evaluate effectiveness of GI medications  - Encourage mobilization and activity  - Obtain nutritional consult as needed  - Establish a toileting routine/schedule  - Consider collaborating with pharmacy to review patient's medication profile  O

## 2020-02-05 NOTE — OPERATIVE REPORT
COLONOSCOPY PROCEDURE REPORT     DATE OF PROCEDURE:  2/5/2020     PCP: Polly Islas DO     PREOPERATIVE DIAGNOSIS: Melena, hematochezia, acute blood loss anemia     POSTOPERATIVE DIAGNOSIS:  See impression. SURGEON:  Kendrick Bryan M.D.     Fran Prophet later showed no bleeding. · Medium sized internal hemorrhoids only on forward and retroflexed views anal canal and rectal vault. · No diverticulosis seen today. RECOMMENDATIONS:  · High fiber diet. · Follow-up above colon polyp pathology results.   ·

## 2020-02-05 NOTE — ANESTHESIA POSTPROCEDURE EVALUATION
Patient: Ashley Ramos    Procedure Summary     Date:  02/05/20 Room / Location:  [address] [place] [address] ENDOSCOPY 04 / [address] [place] [address] ENDOSCOPY    Anesthesia Start:  0281 Anesthesia Stop:  0306    Procedure:  COLONOSCOPY (N/A ) Diagnosis:  (COLON POLYPS, INTERNAL HEMORRHOIDS)    Rima Epps

## 2020-02-05 NOTE — TELEPHONE ENCOUNTER
Patient states he would like to go back to work on Monday 02/10/2020 and his , Sandie Lou, is requesting a call from doctor or nurse to clear patient. Ph. # [phone].

## 2020-02-05 NOTE — PROGRESS NOTES
Informed the RNJorge in endo that patient had a hypoglycemic episode @ approx. 12:10pm and was taken to endo for a colonoscopy around 1330. Repeat blood sugar was needed at 1430.

## 2020-02-06 VITALS
SYSTOLIC BLOOD PRESSURE: 136 MMHG | OXYGEN SATURATION: 97 % | DIASTOLIC BLOOD PRESSURE: 70 MMHG | RESPIRATION RATE: 20 BRPM | HEART RATE: 69 BPM | WEIGHT: 157.88 LBS | HEIGHT: 67.99 IN | TEMPERATURE: 98 F | BODY MASS INDEX: 23.93 KG/M2

## 2020-02-06 LAB
ABSOLUTE IMMATURE GRANULOCYTES (OFFPRE24): NORMAL
ANION GAP SERPL CALC-SCNC: 6 MMOL/L
ANION GAP SERPL CALC-SCNC: 6 MMOL/L (ref 0–18)
BASO+EOS+MONOS # BLD: NORMAL 10*3/UL
BASO+EOS+MONOS NFR BLD: NORMAL %
BASOPHILS # BLD AUTO: 0.03 X10(3) UL (ref 0–0.2)
BASOPHILS # BLD: NORMAL 10*3/UL
BASOPHILS NFR BLD AUTO: 0.4 %
BASOPHILS NFR BLD: NORMAL %
BUN BLD-MCNC: 19 MG/DL (ref 7–18)
BUN SERPL-MCNC: 19 MG/DL
BUN/CREAT SERPL: 15
BUN/CREAT SERPL: 15 (ref 10–20)
CALCIUM BLD-MCNC: 8.6 MG/DL (ref 8.5–10.1)
CALCIUM SERPL-MCNC: 8.6 MG/DL
CHLORIDE SERPL-SCNC: 109 MMOL/L
CHLORIDE SERPL-SCNC: 109 MMOL/L (ref 98–112)
CO2 SERPL-SCNC: 25 MMOL/L
CO2 SERPL-SCNC: 25 MMOL/L (ref 21–32)
CREAT BLD-MCNC: 1.27 MG/DL (ref 0.7–1.3)
CREAT SERPL-MCNC: 1.27 MG/DL
DEPRECATED RDW RBC AUTO: 46.7 FL (ref 35.1–46.3)
DIFFERENTIAL METHOD BLD: NORMAL
EOSINOPHIL # BLD AUTO: 0.37 X10(3) UL (ref 0–0.7)
EOSINOPHIL # BLD: NORMAL 10*3/UL
EOSINOPHIL NFR BLD AUTO: 4.7 %
EOSINOPHIL NFR BLD: NORMAL %
ERYTHROCYTE [DISTWIDTH] IN BLOOD BY AUTOMATED COUNT: 14.6 % (ref 11–15)
ERYTHROCYTE [DISTWIDTH] IN BLOOD: NORMAL %
GLUCOSE BLD-MCNC: 92 MG/DL (ref 70–99)
GLUCOSE BLDC GLUCOMTR-MCNC: 112 MG/DL (ref 70–99)
GLUCOSE BLDC GLUCOMTR-MCNC: 89 MG/DL (ref 70–99)
GLUCOSE SERPL-MCNC: 92 MG/DL
HCT VFR BLD AUTO: 24.2 % (ref 39–53)
HCT VFR BLD CALC: 24.2 %
HGB BLD-MCNC: 7.6 G/DL
HGB BLD-MCNC: 7.6 G/DL (ref 13–17.5)
IMM GRANULOCYTES # BLD AUTO: 0.06 X10(3) UL (ref 0–1)
IMM GRANULOCYTES NFR BLD: 0.8 %
IMMATURE GRANULOCYTES (OFFPRE25): NORMAL
LENGTH OF FAST TIME PATIENT: NORMAL H
LYMPHOCYTES # BLD AUTO: 1.35 X10(3) UL (ref 1–4)
LYMPHOCYTES # BLD: NORMAL 10*3/UL
LYMPHOCYTES NFR BLD AUTO: 17.3 %
LYMPHOCYTES NFR BLD: NORMAL %
MCH RBC QN AUTO: 27.6 PG (ref 26–34)
MCH RBC QN AUTO: NORMAL PG
MCHC RBC AUTO-ENTMCNC: 31.4 G/DL (ref 31–37)
MCHC RBC AUTO-ENTMCNC: NORMAL G/DL
MCV RBC AUTO: 88 FL (ref 80–100)
MCV RBC AUTO: NORMAL FL
MONOCYTES # BLD AUTO: 1.07 X10(3) UL (ref 0.1–1)
MONOCYTES # BLD: NORMAL 10*3/UL
MONOCYTES NFR BLD AUTO: 13.7 %
MONOCYTES NFR BLD: NORMAL %
MPV (OFFPRE2): NORMAL
NEUTROPHILS # BLD AUTO: 4.92 X10 (3) UL (ref 1.5–7.7)
NEUTROPHILS # BLD AUTO: 4.92 X10(3) UL (ref 1.5–7.7)
NEUTROPHILS # BLD: NORMAL 10*3/UL
NEUTROPHILS NFR BLD AUTO: 63.1 %
NEUTROPHILS NFR BLD: NORMAL %
NRBC BLD MANUAL-RTO: NORMAL %
OSMOLALITY SERPL CALC.SUM OF ELEC: 292 MOSM/KG (ref 275–295)
PLAT MORPH BLD: NORMAL
PLATELET # BLD AUTO: 241 10(3)UL (ref 150–450)
PLATELET # BLD: 241 10*3/UL
POTASSIUM SERPL-SCNC: 4.5 MMOL/L
POTASSIUM SERPL-SCNC: 4.5 MMOL/L (ref 3.5–5.1)
RBC # BLD AUTO: 2.75 X10(6)UL (ref 3.8–5.8)
RBC # BLD: 2.75 10*6/UL
RBC MORPH BLD: NORMAL
SODIUM SERPL-SCNC: 140 MMOL/L
SODIUM SERPL-SCNC: 140 MMOL/L (ref 136–145)
WBC # BLD AUTO: 7.8 X10(3) UL (ref 4–11)
WBC # BLD: 7.8 10*3/UL
WBC MORPH BLD: NORMAL

## 2020-02-06 PROCEDURE — 99232 SBSQ HOSP IP/OBS MODERATE 35: CPT | Performed by: NURSE PRACTITIONER

## 2020-02-06 PROCEDURE — 99239 HOSP IP/OBS DSCHRG MGMT >30: CPT | Performed by: HOSPITALIST

## 2020-02-06 RX ORDER — CEPHALEXIN 500 MG/1
500 CAPSULE ORAL 3 TIMES DAILY
Qty: 18 CAPSULE | Refills: 0 | Status: SHIPPED | OUTPATIENT
Start: 2020-02-06 | End: 2020-02-12

## 2020-02-06 RX ORDER — AMLODIPINE BESYLATE 5 MG/1
2.5 TABLET ORAL DAILY
Qty: 90 TABLET | Refills: 3 | Status: SHIPPED | OUTPATIENT
Start: 2020-02-06 | End: 2020-02-06

## 2020-02-06 RX ORDER — AMLODIPINE BESYLATE 5 MG/1
2.5 TABLET ORAL DAILY
Qty: 90 TABLET | Refills: 3 | Status: SHIPPED | OUTPATIENT
Start: 2020-02-06 | End: 2020-08-04

## 2020-02-06 NOTE — PLAN OF CARE
Alert and oriented. No c/o plain in the lower extremity. Pt on IV Antibiotics. Will continue to monitor.        Problem: Diabetes/Glucose Control  Goal: Glucose maintained within prescribed range  Description  INTERVENTIONS:  - Monitor Blood Glucose as orde ADULT  Goal: Minimal or absence of nausea and vomiting  Description  INTERVENTIONS:  - Maintain adequate hydration with IV or PO as ordered and tolerated  - Nasogastric tube to low intermittent suction as ordered  - Evaluate effectiveness of ordered antiem

## 2020-02-06 NOTE — TELEPHONE ENCOUNTER
I see him tomorrow. When I see him and if he is stable we can go ahead and fax a letter to his office stating that he can return back to work Monday. Make sure he has a fax number for his office when he comes in.

## 2020-02-06 NOTE — PROGRESS NOTES
[facility]OSP - [facility]    Cardiology Progress Note    Shai Mcintyre Patient Status:  Inpatient    1940 MRN [id number]   Location [facility] 3W/SW Attending Sophia Schwartz MD   Hosp Day # 3 PCP Collin Willis DO     Primary Cardiologis nonsteroidals.  Moderate anemia, which is the likely cause of his shortness of breath with exertion.   - Hgb 7.6 this AM - stable  - GI following   - EGD 2/4 with no acute findings   - colonoscopy 2/5 with no active bleeding. Polyps removed.    - per GI re foot may also relate to underlying cellulitis. Mild marrow edema within the mid and distal 3rd phalanges without definite T1 marrow signal abnormality. These findings are likely reactive with osteomyelitis thought to be less likely.     Dictated by (CST):

## 2020-02-06 NOTE — DISCHARGE SUMMARY
[facility] - [facility]    Discharge Summary    Milena Edgar Patient Status:  Inpatient    1940 MRN [id number]   Location [facility] 3W/SW Attending Ronel Ladd MD   [facility] Day # 3 PCP Sidney Varela DO     Date of Admission: 2/3/202 The patient is a 22-year-old  male who was sent to the emergency department for evaluation of generalized weakness, fatigue, and pain in his right foot. CBC showed white blood cell count of 13.1, hemoglobin of 9.0, which is around his baseline. Specialty:  Family Medicine  Contact information:  [address] [id number] [facility]  [phone]             Shoaib Ruiz MD In 2 weeks. Specialty:  GASTROENTEROLOGY  Why:  or earlier if needed  Contact information:  Ger Wolff (5 mg total) by mouth once daily.    Quantity:  90 tablet  Refills:  3     furosemide 40 MG Tabs  Commonly known as:  LASIX      TAKE 1 TABLET BY MOUTH EVERY DAY   Refills:  0     metoprolol Tartrate 25 MG Tabs  Commonly known as:  Keila Palomino MEDI

## 2020-02-07 ENCOUNTER — OFFICE VISIT (OUTPATIENT)
Dept: FAMILY MEDICINE CLINIC | Facility: CLINIC | Age: 80
End: 2020-02-07

## 2020-02-07 ENCOUNTER — PATIENT OUTREACH (OUTPATIENT)
Dept: CASE MANAGEMENT | Age: 80
End: 2020-02-07

## 2020-02-07 VITALS
BODY MASS INDEX: 24.64 KG/M2 | HEART RATE: 80 BPM | DIASTOLIC BLOOD PRESSURE: 71 MMHG | HEIGHT: 67 IN | WEIGHT: 157 LBS | TEMPERATURE: 97 F | SYSTOLIC BLOOD PRESSURE: 125 MMHG

## 2020-02-07 DIAGNOSIS — T39.395A GI BLEED DUE TO NSAIDS: Primary | ICD-10-CM

## 2020-02-07 DIAGNOSIS — Z02.9 ENCOUNTERS FOR ADMINISTRATIVE PURPOSE: ICD-10-CM

## 2020-02-07 DIAGNOSIS — L60.8 TOENAIL DEFORMITY: ICD-10-CM

## 2020-02-07 DIAGNOSIS — K92.2 GI BLEED DUE TO NSAIDS: Primary | ICD-10-CM

## 2020-02-07 DIAGNOSIS — L03.031 CELLULITIS OF THIRD TOE OF RIGHT FOOT: ICD-10-CM

## 2020-02-07 DIAGNOSIS — D64.9 ANEMIA, UNSPECIFIED TYPE: ICD-10-CM

## 2020-02-07 DIAGNOSIS — I25.10 CORONARY ARTERY DISEASE INVOLVING NATIVE CORONARY ARTERY OF NATIVE HEART WITHOUT ANGINA PECTORIS: ICD-10-CM

## 2020-02-07 PROCEDURE — 1111F DSCHRG MED/CURRENT MED MERGE: CPT | Performed by: FAMILY MEDICINE

## 2020-02-07 PROCEDURE — 99496 TRANSJ CARE MGMT HIGH F2F 7D: CPT | Performed by: FAMILY MEDICINE

## 2020-02-07 NOTE — PROGRESS NOTES
HPI:    Jose Coffman is a 78year old male here today for hospital follow up.    He was discharged from Inpatient hospital, [facility]  to Home   Admission Date: 2/3/20   Discharge Date: 2/6/20  Hospital Discharge Diagnoses (since 1/8/2020)     Dy Admitting Diagnosis: Dyspnea on exertion [R06.09]  Acute renal insufficiency [N28.9]  Cellulitis of right toe [S14.427]     Hospital Discharge Diagnoses: Dyspnea on exertion [R06.09]  Acute renal insufficiency [N28.9]  Cellulitis of right toe [L03.031]    History of Present Illness:  The patient is a 70-year-old  male who was sent to the emergency department for evaluation of generalized weakness, fatigue, and pain in his right foot. [address] showed white blood cell count of 13.1, hemoglobin of 9.0, wh   Julian Hendricks DO In 1 week. Specialty:  Family Medicine  Contact information:  [address]  [phone]                 Manoj Benavidez MD In 2 weeks.     Specialty:  GASTROENTEROLOGY  Why:  or earlier if neede   Quantity:  90 tablet  Refills:  2      finasteride 5 MG Tabs  Commonly known as:  PROSCAR       Take 1 tablet (5 mg total) by mouth once daily.    Quantity:  90 tablet  Refills:  3      furosemide 40 MG Tabs  Commonly known as:  LASIX       TAKE 1 TABLET Patient seen and examined independently by me. Chart reviewed. Discussed with APN.      Acute blood loss anemia  - resolved  - Hb stable      Doretha Gerardo MD      Electronically signed by Isabela De La Cruz MD at 2/6/2020  2:28 PM   ========================== He  has a past medical history of Benign prostatic hypertrophy, Colon polyps (2013), Coronary artery disease, Deep vein thrombosis ([facility] Diabetes ([facility] Diabetes type 2, controlled ([facility] Elevated PSA, High blood pressure, High cholesterol, Hypertension, Neurological: Patient is alert and oriented to person, place, and time. Skin: Skin is warm. Curved thickened nail of the 3rd right toe. Redness proximal to the cuticle of the 3rd toe, no tenderness. Psychiatry: Normal mood and affect.     Vitals reviewed Follow up if symptoms persist.  Take medicine (if given) as prescribed. Approach to treatment discussed and patient/family member understands and agrees to plan. Return in about 2 months (around 4/7/2020).           No orders of the defined types were

## 2020-02-10 ENCOUNTER — OFFICE VISIT (OUTPATIENT)
Dept: CARDIOLOGY | Age: 80
End: 2020-02-10

## 2020-02-10 VITALS
BODY MASS INDEX: 25.31 KG/M2 | HEIGHT: 68 IN | DIASTOLIC BLOOD PRESSURE: 56 MMHG | WEIGHT: 167 LBS | SYSTOLIC BLOOD PRESSURE: 128 MMHG | HEART RATE: 67 BPM | OXYGEN SATURATION: 98 %

## 2020-02-10 DIAGNOSIS — I10 ESSENTIAL HYPERTENSION: ICD-10-CM

## 2020-02-10 DIAGNOSIS — Z95.5 S/P DRUG ELUTING CORONARY STENT PLACEMENT: ICD-10-CM

## 2020-02-10 DIAGNOSIS — E78.00 PURE HYPERCHOLESTEROLEMIA: ICD-10-CM

## 2020-02-10 DIAGNOSIS — I25.10 CORONARY ARTERY DISEASE INVOLVING NATIVE HEART WITHOUT ANGINA PECTORIS, UNSPECIFIED VESSEL OR LESION TYPE: Primary | ICD-10-CM

## 2020-02-10 PROCEDURE — 99214 OFFICE O/P EST MOD 30 MIN: CPT | Performed by: INTERNAL MEDICINE

## 2020-02-10 PROCEDURE — 3074F SYST BP LT 130 MM HG: CPT | Performed by: INTERNAL MEDICINE

## 2020-02-10 PROCEDURE — 3078F DIAST BP <80 MM HG: CPT | Performed by: INTERNAL MEDICINE

## 2020-02-10 ASSESSMENT — PATIENT HEALTH QUESTIONNAIRE - PHQ9
SUM OF ALL RESPONSES TO PHQ9 QUESTIONS 1 AND 2: 0
2. FEELING DOWN, DEPRESSED OR HOPELESS: NOT AT ALL
SUM OF ALL RESPONSES TO PHQ9 QUESTIONS 1 AND 2: 0
1. LITTLE INTEREST OR PLEASURE IN DOING THINGS: NOT AT ALL

## 2020-02-10 NOTE — PROGRESS NOTES
Attempt made to reach the patient with no return call. Patient completed HFU on 2/7/2020. Closing encounter.

## 2020-02-26 ENCOUNTER — OFFICE VISIT (OUTPATIENT)
Dept: PODIATRY CLINIC | Facility: CLINIC | Age: 80
End: 2020-02-26

## 2020-02-26 DIAGNOSIS — B35.1 ONYCHOMYCOSIS: ICD-10-CM

## 2020-02-26 DIAGNOSIS — E11.9 CONTROLLED TYPE 2 DIABETES MELLITUS WITHOUT COMPLICATION, WITHOUT LONG-TERM CURRENT USE OF INSULIN (HCC): Primary | ICD-10-CM

## 2020-02-26 DIAGNOSIS — L60.0 ONYCHOCRYPTOSIS: ICD-10-CM

## 2020-02-26 DIAGNOSIS — E11.42 DIABETIC POLYNEUROPATHY ASSOCIATED WITH TYPE 2 DIABETES MELLITUS (HCC): ICD-10-CM

## 2020-02-26 PROCEDURE — 11721 DEBRIDE NAIL 6 OR MORE: CPT | Performed by: PODIATRIST

## 2020-02-28 NOTE — PROGRESS NOTES
Tabatha Burrell is a 78year old male. Patient presents with:  Toenail Care: Consult - has long toenails         HPI:   Patient presents to the clinic for routine nail trimming.   This is a longstanding diabetic had a previous history of cellulitis in 1 of • PT W/ CORONARY ARTERY STENT  2015      Family History   Problem Relation Age of Onset   • Diabetes Paternal Aunt    • Glaucoma Neg       Social History    Socioeconomic History      Marital status:       Spouse name: Not on file      Number of c complication, without long-term current use of insulin ([address]    Diabetic polyneuropathy associated with type 2 diabetes mellitus ([address]    Onychocryptosis    Onychomycosis        Plan:  Today educated the patient about the diabetic foot moisturizing but not

## 2020-03-14 ENCOUNTER — TELEPHONE (OUTPATIENT)
Dept: FAMILY MEDICINE CLINIC | Facility: CLINIC | Age: 80
End: 2020-03-14

## 2020-03-14 ENCOUNTER — OFFICE VISIT (OUTPATIENT)
Dept: FAMILY MEDICINE CLINIC | Facility: CLINIC | Age: 80
End: 2020-03-14

## 2020-03-14 ENCOUNTER — LAB ENCOUNTER (OUTPATIENT)
Dept: LAB | Age: 80
End: 2020-03-14
Attending: FAMILY MEDICINE
Payer: COMMERCIAL

## 2020-03-14 ENCOUNTER — HOSPITAL ENCOUNTER (OUTPATIENT)
Dept: GENERAL RADIOLOGY | Age: 80
Discharge: HOME | End: 2020-03-14
Attending: FAMILY MEDICINE
Payer: COMMERCIAL

## 2020-03-14 VITALS
WEIGHT: 169 LBS | BODY MASS INDEX: 26.53 KG/M2 | HEART RATE: 61 BPM | TEMPERATURE: 99 F | OXYGEN SATURATION: 98 % | SYSTOLIC BLOOD PRESSURE: 123 MMHG | DIASTOLIC BLOOD PRESSURE: 67 MMHG | HEIGHT: 67 IN

## 2020-03-14 DIAGNOSIS — T39.395A GI BLEED DUE TO NSAIDS: ICD-10-CM

## 2020-03-14 DIAGNOSIS — N18.30 ACUTE WORSENING OF STAGE 3 CHRONIC KIDNEY DISEASE (HCC): ICD-10-CM

## 2020-03-14 DIAGNOSIS — K92.2 GI BLEED DUE TO NSAIDS: ICD-10-CM

## 2020-03-14 DIAGNOSIS — I25.10 CORONARY ARTERY DISEASE INVOLVING NATIVE CORONARY ARTERY OF NATIVE HEART WITHOUT ANGINA PECTORIS: Primary | ICD-10-CM

## 2020-03-14 DIAGNOSIS — R09.89 BILATERAL RALES: ICD-10-CM

## 2020-03-14 DIAGNOSIS — E78.2 MIXED HYPERLIPIDEMIA: ICD-10-CM

## 2020-03-14 DIAGNOSIS — R60.0 BILATERAL LEG EDEMA: ICD-10-CM

## 2020-03-14 DIAGNOSIS — I10 ESSENTIAL HYPERTENSION, BENIGN: ICD-10-CM

## 2020-03-14 DIAGNOSIS — I10 ESSENTIAL HYPERTENSION: ICD-10-CM

## 2020-03-14 LAB
ANION GAP SERPL CALC-SCNC: 3 MMOL/L (ref 0–18)
BASOPHILS # BLD AUTO: 0.08 X10(3) UL (ref 0–0.2)
BASOPHILS NFR BLD AUTO: 1 %
BUN BLD-MCNC: 25 MG/DL (ref 7–18)
BUN/CREAT SERPL: 22.1 (ref 10–20)
CALCIUM BLD-MCNC: 9.1 MG/DL (ref 8.5–10.1)
CHLORIDE SERPL-SCNC: 106 MMOL/L (ref 98–112)
CO2 SERPL-SCNC: 30 MMOL/L (ref 21–32)
CREAT BLD-MCNC: 1.13 MG/DL (ref 0.7–1.3)
DEPRECATED RDW RBC AUTO: 47 FL (ref 35.1–46.3)
EOSINOPHIL # BLD AUTO: 0.39 X10(3) UL (ref 0–0.7)
EOSINOPHIL NFR BLD AUTO: 4.7 %
ERYTHROCYTE [DISTWIDTH] IN BLOOD BY AUTOMATED COUNT: 15 % (ref 11–15)
GLUCOSE BLD-MCNC: 96 MG/DL (ref 70–99)
HCT VFR BLD AUTO: 37.1 % (ref 39–53)
HGB BLD-MCNC: 11.5 G/DL (ref 13–17.5)
IMM GRANULOCYTES # BLD AUTO: 0.02 X10(3) UL (ref 0–1)
IMM GRANULOCYTES NFR BLD: 0.2 %
LYMPHOCYTES # BLD AUTO: 1.47 X10(3) UL (ref 1–4)
LYMPHOCYTES NFR BLD AUTO: 17.7 %
MCH RBC QN AUTO: 26.4 PG (ref 26–34)
MCHC RBC AUTO-ENTMCNC: 31 G/DL (ref 31–37)
MCV RBC AUTO: 85.3 FL (ref 80–100)
MONOCYTES # BLD AUTO: 0.83 X10(3) UL (ref 0.1–1)
MONOCYTES NFR BLD AUTO: 10 %
NEUTROPHILS # BLD AUTO: 5.52 X10 (3) UL (ref 1.5–7.7)
NEUTROPHILS # BLD AUTO: 5.52 X10(3) UL (ref 1.5–7.7)
NEUTROPHILS NFR BLD AUTO: 66.4 %
OSMOLALITY SERPL CALC.SUM OF ELEC: 292 MOSM/KG (ref 275–295)
PATIENT FASTING Y/N/NP: YES
PLATELET # BLD AUTO: 303 10(3)UL (ref 150–450)
POTASSIUM SERPL-SCNC: 4.1 MMOL/L (ref 3.5–5.1)
RBC # BLD AUTO: 4.35 X10(6)UL (ref 3.8–5.8)
SODIUM SERPL-SCNC: 139 MMOL/L (ref 136–145)
WBC # BLD AUTO: 8.3 X10(3) UL (ref 4–11)

## 2020-03-14 PROCEDURE — 99214 OFFICE O/P EST MOD 30 MIN: CPT | Performed by: FAMILY MEDICINE

## 2020-03-14 PROCEDURE — 80048 BASIC METABOLIC PNL TOTAL CA: CPT

## 2020-03-14 PROCEDURE — 85025 COMPLETE CBC W/AUTO DIFF WBC: CPT

## 2020-03-14 PROCEDURE — 36415 COLL VENOUS BLD VENIPUNCTURE: CPT

## 2020-03-14 PROCEDURE — 71046 X-RAY EXAM CHEST 2 VIEWS: CPT | Performed by: FAMILY MEDICINE

## 2020-03-14 RX ORDER — HYDROCHLOROTHIAZIDE 25 MG/1
25 TABLET ORAL DAILY
COMMUNITY
End: 2020-03-14

## 2020-03-14 NOTE — PATIENT INSTRUCTIONS
You are already on furosemide so I would stop the hydrochlorothiazide. Lets get a chest x-ray immediately.   Lets get a kidney test.

## 2020-03-14 NOTE — TELEPHONE ENCOUNTER
Lab paged provider at 2:57 p.m. Wanted to relay stat CBC and BMP. Reviewed results and called pt. Unable to speak with patient and left VM that his labs were normal. To follow-up with Dr. Ora Rodriguez on Monday. He can page the provider again if needed.

## 2020-03-14 NOTE — PROGRESS NOTES
Patient ID: Naila Hobbs is a 78year old male. HPI  Patient presents with: Follow - Up: last OV  Hypertension: medication follow up    Last seen by me on 2/7/20.     Pt has restarted his HCTZ 25 mg 2 weeks ago as he was having bilateral leg swellin 19 (H) 02/06/2020    CREATSERUM 1.27 02/06/2020    BUNCREA 15.0 02/06/2020    ANIONGAP 6 02/06/2020    GFRAA 62 02/06/2020    GFRNAA 53 (L) 02/06/2020    CA 8.6 02/06/2020     02/06/2020    K 4.5 02/06/2020     02/06/2020    CO2 25.0 02/06/2020 Results   Component Value Date    PSA 2.0 08/12/2017    TOTPSASCREEN 3.0 10/18/2014         Wt Readings from Last 6 Encounters:  03/14/20 : 169 lb (76.7 kg)  02/07/20 : 157 lb (71.2 kg)  02/05/20 : 157 lb 14.4 oz (71.6 kg)  02/03/20 : 160 lb 9.6 oz (72.8 k Outpatient Medications   Medication Sig Dispense Refill   • hydrochlorothiazide 25 MG Oral Tab Take 25 mg by mouth daily. • amLODIPine Besylate 5 MG Oral Tab Take 0.5 tablets (2.5 mg total) by mouth daily.  90 tablet 3   • furosemide 40 MG Oral Tab TAKE artery disease involving native coronary artery of native heart without angina pectoris  Has seen cardiology and states he has had no chest pain whatsoever. There is no shortness of breath.   GI bleed due to NSAIDs  Has had this in the past but avoids NSAI

## 2020-03-16 ENCOUNTER — TELEPHONE (OUTPATIENT)
Dept: FAMILY MEDICINE CLINIC | Facility: CLINIC | Age: 80
End: 2020-03-16

## 2020-03-16 NOTE — TELEPHONE ENCOUNTER
Patient confirms has review result notes.  Calling to schedule his 2 week follow up, patient prefers a Sat appt, please advise if able to use available Res24 slot for Sat 3/28    Viewed by Tabatha Burrell on 3/14/2020  9:14 PM   Written by CHARLES Perez

## 2020-03-16 NOTE — PROGRESS NOTES
[facility], [facility] - Gastroenterology                                                                                                  Clinic Progress Note    Patient pr by Cortney Carrillo MD at [address] [facility] ENDOSCOPY   • HAND/FINGER SURGERY UNLISTED      Right index finger skin graft for injury   • LIPOMA REMOVAL  7/15/2016    Left upper back    • PT W/ CORONARY ARTERY STENT  2015      Family Hx:   Family History   Problem Rel and without nodules  CV: RRR  Resp: non-labored breathing  Abd: soft, non-tender, non-distended  Ext: no lower extremity swelling  Neuro: Alert, Oriented X 3  Skin: no rashes, bruises  Psych: normal affect    Labs/Imaging:     Patient's labs and imaging we

## 2020-03-17 ENCOUNTER — OFFICE VISIT (OUTPATIENT)
Dept: GASTROENTEROLOGY | Facility: CLINIC | Age: 80
End: 2020-03-17

## 2020-03-17 VITALS
HEIGHT: 67 IN | DIASTOLIC BLOOD PRESSURE: 79 MMHG | HEART RATE: 67 BPM | SYSTOLIC BLOOD PRESSURE: 127 MMHG | BODY MASS INDEX: 26.47 KG/M2 | WEIGHT: 168.63 LBS

## 2020-03-17 DIAGNOSIS — Z09 FOLLOW UP: Primary | ICD-10-CM

## 2020-03-17 DIAGNOSIS — D50.9 IRON DEFICIENCY ANEMIA, UNSPECIFIED IRON DEFICIENCY ANEMIA TYPE: ICD-10-CM

## 2020-03-17 PROCEDURE — 99214 OFFICE O/P EST MOD 30 MIN: CPT | Performed by: INTERNAL MEDICINE

## 2020-03-17 NOTE — TELEPHONE ENCOUNTER
Left message to patient per [phone] [facility] patient can come in in a month is ok.

## 2020-03-17 NOTE — PATIENT INSTRUCTIONS
1. Check your blood count at the lab in 3 months (Around June)    2. Continue your current medications including aspirin    3.  Avoid NSAIDs    NSAIDs  Avoid taking NSAIDs (non-steroidal anti-inflammatory drugs)    These include: ibuprofen (Advil and Motrin

## 2020-03-26 ENCOUNTER — TELEPHONE (OUTPATIENT)
Dept: FAMILY MEDICINE CLINIC | Facility: CLINIC | Age: 80
End: 2020-03-26

## 2020-03-26 NOTE — TELEPHONE ENCOUNTER
Patient returned call. He states he is feeling better. Denies swelling in his legs, ankles, and/or feet  Denies shortness of breath with activity and rest.   He is taking furosemide as prescribed. Rescheduled appt to 4/11/20.    He understands to call

## 2020-03-26 NOTE — TELEPHONE ENCOUNTER
I am busy seeing patients but he is on my schedule for Saturday. Can you ask him how he is feeling. Are his legs still swollen? I am debating whether he should be seen on Saturday or not.   If he still has the leg swelling then he should definitely be se

## 2020-04-08 NOTE — TELEPHONE ENCOUNTER
appt for follow up Saturday 4/11 needs to be rescheduled. Contacted patient. Pt stating he is not having leg swelling anymore, meds that were prescribed in last office visit are working.    appt rescheduled for May 23 at 840am   Pt verbalized understandin

## 2020-06-08 DIAGNOSIS — I10 ESSENTIAL HYPERTENSION: ICD-10-CM

## 2020-06-08 DIAGNOSIS — R60.0 BILATERAL LEG EDEMA: ICD-10-CM

## 2020-06-08 RX ORDER — FUROSEMIDE 20 MG/1
TABLET ORAL
Qty: 90 TABLET | Refills: 0 | OUTPATIENT
Start: 2020-06-08

## 2020-06-20 DIAGNOSIS — R60.0 BILATERAL LEG EDEMA: ICD-10-CM

## 2020-06-20 DIAGNOSIS — I10 ESSENTIAL HYPERTENSION: ICD-10-CM

## 2020-06-24 RX ORDER — FUROSEMIDE 20 MG/1
TABLET ORAL
Qty: 90 TABLET | Refills: 0 | Status: SHIPPED | OUTPATIENT
Start: 2020-06-24 | End: 2020-09-15

## 2020-06-27 ENCOUNTER — OFFICE VISIT (OUTPATIENT)
Dept: FAMILY MEDICINE CLINIC | Facility: CLINIC | Age: 80
End: 2020-06-27

## 2020-06-27 ENCOUNTER — LAB ENCOUNTER (OUTPATIENT)
Dept: LAB | Age: 80
End: 2020-06-27
Attending: FAMILY MEDICINE
Payer: COMMERCIAL

## 2020-06-27 VITALS
HEART RATE: 63 BPM | SYSTOLIC BLOOD PRESSURE: 136 MMHG | DIASTOLIC BLOOD PRESSURE: 64 MMHG | WEIGHT: 166 LBS | TEMPERATURE: 98 F | BODY MASS INDEX: 26.06 KG/M2 | HEIGHT: 67 IN

## 2020-06-27 DIAGNOSIS — D64.9 ANEMIA, UNSPECIFIED TYPE: ICD-10-CM

## 2020-06-27 DIAGNOSIS — R97.20 ELEVATED PSA: ICD-10-CM

## 2020-06-27 DIAGNOSIS — E78.2 MIXED HYPERLIPIDEMIA: ICD-10-CM

## 2020-06-27 DIAGNOSIS — E11.9 DIET-CONTROLLED DIABETES MELLITUS (HCC): ICD-10-CM

## 2020-06-27 DIAGNOSIS — I25.10 CORONARY ARTERY DISEASE INVOLVING NATIVE CORONARY ARTERY OF NATIVE HEART WITHOUT ANGINA PECTORIS: ICD-10-CM

## 2020-06-27 DIAGNOSIS — D50.9 IRON DEFICIENCY ANEMIA, UNSPECIFIED IRON DEFICIENCY ANEMIA TYPE: ICD-10-CM

## 2020-06-27 DIAGNOSIS — I10 ESSENTIAL HYPERTENSION: Primary | ICD-10-CM

## 2020-06-27 DIAGNOSIS — R35.1 NOCTURIA: ICD-10-CM

## 2020-06-27 DIAGNOSIS — K92.2 GI BLEED DUE TO NSAIDS: ICD-10-CM

## 2020-06-27 DIAGNOSIS — I10 ESSENTIAL HYPERTENSION: ICD-10-CM

## 2020-06-27 DIAGNOSIS — T39.395A GI BLEED DUE TO NSAIDS: ICD-10-CM

## 2020-06-27 DIAGNOSIS — N40.1 BENIGN NON-NODULAR PROSTATIC HYPERPLASIA WITH LOWER URINARY TRACT SYMPTOMS: ICD-10-CM

## 2020-06-27 DIAGNOSIS — R60.0 EDEMA OF BOTH LOWER LEGS: ICD-10-CM

## 2020-06-27 LAB
ALT SERPL-CCNC: 32 UNITS/L
ANION GAP SERPL CALC-SCNC: 4 MMOL/L
AST SERPL-CCNC: 18 UNITS/L
BUN SERPL-MCNC: 17 MG/DL
BUN/CREAT SERPL: 16.8
CALCIUM SERPL-MCNC: 9.3 MG/DL
CHLORIDE SERPL-SCNC: 105 MMOL/L
CHOLEST SERPL-MCNC: 81 MG/DL
CO2 SERPL-SCNC: 33 MMOL/L
CREAT SERPL-MCNC: 1.01 MG/DL
ERYTHROCYTE [DISTWIDTH] IN BLOOD BY AUTOMATED COUNT: 52.7 %
GLUCOSE SERPL-MCNC: 95 MG/DL
HCT VFR BLD CALC: 39.7 %
HDLC SERPL-MCNC: 48 MG/DL
HGB BLD-MCNC: 12.4 G/DL
LDLC SERPL CALC-MCNC: 24 MG/DL
LENGTH OF FAST TIME PATIENT: YES H
LENGTH OF FAST TIME PATIENT: YES H
MCH RBC QN AUTO: 25.3 PG
MCHC RBC AUTO-ENTMCNC: 31.2 G/DL
MCV RBC AUTO: 80.9 FL
NONHDLC SERPL-MCNC: 33 MG/DL
PLATELET # BLD: 256 K/MCL
POTASSIUM SERPL-SCNC: 3.9 MMOL/L
RBC # BLD: 4.91 10*6/UL
SODIUM SERPL-SCNC: 142 MMOL/L
TRIGL SERPL-MCNC: 45 MG/DL
VLDLC SERPL CALC-MCNC: 9 MG/DL
WBC # BLD: 8.6 K/MCL

## 2020-06-27 PROCEDURE — 82570 ASSAY OF URINE CREATININE: CPT

## 2020-06-27 PROCEDURE — 84460 ALANINE AMINO (ALT) (SGPT): CPT

## 2020-06-27 PROCEDURE — 82043 UR ALBUMIN QUANTITATIVE: CPT

## 2020-06-27 PROCEDURE — 99214 OFFICE O/P EST MOD 30 MIN: CPT | Performed by: FAMILY MEDICINE

## 2020-06-27 PROCEDURE — 80061 LIPID PANEL: CPT

## 2020-06-27 PROCEDURE — 85025 COMPLETE CBC W/AUTO DIFF WBC: CPT

## 2020-06-27 PROCEDURE — 36415 COLL VENOUS BLD VENIPUNCTURE: CPT

## 2020-06-27 PROCEDURE — 84443 ASSAY THYROID STIM HORMONE: CPT

## 2020-06-27 PROCEDURE — 80048 BASIC METABOLIC PNL TOTAL CA: CPT

## 2020-06-27 PROCEDURE — 83036 HEMOGLOBIN GLYCOSYLATED A1C: CPT

## 2020-06-27 PROCEDURE — 84450 TRANSFERASE (AST) (SGOT): CPT

## 2020-06-27 RX ORDER — FINASTERIDE 5 MG/1
5 TABLET, FILM COATED ORAL
Qty: 90 TABLET | Refills: 2 | Status: SHIPPED | OUTPATIENT
Start: 2020-06-27 | End: 2021-07-18

## 2020-06-27 RX ORDER — ATORVASTATIN CALCIUM 80 MG/1
80 TABLET, FILM COATED ORAL
Qty: 90 TABLET | Refills: 2 | Status: SHIPPED | OUTPATIENT
Start: 2020-06-27 | End: 2021-04-02

## 2020-06-27 NOTE — PROGRESS NOTES
Patient ID: Tabatha Burrell is a 78year old male. HPI  Patient presents with:  Physical    Pt is  and does not smoke or drink alcohol. He works as a  at [employer] in [employer].  There are people protesting at the building al MOPERCENT 10.0 03/14/2020    EOPERCENT 4.7 03/14/2020    BAPERCENT 1.0 03/14/2020    NE 5.52 03/14/2020    LYMABS 1.47 03/14/2020    MOABSO 0.83 03/14/2020    EOABSO 0.39 03/14/2020    BAABSO 0.08 03/14/2020       Lab Results   Component Value Date    GLU 07/09/2019    CREUR 46.00 02/04/2020    CREAURINE 140.8 06/21/2016    MIALBURINE 34.2 (H) 06/21/2016    MCRRATIOUR 242.9 (H) 06/21/2016       Lab Results   Component Value Date    CHOLEST 79 07/09/2019    TRIG 43 07/09/2019    HDL 48 07/09/2019    LDL 22 0 for speech difficulty and weakness. Psychiatric/Behavioral: The patient is not nervous/anxious.           Past Medical History:   Diagnosis Date   • Benign prostatic hypertrophy    • Colon polyps 2013   • Coronary artery disease    • Deep vein thrombosis file    Relationships      Social connections:        Talks on phone: Not on file        Gets together: Not on file        Attends [employer] service: Not on file        Active member of club or organization: Not on file        Attends meetings of clubs or EXAM:   Physical Exam  Physical Exam   Constitutional: He appears well-developed and well-nourished. No distress. Head: Normocephalic. Eyes: Conjunctivae and EOM are normal. Pupils are equal, round, and reactive to light.    Neck: Normal range of motion Future  -     LIPID PANEL; Future  -     ASSAY, THYROID STIM HORMONE; Future  Labs ordered. Benign non-nodular prostatic hyperplasia with lower urinary tract symptoms  -     finasteride 5 MG Oral Tab; Take 1 tablet (5 mg total) by mouth once daily.   No b

## 2020-08-03 DIAGNOSIS — I10 ESSENTIAL HYPERTENSION, BENIGN: ICD-10-CM

## 2020-08-04 RX ORDER — AMLODIPINE BESYLATE 5 MG/1
TABLET ORAL
Qty: 90 TABLET | Refills: 3 | Status: SHIPPED | OUTPATIENT
Start: 2020-08-04 | End: 2021-08-16

## 2020-08-12 ENCOUNTER — TELEPHONE (OUTPATIENT)
Dept: FAMILY MEDICINE CLINIC | Facility: CLINIC | Age: 80
End: 2020-08-12

## 2020-08-12 DIAGNOSIS — L57.0 AK (ACTINIC KERATOSIS): Primary | ICD-10-CM

## 2020-08-12 NOTE — TELEPHONE ENCOUNTER
Dr. Venegas [facility], patient is requesting a referral to Dr. Rudy Burger. Referral has been pended, please advise.

## 2020-08-12 NOTE — TELEPHONE ENCOUNTER
Patient requesting referral for follow up appointment with Dermatologist Dr Radhika Newell.  Patient has appointment scheduled for 8/17

## 2020-08-13 ENCOUNTER — OFFICE VISIT (OUTPATIENT)
Dept: CARDIOLOGY | Age: 80
End: 2020-08-13

## 2020-08-13 VITALS
BODY MASS INDEX: 25.31 KG/M2 | OXYGEN SATURATION: 97 % | DIASTOLIC BLOOD PRESSURE: 52 MMHG | HEIGHT: 68 IN | WEIGHT: 167 LBS | SYSTOLIC BLOOD PRESSURE: 120 MMHG | HEART RATE: 60 BPM

## 2020-08-13 DIAGNOSIS — E78.00 PURE HYPERCHOLESTEROLEMIA: ICD-10-CM

## 2020-08-13 DIAGNOSIS — I10 ESSENTIAL HYPERTENSION: ICD-10-CM

## 2020-08-13 DIAGNOSIS — I25.10 CORONARY ARTERY DISEASE INVOLVING NATIVE HEART WITHOUT ANGINA PECTORIS, UNSPECIFIED VESSEL OR LESION TYPE: Primary | ICD-10-CM

## 2020-08-13 PROCEDURE — 3078F DIAST BP <80 MM HG: CPT | Performed by: INTERNAL MEDICINE

## 2020-08-13 PROCEDURE — 3074F SYST BP LT 130 MM HG: CPT | Performed by: INTERNAL MEDICINE

## 2020-08-13 PROCEDURE — 99214 OFFICE O/P EST MOD 30 MIN: CPT | Performed by: INTERNAL MEDICINE

## 2020-08-13 SDOH — HEALTH STABILITY: MENTAL HEALTH: HOW OFTEN DO YOU HAVE A DRINK CONTAINING ALCOHOL?: NEVER

## 2020-08-13 ASSESSMENT — PATIENT HEALTH QUESTIONNAIRE - PHQ9
CLINICAL INTERPRETATION OF PHQ2 SCORE: NO FURTHER SCREENING NEEDED
CLINICAL INTERPRETATION OF PHQ9 SCORE: NO FURTHER SCREENING NEEDED
2. FEELING DOWN, DEPRESSED OR HOPELESS: NOT AT ALL
1. LITTLE INTEREST OR PLEASURE IN DOING THINGS: NOT AT ALL
SUM OF ALL RESPONSES TO PHQ9 QUESTIONS 1 AND 2: 0
SUM OF ALL RESPONSES TO PHQ9 QUESTIONS 1 AND 2: 0

## 2020-08-17 ENCOUNTER — OFFICE VISIT (OUTPATIENT)
Dept: DERMATOLOGY CLINIC | Facility: CLINIC | Age: 80
End: 2020-08-17

## 2020-08-17 DIAGNOSIS — L81.4 LENTIGO: ICD-10-CM

## 2020-08-17 DIAGNOSIS — D23.30 BENIGN NEOPLASM OF SKIN OF FACE: ICD-10-CM

## 2020-08-17 DIAGNOSIS — D23.60 BENIGN NEOPLASM OF SKIN OF UPPER LIMB, INCLUDING SHOULDER, UNSPECIFIED LATERALITY: ICD-10-CM

## 2020-08-17 DIAGNOSIS — L56.5 DSAP (DISSEMINATED SUPERFICIAL ACTINIC POROKERATOSIS): ICD-10-CM

## 2020-08-17 DIAGNOSIS — D23.5 BENIGN NEOPLASM OF SKIN OF TRUNK, EXCEPT SCROTUM: ICD-10-CM

## 2020-08-17 DIAGNOSIS — D23.70 BENIGN NEOPLASM OF SKIN OF LOWER LIMB, INCLUDING HIP, UNSPECIFIED LATERALITY: ICD-10-CM

## 2020-08-17 DIAGNOSIS — L82.1 SEBORRHEIC KERATOSES: Primary | ICD-10-CM

## 2020-08-17 DIAGNOSIS — D23.4 BENIGN NEOPLASM OF SCALP AND SKIN OF NECK: ICD-10-CM

## 2020-08-17 PROCEDURE — 99213 OFFICE O/P EST LOW 20 MIN: CPT | Performed by: DERMATOLOGY

## 2020-08-23 NOTE — PROGRESS NOTES
Gio Guzman is a 78year old male. HPI:     CC:  Patient presents with:  Full Skin Exam: LOV 8/12/19. pt presenting today with full body skin check. Denies family and personal HX of skin cancer. Allergies:  Patient has no known allergies.     HI MG Oral Tab TAKE 1 TABLET BY MOUTH EVERY DAY WITH DINNER 90 tablet 0   • furosemide 40 MG Oral Tab Take 1 tablet (40 mg total) by mouth daily. 90 tablet 1   • tamsulosin (FLOMAX) cap      • aspirin 81 MG Oral Tab EC Take 81 mg by mouth.  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 Lots of sun in the past.  More careful with sun exposure currently no recent travel  Patient presents with concerns above. Patient has been in their usual state of health.   History, medications, allergies reviewed as Remarkable for:  Generalized actinic damage with multiple areas of lentigines ephelides. No suspicious lesions currently    Multiple tan-brown scaly waxy brownish like scaling patches consistent with disseminated superficial actinic porokeratosis.   Giulia Lindquist

## 2020-08-26 ENCOUNTER — TELEPHONE (OUTPATIENT)
Dept: SURGERY | Facility: CLINIC | Age: 80
End: 2020-08-26

## 2020-08-26 DIAGNOSIS — R35.1 NOCTURIA: Primary | ICD-10-CM

## 2020-08-26 NOTE — TELEPHONE ENCOUNTER
Per pt has a follow up on 10/19/20 and is wantting to know if will need lab work, there is nothing on file.  Please advise

## 2020-09-14 DIAGNOSIS — R60.0 BILATERAL LEG EDEMA: ICD-10-CM

## 2020-09-14 DIAGNOSIS — I10 ESSENTIAL HYPERTENSION: ICD-10-CM

## 2020-09-14 RX ORDER — FUROSEMIDE 40 MG/1
TABLET ORAL
Qty: 90 TABLET | Refills: 1 | Status: SHIPPED | OUTPATIENT
Start: 2020-09-14 | End: 2021-03-06

## 2020-09-15 DIAGNOSIS — R60.0 BILATERAL LEG EDEMA: ICD-10-CM

## 2020-09-15 DIAGNOSIS — I10 ESSENTIAL HYPERTENSION: ICD-10-CM

## 2020-09-15 RX ORDER — FUROSEMIDE 20 MG/1
TABLET ORAL
Qty: 90 TABLET | Refills: 0 | Status: SHIPPED | OUTPATIENT
Start: 2020-09-15 | End: 2020-12-17

## 2020-09-21 RX ORDER — TAMSULOSIN HYDROCHLORIDE 0.4 MG/1
CAPSULE ORAL
Qty: 90 CAPSULE | Refills: 3 | Status: SHIPPED | OUTPATIENT
Start: 2020-09-21 | End: 2021-09-07

## 2020-09-21 NOTE — TELEPHONE ENCOUNTER
Pt LOV with Dr. Ant Koenig 10/21/19 pt pharmacy requesting refill on tamsulosin if you agree please review and sign med, I copied and pasted part of PVK last note below. Pt has cyndie 10/19/2020    1. Continue tamsulosin 0.4 mg daily     2.   Continue finasteride 5

## 2020-09-26 ENCOUNTER — IMMUNIZATION (OUTPATIENT)
Dept: FAMILY MEDICINE CLINIC | Facility: CLINIC | Age: 80
End: 2020-09-26

## 2020-09-26 DIAGNOSIS — Z23 NEED FOR VACCINATION: ICD-10-CM

## 2020-09-26 PROCEDURE — 90662 IIV NO PRSV INCREASED AG IM: CPT | Performed by: NURSE PRACTITIONER

## 2020-09-26 PROCEDURE — 90471 IMMUNIZATION ADMIN: CPT | Performed by: NURSE PRACTITIONER

## 2020-09-29 ENCOUNTER — TELEPHONE (OUTPATIENT)
Dept: FAMILY MEDICINE CLINIC | Facility: CLINIC | Age: 80
End: 2020-09-29

## 2020-09-29 DIAGNOSIS — Z01.00 DIABETIC EYE EXAM (HCC): ICD-10-CM

## 2020-09-29 DIAGNOSIS — E11.9 DIABETIC EYE EXAM (HCC): ICD-10-CM

## 2020-09-29 DIAGNOSIS — E11.29 CONTROLLED TYPE 2 DIABETES MELLITUS WITH MICROALBUMINURIA, WITHOUT LONG-TERM CURRENT USE OF INSULIN: Primary | ICD-10-CM

## 2020-09-29 DIAGNOSIS — R80.9 CONTROLLED TYPE 2 DIABETES MELLITUS WITH MICROALBUMINURIA, WITHOUT LONG-TERM CURRENT USE OF INSULIN: Primary | ICD-10-CM

## 2020-10-19 ENCOUNTER — OFFICE VISIT (OUTPATIENT)
Dept: SURGERY | Facility: CLINIC | Age: 80
End: 2020-10-19

## 2020-10-19 VITALS
DIASTOLIC BLOOD PRESSURE: 74 MMHG | WEIGHT: 166 LBS | SYSTOLIC BLOOD PRESSURE: 143 MMHG | BODY MASS INDEX: 26 KG/M2 | HEART RATE: 63 BPM

## 2020-10-19 DIAGNOSIS — Z79.82 LONG TERM CURRENT USE OF ASPIRIN: ICD-10-CM

## 2020-10-19 DIAGNOSIS — N40.1 BENIGN PROSTATIC HYPERPLASIA WITH INCOMPLETE BLADDER EMPTYING: Primary | ICD-10-CM

## 2020-10-19 DIAGNOSIS — R39.14 BENIGN PROSTATIC HYPERPLASIA WITH INCOMPLETE BLADDER EMPTYING: Primary | ICD-10-CM

## 2020-10-19 DIAGNOSIS — N39.41 URGE INCONTINENCE: ICD-10-CM

## 2020-10-19 DIAGNOSIS — R35.1 NOCTURIA: ICD-10-CM

## 2020-10-19 DIAGNOSIS — N52.01 ERECTILE DYSFUNCTION DUE TO ARTERIAL INSUFFICIENCY: ICD-10-CM

## 2020-10-19 DIAGNOSIS — Z87.898 HISTORY OF ELEVATED PSA: ICD-10-CM

## 2020-10-19 PROCEDURE — 3078F DIAST BP <80 MM HG: CPT | Performed by: UROLOGY

## 2020-10-19 PROCEDURE — 99214 OFFICE O/P EST MOD 30 MIN: CPT | Performed by: UROLOGY

## 2020-10-19 PROCEDURE — 3077F SYST BP >= 140 MM HG: CPT | Performed by: UROLOGY

## 2020-10-19 NOTE — PROGRESS NOTES
HPI:    Patient ID: Creig Canavan is a 78year old male. HPI     BPH  Chronic. Patient is currently taking finasteride 5 mg daily. He presently denies any associated pelvic pain or discomfort. He feels this is stable.      ED   Chronic.  He is not curr mild and stable as his PSA = <10. Aspirin therapy   Patient is currently taking aspirin 81 mg daily.  He feels this is stable.           Review of previous records:  4  negative prostate biopsies in past, namely in 2003 ,  July 2006  and  March 6 2008t Gastrointestinal: Negative for abdominal pain and constipation. Genitourinary: Negative for dysuria, flank pain and hematuria.         Positive for urge incontinence, sensation of not emptying bladder, urinary frequency less than 2 hours, intermittent s HAND/FINGER SURGERY UNLISTED      Right index finger skin graft for injury   • LIPOMA REMOVAL  7/15/2016    Left upper back    • PT W/ CORONARY ARTERY STENT  2015      Family History   Problem Relation Age of Onset   • Diabetes Paternal Aunt    • Glaucoma Finasteride)          ASSESSMENT/PLAN:   Benign prostatic hyperplasia with incomplete bladder emptying  (primary encounter diagnosis)  Erectile dysfunction due to arterial insufficiency  Urge incontinence  Nocturia  History of elevated psa  Long term curre visist with me, patient decided to stop PSA testing.  Patient presently denies any associated symptoms to suggest prostate cancer such as unintentional weight loss, loss of appetite, or new/worsening bone pain as well as symptoms to suggest prostatitis such Shaye Caro MD.   Electronically Signed: Shen Page, 10/19/2020, 9:13 AM.     Doyle Hagan MD,  personally performed the services described in this documentation.  All medical record entries made by the scribe were at my direction and in my pr

## 2020-10-19 NOTE — PATIENT INSTRUCTIONS
Saadia Dempsey M.D.    1.   Continue tamsulosin 0.4 mg daily     2. Continue finasteride 5 mg daily     3. Return visit in 1 year. Urinalysis urine test 1--10 days before visit with me.     4.    The heart healthy
no

## 2020-12-17 DIAGNOSIS — R60.0 BILATERAL LEG EDEMA: ICD-10-CM

## 2020-12-17 DIAGNOSIS — I10 ESSENTIAL HYPERTENSION: ICD-10-CM

## 2020-12-17 RX ORDER — FUROSEMIDE 20 MG/1
TABLET ORAL
Qty: 90 TABLET | Refills: 0 | Status: SHIPPED | OUTPATIENT
Start: 2020-12-17 | End: 2021-03-15

## 2021-02-01 DIAGNOSIS — Z23 NEED FOR VACCINATION: ICD-10-CM

## 2021-03-05 DIAGNOSIS — R60.0 BILATERAL LEG EDEMA: ICD-10-CM

## 2021-03-05 DIAGNOSIS — I10 ESSENTIAL HYPERTENSION: ICD-10-CM

## 2021-03-06 RX ORDER — FUROSEMIDE 40 MG/1
TABLET ORAL
Qty: 90 TABLET | Refills: 0 | Status: SHIPPED | OUTPATIENT
Start: 2021-03-06 | End: 2021-04-22

## 2021-03-07 NOTE — TELEPHONE ENCOUNTER
Refilled times 1 but needs appointment before the next prescription will be filled. Please call patient and set up an office visit as overdue. For his diet-controlled diabetes.

## 2021-03-15 ENCOUNTER — OFFICE VISIT (OUTPATIENT)
Dept: FAMILY MEDICINE CLINIC | Facility: CLINIC | Age: 81
End: 2021-03-15

## 2021-03-15 VITALS
HEIGHT: 67 IN | HEART RATE: 79 BPM | TEMPERATURE: 98 F | WEIGHT: 173 LBS | SYSTOLIC BLOOD PRESSURE: 118 MMHG | DIASTOLIC BLOOD PRESSURE: 67 MMHG | BODY MASS INDEX: 27.15 KG/M2

## 2021-03-15 DIAGNOSIS — R60.0 EDEMA OF BOTH LOWER LEGS: ICD-10-CM

## 2021-03-15 DIAGNOSIS — R60.0 BILATERAL LEG EDEMA: ICD-10-CM

## 2021-03-15 DIAGNOSIS — E11.29 CONTROLLED TYPE 2 DIABETES MELLITUS WITH MICROALBUMINURIA, WITHOUT LONG-TERM CURRENT USE OF INSULIN (HCC): ICD-10-CM

## 2021-03-15 DIAGNOSIS — L98.9 SKIN LESION OF FACE: ICD-10-CM

## 2021-03-15 DIAGNOSIS — I10 ESSENTIAL HYPERTENSION: Primary | ICD-10-CM

## 2021-03-15 DIAGNOSIS — R80.9 CONTROLLED TYPE 2 DIABETES MELLITUS WITH MICROALBUMINURIA, WITHOUT LONG-TERM CURRENT USE OF INSULIN (HCC): ICD-10-CM

## 2021-03-15 PROCEDURE — 3078F DIAST BP <80 MM HG: CPT | Performed by: FAMILY MEDICINE

## 2021-03-15 PROCEDURE — 99214 OFFICE O/P EST MOD 30 MIN: CPT | Performed by: FAMILY MEDICINE

## 2021-03-15 PROCEDURE — 3074F SYST BP LT 130 MM HG: CPT | Performed by: FAMILY MEDICINE

## 2021-03-15 PROCEDURE — 3008F BODY MASS INDEX DOCD: CPT | Performed by: FAMILY MEDICINE

## 2021-03-15 RX ORDER — FUROSEMIDE 20 MG/1
20 TABLET ORAL
Qty: 90 TABLET | Refills: 0 | COMMUNITY
Start: 2021-03-15 | End: 2021-04-01

## 2021-03-15 NOTE — PROGRESS NOTES
Patient ID: Amy Lindquist is a [de-identified]year old male. HPI  Patient presents with: Follow - Up: Blood pressure   Referral: Mole on forehead    Last seen by me on 6/27/2020. Pt c/o a mole on the forehead which is increasing in size.  I am providing the p ESOPHAGOGASTRODUODENOSCOPY (EGD) N/A 2/4/2020    Performed by Nazanin Curran MD at [address] [place] [address] ENDOSCOPY   • HAND/FINGER SURGERY UNLISTED      Right index finger skin graft for injury   • LIPOMA REMOVAL  7/15/2016    Left upper back    • PT W/ CORONARY ARTERY and affect. Lower legs: trace pre-tibial edema    Vitals reviewed. Assessment/Plan:      Diagnoses and all orders for this visit:    Essential hypertension  -     BASIC METABOLIC PANEL (8);  Future  -     CBC WITH DIFFERENTIAL WITH PLATELET; Futu the record reflects my personal performance and is accurate and complete.   Laura Izquierdo DO, 3/15/2021, 6:01 PM

## 2021-03-16 ENCOUNTER — LAB ENCOUNTER (OUTPATIENT)
Dept: LAB | Age: 81
End: 2021-03-16
Attending: FAMILY MEDICINE
Payer: COMMERCIAL

## 2021-03-16 DIAGNOSIS — I10 ESSENTIAL HYPERTENSION: ICD-10-CM

## 2021-03-16 DIAGNOSIS — R60.0 EDEMA OF BOTH LOWER LEGS: ICD-10-CM

## 2021-03-16 DIAGNOSIS — R80.9 CONTROLLED TYPE 2 DIABETES MELLITUS WITH MICROALBUMINURIA, WITHOUT LONG-TERM CURRENT USE OF INSULIN (HCC): ICD-10-CM

## 2021-03-16 DIAGNOSIS — E11.29 CONTROLLED TYPE 2 DIABETES MELLITUS WITH MICROALBUMINURIA, WITHOUT LONG-TERM CURRENT USE OF INSULIN (HCC): ICD-10-CM

## 2021-03-16 DIAGNOSIS — I25.10 CORONARY ARTERY DISEASE INVOLVING NATIVE CORONARY ARTERY OF NATIVE HEART WITHOUT ANGINA PECTORIS: ICD-10-CM

## 2021-03-16 LAB
ANION GAP SERPL CALC-SCNC: 6 MMOL/L (ref 0–18)
BASOPHILS # BLD AUTO: 0.06 X10(3) UL (ref 0–0.2)
BASOPHILS NFR BLD AUTO: 0.6 %
BUN BLD-MCNC: 19 MG/DL (ref 7–18)
BUN/CREAT SERPL: 16.7 (ref 10–20)
CALCIUM BLD-MCNC: 9.2 MG/DL (ref 8.5–10.1)
CHLORIDE SERPL-SCNC: 103 MMOL/L (ref 98–112)
CO2 SERPL-SCNC: 32 MMOL/L (ref 21–32)
CREAT BLD-MCNC: 1.14 MG/DL
DEPRECATED RDW RBC AUTO: 46.9 FL (ref 35.1–46.3)
EOSINOPHIL # BLD AUTO: 0.39 X10(3) UL (ref 0–0.7)
EOSINOPHIL NFR BLD AUTO: 4 %
ERYTHROCYTE [DISTWIDTH] IN BLOOD BY AUTOMATED COUNT: 15 % (ref 11–15)
EST. AVERAGE GLUCOSE BLD GHB EST-MCNC: 143 MG/DL (ref 68–126)
GLUCOSE BLD-MCNC: 99 MG/DL (ref 70–99)
HBA1C MFR BLD HPLC: 6.6 % (ref ?–5.7)
HCT VFR BLD AUTO: 42.1 %
HGB BLD-MCNC: 13 G/DL
IMM GRANULOCYTES # BLD AUTO: 0.02 X10(3) UL (ref 0–1)
IMM GRANULOCYTES NFR BLD: 0.2 %
LYMPHOCYTES # BLD AUTO: 1.59 X10(3) UL (ref 1–4)
LYMPHOCYTES NFR BLD AUTO: 16.4 %
MCH RBC QN AUTO: 26.4 PG (ref 26–34)
MCHC RBC AUTO-ENTMCNC: 30.9 G/DL (ref 31–37)
MCV RBC AUTO: 85.6 FL
MONOCYTES # BLD AUTO: 0.92 X10(3) UL (ref 0.1–1)
MONOCYTES NFR BLD AUTO: 9.5 %
NEUTROPHILS # BLD AUTO: 6.72 X10 (3) UL (ref 1.5–7.7)
NEUTROPHILS # BLD AUTO: 6.72 X10(3) UL (ref 1.5–7.7)
NEUTROPHILS NFR BLD AUTO: 69.3 %
OSMOLALITY SERPL CALC.SUM OF ELEC: 294 MOSM/KG (ref 275–295)
PATIENT FASTING Y/N/NP: YES
PLATELET # BLD AUTO: 234 10(3)UL (ref 150–450)
POTASSIUM SERPL-SCNC: 4.6 MMOL/L (ref 3.5–5.1)
RBC # BLD AUTO: 4.92 X10(6)UL
SODIUM SERPL-SCNC: 141 MMOL/L (ref 136–145)
WBC # BLD AUTO: 9.7 X10(3) UL (ref 4–11)

## 2021-03-16 PROCEDURE — 80048 BASIC METABOLIC PNL TOTAL CA: CPT

## 2021-03-16 PROCEDURE — 85025 COMPLETE CBC W/AUTO DIFF WBC: CPT

## 2021-03-16 PROCEDURE — 36415 COLL VENOUS BLD VENIPUNCTURE: CPT

## 2021-03-16 PROCEDURE — 83036 HEMOGLOBIN GLYCOSYLATED A1C: CPT

## 2021-03-30 DIAGNOSIS — I10 ESSENTIAL HYPERTENSION: ICD-10-CM

## 2021-03-30 DIAGNOSIS — R60.0 BILATERAL LEG EDEMA: ICD-10-CM

## 2021-04-01 DIAGNOSIS — E78.2 MIXED HYPERLIPIDEMIA: ICD-10-CM

## 2021-04-01 RX ORDER — FUROSEMIDE 20 MG/1
TABLET ORAL
Qty: 90 TABLET | Refills: 0 | Status: SHIPPED | OUTPATIENT
Start: 2021-04-01 | End: 2021-04-22

## 2021-04-02 RX ORDER — ATORVASTATIN CALCIUM 80 MG/1
TABLET, FILM COATED ORAL
Qty: 90 TABLET | Refills: 2 | Status: SHIPPED | OUTPATIENT
Start: 2021-04-02 | End: 2021-08-16

## 2021-04-21 DIAGNOSIS — R60.0 BILATERAL LEG EDEMA: ICD-10-CM

## 2021-04-21 DIAGNOSIS — I10 ESSENTIAL HYPERTENSION: ICD-10-CM

## 2021-04-22 RX ORDER — FUROSEMIDE 40 MG/1
TABLET ORAL
Qty: 90 TABLET | Refills: 0 | Status: SHIPPED | OUTPATIENT
Start: 2021-04-22 | End: 2021-08-04

## 2021-04-22 RX ORDER — FUROSEMIDE 20 MG/1
TABLET ORAL
Qty: 90 TABLET | Refills: 0 | Status: SHIPPED | OUTPATIENT
Start: 2021-04-22 | End: 2021-08-04

## 2021-05-12 ENCOUNTER — OFFICE VISIT (OUTPATIENT)
Dept: DERMATOLOGY CLINIC | Facility: CLINIC | Age: 81
End: 2021-05-12

## 2021-05-12 VITALS — WEIGHT: 168 LBS | BODY MASS INDEX: 26.37 KG/M2 | HEIGHT: 67 IN

## 2021-05-12 DIAGNOSIS — D48.5 NEOPLASM OF UNCERTAIN BEHAVIOR OF SKIN: Primary | ICD-10-CM

## 2021-05-12 DIAGNOSIS — D23.60 BENIGN NEOPLASM OF SKIN OF UPPER LIMB, INCLUDING SHOULDER, UNSPECIFIED LATERALITY: ICD-10-CM

## 2021-05-12 DIAGNOSIS — L82.1 SEBORRHEIC KERATOSES: ICD-10-CM

## 2021-05-12 DIAGNOSIS — D23.30 BENIGN NEOPLASM OF SKIN OF FACE: ICD-10-CM

## 2021-05-12 DIAGNOSIS — L81.4 LENTIGO: ICD-10-CM

## 2021-05-12 DIAGNOSIS — L56.5 DSAP (DISSEMINATED SUPERFICIAL ACTINIC POROKERATOSIS): ICD-10-CM

## 2021-05-12 DIAGNOSIS — D23.4 BENIGN NEOPLASM OF SCALP AND SKIN OF NECK: ICD-10-CM

## 2021-05-12 PROCEDURE — 11102 TANGNTL BX SKIN SINGLE LES: CPT | Performed by: DERMATOLOGY

## 2021-05-12 PROCEDURE — 99213 OFFICE O/P EST LOW 20 MIN: CPT | Performed by: DERMATOLOGY

## 2021-05-12 PROCEDURE — 3008F BODY MASS INDEX DOCD: CPT | Performed by: DERMATOLOGY

## 2021-05-12 PROCEDURE — 88305 TISSUE EXAM BY PATHOLOGIST: CPT | Performed by: DERMATOLOGY

## 2021-05-15 NOTE — PROGRESS NOTES
The pathology report from last visit showed left medial brow  Seborrheic keratosis, irritated . Please log in test results, send biopsy results letter. Pt to rtc 1 year or prn.

## 2021-05-23 NOTE — PROGRESS NOTES
Operative Report                     Shave/  Tangential biopsy     Clinical diagnosis:    Size of lesion:    Location:  : pt with enlarging lesion  Spec 1 Description >>>>>: left media l brow  Spec 1 Comment: r/o SCC verrucous keratotic tan brown pap

## 2021-05-23 NOTE — PROGRESS NOTES
Angle Carlson is a [de-identified]year old male.   HPI:     CC:  Patient presents with:  Lesion: LOV on 8/17/21 patient has a  several lesions on his center forehead,mild itch ,occ bleed and scabbing  x 6 months ,personal HX of SK's ,denies personal HX of SC TAKE 1 CAPSULE (0.4 MG TOTAL) BY MOUTH DAILY.  TAKE 1/2 HOUR FOLLOWING THE SAME MEAL EACH DAY 90 capsule 3   • AMLODIPINE BESYLATE 5 MG Oral Tab TAKE 1 TABLET BY MOUTH EVERY DAY 90 tablet 3   • finasteride 5 MG Oral Tab Take 1 tablet (5 mg total) by mouth o Concern: Yes        Occupational Exposure: Not Asked        Hobby Hazards: Not Asked        Sleep Concern: Not Asked        Stress Concern: Not Asked        Weight Concern: Not Asked        Special Diet: Not Asked        Back Care: Not Asked        Exercis current visit. Patient concerned with multiple pigmented lesions. In particular crusted lesion at forehead, brow scabbing crusting scaling occasionally bleeds originally from [place].  Patient's granddaughter studying medicine there currently.   Roberta (disseminated superficial actinic porokeratosis)  Benign neoplasm of skin of upper limb, including shoulder, unspecified laterality    See details on map.       Remarkable for:    : pt with enlarging lesion  Spec 1 Description >>>>>: left media l brow  Spec return as noted in follow-up/ above. This note was generated using Dragon voice recognition software. Please contact me regarding any confusion resulting from errors in recognition.     Encounter Times  Including precharting, reviewing chart, prior note

## 2021-05-25 VITALS
DIASTOLIC BLOOD PRESSURE: 60 MMHG | WEIGHT: 174 LBS | HEART RATE: 64 BPM | HEIGHT: 68 IN | BODY MASS INDEX: 26.37 KG/M2 | SYSTOLIC BLOOD PRESSURE: 120 MMHG | OXYGEN SATURATION: 98 %

## 2021-07-18 DIAGNOSIS — R35.1 NOCTURIA: ICD-10-CM

## 2021-07-18 DIAGNOSIS — R97.20 ELEVATED PSA: ICD-10-CM

## 2021-07-18 DIAGNOSIS — N40.1 BENIGN NON-NODULAR PROSTATIC HYPERPLASIA WITH LOWER URINARY TRACT SYMPTOMS: ICD-10-CM

## 2021-07-18 RX ORDER — FINASTERIDE 5 MG/1
TABLET, FILM COATED ORAL
Qty: 90 TABLET | Refills: 2 | Status: SHIPPED | OUTPATIENT
Start: 2021-07-18

## 2021-08-04 DIAGNOSIS — R60.0 BILATERAL LEG EDEMA: ICD-10-CM

## 2021-08-04 DIAGNOSIS — I10 ESSENTIAL HYPERTENSION: ICD-10-CM

## 2021-08-04 RX ORDER — FUROSEMIDE 40 MG/1
40 TABLET ORAL DAILY
Qty: 90 TABLET | Refills: 0 | Status: SHIPPED | OUTPATIENT
Start: 2021-08-04 | End: 2021-08-16

## 2021-08-04 RX ORDER — FUROSEMIDE 20 MG/1
20 TABLET ORAL
Qty: 90 TABLET | Refills: 0 | Status: SHIPPED | OUTPATIENT
Start: 2021-08-04 | End: 2021-08-16

## 2021-08-04 NOTE — TELEPHONE ENCOUNTER
Per the patient he needs both the Furosemide 40 mg in the morning and Furosemide 20 mg at night. 2 separate scripts. I pended and placed a note to the pharmacy.

## 2021-08-04 NOTE — TELEPHONE ENCOUNTER
[place] message sent. need clarification. RN=call and clarify if patient is taking both furosemide 40 mg in the morning and 20  mg at night, please change the SIG to every morning for the 40 mg to prevent future issue. Then refill.         Refill passed per

## 2021-08-04 NOTE — TELEPHONE ENCOUNTER
Refilled times 1 but needs appointment before the next prescription will be filled. Please call patient and set up an office visit as overdue for diabetic visit. Declines

## 2021-08-16 ENCOUNTER — OFFICE VISIT (OUTPATIENT)
Dept: FAMILY MEDICINE CLINIC | Facility: CLINIC | Age: 81
End: 2021-08-16

## 2021-08-16 VITALS
HEIGHT: 67 IN | TEMPERATURE: 99 F | SYSTOLIC BLOOD PRESSURE: 135 MMHG | BODY MASS INDEX: 26.81 KG/M2 | DIASTOLIC BLOOD PRESSURE: 72 MMHG | WEIGHT: 170.81 LBS | HEART RATE: 64 BPM

## 2021-08-16 DIAGNOSIS — I10 ESSENTIAL HYPERTENSION: ICD-10-CM

## 2021-08-16 DIAGNOSIS — E11.29 CONTROLLED TYPE 2 DIABETES MELLITUS WITH MICROALBUMINURIA, WITHOUT LONG-TERM CURRENT USE OF INSULIN (HCC): Primary | ICD-10-CM

## 2021-08-16 DIAGNOSIS — E78.2 MIXED HYPERLIPIDEMIA: ICD-10-CM

## 2021-08-16 DIAGNOSIS — R60.0 BILATERAL LEG EDEMA: ICD-10-CM

## 2021-08-16 DIAGNOSIS — I25.10 CORONARY ARTERY DISEASE INVOLVING NATIVE CORONARY ARTERY OF NATIVE HEART WITHOUT ANGINA PECTORIS: ICD-10-CM

## 2021-08-16 DIAGNOSIS — R80.9 CONTROLLED TYPE 2 DIABETES MELLITUS WITH MICROALBUMINURIA, WITHOUT LONG-TERM CURRENT USE OF INSULIN (HCC): Primary | ICD-10-CM

## 2021-08-16 DIAGNOSIS — I10 ESSENTIAL HYPERTENSION, BENIGN: ICD-10-CM

## 2021-08-16 PROCEDURE — 3008F BODY MASS INDEX DOCD: CPT | Performed by: FAMILY MEDICINE

## 2021-08-16 PROCEDURE — 99214 OFFICE O/P EST MOD 30 MIN: CPT | Performed by: FAMILY MEDICINE

## 2021-08-16 PROCEDURE — 3078F DIAST BP <80 MM HG: CPT | Performed by: FAMILY MEDICINE

## 2021-08-16 PROCEDURE — 3075F SYST BP GE 130 - 139MM HG: CPT | Performed by: FAMILY MEDICINE

## 2021-08-16 RX ORDER — AMLODIPINE BESYLATE 5 MG/1
5 TABLET ORAL DAILY
Qty: 90 TABLET | Refills: 3 | Status: SHIPPED | OUTPATIENT
Start: 2021-08-16

## 2021-08-16 RX ORDER — ATORVASTATIN CALCIUM 80 MG/1
80 TABLET, FILM COATED ORAL DAILY
Qty: 90 TABLET | Refills: 2 | Status: SHIPPED | OUTPATIENT
Start: 2021-08-16

## 2021-08-16 RX ORDER — FUROSEMIDE 40 MG/1
40 TABLET ORAL DAILY
Qty: 90 TABLET | Refills: 2 | Status: SHIPPED | OUTPATIENT
Start: 2021-08-16

## 2021-08-16 RX ORDER — FUROSEMIDE 20 MG/1
20 TABLET ORAL
Qty: 90 TABLET | Refills: 2 | Status: SHIPPED | OUTPATIENT
Start: 2021-08-16

## 2021-08-16 NOTE — PROGRESS NOTES
Patient ID: Angle Carlson is a [de-identified]year old male. HPI  Patient presents with:  Diabetes: follow up     Last seen by me on 3/15/2021.     Patient denies any chest pain, shortness breath, diaphoresis, dizziness, hypoglycemia, numbness or tingling in the • PT W/ CORONARY ARTERY STENT  2015          Current Outpatient Medications   Medication Sig Dispense Refill   • furosemide 40 MG Oral Tab Take 1 tablet (40 mg total) by mouth daily.  90 tablet 0   • furosemide 20 MG Oral Tab Take 1 tablet (20 mg total) RANDOM URINE; Future  Diet-controlled diabetes. He will come back for labs fasting. Essential hypertension  -     BASIC METABOLIC PANEL (8); Future  -     furosemide 40 MG Oral Tab; Take 1 tablet (40 mg total) by mouth daily.   -     furosemide 20 MG Oral presence. I have reviewed the chart and discharge instructions (if applicable) and agree that the record reflects my personal performance and is accurate and complete.   [address] [place] [address], , 8/16/2021, 3:21 PM

## 2021-08-18 ENCOUNTER — LAB ENCOUNTER (OUTPATIENT)
Dept: LAB | Age: 81
End: 2021-08-18
Attending: FAMILY MEDICINE
Payer: COMMERCIAL

## 2021-08-18 ENCOUNTER — OFFICE VISIT (OUTPATIENT)
Dept: DERMATOLOGY CLINIC | Facility: CLINIC | Age: 81
End: 2021-08-18

## 2021-08-18 DIAGNOSIS — D23.30 BENIGN NEOPLASM OF SKIN OF FACE: ICD-10-CM

## 2021-08-18 DIAGNOSIS — L72.0 EPIDERMAL CYST: ICD-10-CM

## 2021-08-18 DIAGNOSIS — L82.1 SEBORRHEIC KERATOSES: Primary | ICD-10-CM

## 2021-08-18 DIAGNOSIS — L56.5 DSAP (DISSEMINATED SUPERFICIAL ACTINIC POROKERATOSIS): ICD-10-CM

## 2021-08-18 DIAGNOSIS — E11.29 CONTROLLED TYPE 2 DIABETES MELLITUS WITH MICROALBUMINURIA, WITHOUT LONG-TERM CURRENT USE OF INSULIN (HCC): ICD-10-CM

## 2021-08-18 DIAGNOSIS — D23.9 BENIGN NEOPLASM OF SKIN, UNSPECIFIED LOCATION: ICD-10-CM

## 2021-08-18 DIAGNOSIS — R80.9 CONTROLLED TYPE 2 DIABETES MELLITUS WITH MICROALBUMINURIA, WITHOUT LONG-TERM CURRENT USE OF INSULIN (HCC): ICD-10-CM

## 2021-08-18 DIAGNOSIS — E78.2 MIXED HYPERLIPIDEMIA: ICD-10-CM

## 2021-08-18 DIAGNOSIS — I10 ESSENTIAL HYPERTENSION: ICD-10-CM

## 2021-08-18 DIAGNOSIS — L81.4 LENTIGO: ICD-10-CM

## 2021-08-18 LAB
ALT SERPL-CCNC: 29 U/L
ANION GAP SERPL CALC-SCNC: 2 MMOL/L (ref 0–18)
AST SERPL-CCNC: 17 U/L (ref 15–37)
BUN BLD-MCNC: 21 MG/DL (ref 7–18)
BUN/CREAT SERPL: 20.8 (ref 10–20)
CALCIUM BLD-MCNC: 9 MG/DL (ref 8.5–10.1)
CHLORIDE SERPL-SCNC: 107 MMOL/L (ref 98–112)
CHOLEST SMN-MCNC: 78 MG/DL (ref ?–200)
CO2 SERPL-SCNC: 32 MMOL/L (ref 21–32)
CREAT BLD-MCNC: 1.01 MG/DL
CREAT UR-SCNC: 195 MG/DL
EST. AVERAGE GLUCOSE BLD GHB EST-MCNC: 146 MG/DL (ref 68–126)
GLUCOSE BLD-MCNC: 95 MG/DL (ref 70–99)
HBA1C MFR BLD HPLC: 6.7 % (ref ?–5.7)
HDLC SERPL-MCNC: 43 MG/DL (ref 40–59)
LDLC SERPL CALC-MCNC: 23 MG/DL (ref ?–100)
MICROALBUMIN UR-MCNC: 54.4 MG/DL
MICROALBUMIN/CREAT 24H UR-RTO: 279 UG/MG (ref ?–30)
NONHDLC SERPL-MCNC: 35 MG/DL (ref ?–130)
OSMOLALITY SERPL CALC.SUM OF ELEC: 295 MOSM/KG (ref 275–295)
PATIENT FASTING Y/N/NP: YES
PATIENT FASTING Y/N/NP: YES
POTASSIUM SERPL-SCNC: 4.3 MMOL/L (ref 3.5–5.1)
SODIUM SERPL-SCNC: 141 MMOL/L (ref 136–145)
TRIGL SERPL-MCNC: 40 MG/DL (ref 30–149)
TSI SER-ACNC: 0.95 MIU/ML (ref 0.36–3.74)
VLDLC SERPL CALC-MCNC: 5 MG/DL (ref 0–30)

## 2021-08-18 PROCEDURE — 99213 OFFICE O/P EST LOW 20 MIN: CPT | Performed by: DERMATOLOGY

## 2021-08-18 PROCEDURE — 83036 HEMOGLOBIN GLYCOSYLATED A1C: CPT

## 2021-08-18 PROCEDURE — 80061 LIPID PANEL: CPT

## 2021-08-18 PROCEDURE — 82570 ASSAY OF URINE CREATININE: CPT

## 2021-08-18 PROCEDURE — 84450 TRANSFERASE (AST) (SGOT): CPT

## 2021-08-18 PROCEDURE — 80048 BASIC METABOLIC PNL TOTAL CA: CPT

## 2021-08-18 PROCEDURE — 82043 UR ALBUMIN QUANTITATIVE: CPT

## 2021-08-18 PROCEDURE — 84443 ASSAY THYROID STIM HORMONE: CPT

## 2021-08-18 PROCEDURE — 36415 COLL VENOUS BLD VENIPUNCTURE: CPT

## 2021-08-18 PROCEDURE — 84460 ALANINE AMINO (ALT) (SGPT): CPT

## 2021-08-18 RX ORDER — METRONIDAZOLE 7.5 MG/G
GEL TOPICAL
Qty: 60 G | Refills: 1 | Status: SHIPPED | OUTPATIENT
Start: 2021-08-18

## 2021-08-19 ENCOUNTER — APPOINTMENT (OUTPATIENT)
Dept: CARDIOLOGY | Age: 81
End: 2021-08-19

## 2021-08-30 NOTE — PROGRESS NOTES
Mikie Calero is a [de-identified]year old male. HPI:     CC:  Patient presents with:  Full Skin Exam: LOV: 5/12/21. patient presents for full body check. would like mole on left side of nose looked at. denies any recent changes or growth on mole.  personal hx of SK by mouth 2 (two) times daily. 90 tablet 2   • furosemide 40 MG Oral Tab Take 1 tablet (40 mg total) by mouth daily. 90 tablet 2   • furosemide 20 MG Oral Tab Take 1 tablet (20 mg total) by mouth daily with dinner.  90 tablet 2   • FINASTERIDE 5 MG Oral Tab No      Sexual activity: Not on file    Other Topics      Concerns:         Service: Not Asked        Blood Transfusions: Not Asked        Caffeine Concern: Yes        Occupational Exposure: Not Asked        Hobby Hazards: Not Asked        [employer] SK[facility]. denies any personal or family Hx of skin cancer. Past notes/ records and appropriate/relevant lab results including pathology and past body maps reviewed. Updated and new information noted in current visit.      Patient concerned with multiple p 60 g 1     Sig: Use at bedtime to nose         Seborrheic keratoses  (primary encounter diagnosis)  Benign neoplasm of skin of face  Epidermal cyst  Benign neoplasm of skin, unspecified location  Lentigo  Dsap (disseminated superficial actinic porokeratosi therapies, risks benefits discussed. Pathophysiology discussed with patient. Therapeutic options reviewed. See  Medications in grid. Instructions reviewed at length.     Benign nevi, seborrheic  keratoses, cherry angiomas:  Reassurance regarding other dori

## 2021-09-07 RX ORDER — TAMSULOSIN HYDROCHLORIDE 0.4 MG/1
CAPSULE ORAL
Qty: 60 CAPSULE | Refills: 0 | Status: SHIPPED | OUTPATIENT
Start: 2021-09-07 | End: 2021-09-29

## 2021-09-07 NOTE — TELEPHONE ENCOUNTER
This RN approved 60 day supply of Tamsulosin today, 9/7 until seen at the office for his yearly appointment.  Patient was last seen by Dr Anita Espinoza on 10/19/20: See office notes below:         Benign Prostatic Hypertrophy Medications    Protocol Criteria:  • Appo

## 2021-09-22 ENCOUNTER — TELEPHONE (OUTPATIENT)
Dept: ADMINISTRATIVE | Age: 81
End: 2021-09-22

## 2021-09-22 DIAGNOSIS — Z01.00 DIABETIC EYE EXAM (HCC): Primary | ICD-10-CM

## 2021-09-22 DIAGNOSIS — E11.9 DIABETIC EYE EXAM (HCC): Primary | ICD-10-CM

## 2021-09-22 NOTE — TELEPHONE ENCOUNTER
Dr. Eri Fermin,    Patient is schedule with Dr. Quin Hemphill for a diabetic follow up eye exam.  His current referral has . Pended referral please review diagnosis and sign off if you agree. Thank you.   Satnam Little

## 2021-09-29 RX ORDER — TAMSULOSIN HYDROCHLORIDE 0.4 MG/1
CAPSULE ORAL
Qty: 30 CAPSULE | Refills: 1 | Status: SHIPPED | OUTPATIENT
Start: 2021-09-29 | End: 2021-10-25

## 2021-09-29 NOTE — TELEPHONE ENCOUNTER
This RN approved the Tamsulosin refill request today, 9/29. Patient was last seen on 10/19/20 and has a follow up on 10/20/21.      Benign Prostatic Hypertrophy Medications      Protocol Criteria:  • Appointment scheduled in the past 12 months or in the nex

## 2021-10-11 ENCOUNTER — IMMUNIZATION (OUTPATIENT)
Dept: FAMILY MEDICINE CLINIC | Facility: CLINIC | Age: 81
End: 2021-10-11

## 2021-10-11 DIAGNOSIS — Z23 NEED FOR VACCINATION: Primary | ICD-10-CM

## 2021-10-11 PROCEDURE — 90662 IIV NO PRSV INCREASED AG IM: CPT | Performed by: FAMILY MEDICINE

## 2021-10-11 PROCEDURE — 90471 IMMUNIZATION ADMIN: CPT | Performed by: FAMILY MEDICINE

## 2021-10-20 ENCOUNTER — OFFICE VISIT (OUTPATIENT)
Dept: SURGERY | Facility: CLINIC | Age: 81
End: 2021-10-20

## 2021-10-20 VITALS
BODY MASS INDEX: 26.68 KG/M2 | HEIGHT: 67 IN | SYSTOLIC BLOOD PRESSURE: 155 MMHG | WEIGHT: 170 LBS | HEART RATE: 67 BPM | DIASTOLIC BLOOD PRESSURE: 68 MMHG

## 2021-10-20 DIAGNOSIS — N39.41 URGE INCONTINENCE: ICD-10-CM

## 2021-10-20 DIAGNOSIS — N40.1 BENIGN PROSTATIC HYPERPLASIA WITH INCOMPLETE BLADDER EMPTYING: Primary | ICD-10-CM

## 2021-10-20 DIAGNOSIS — R39.14 BENIGN PROSTATIC HYPERPLASIA WITH INCOMPLETE BLADDER EMPTYING: Primary | ICD-10-CM

## 2021-10-20 DIAGNOSIS — N52.01 ERECTILE DYSFUNCTION DUE TO ARTERIAL INSUFFICIENCY: ICD-10-CM

## 2021-10-20 DIAGNOSIS — Z79.82 LONG TERM CURRENT USE OF ASPIRIN: ICD-10-CM

## 2021-10-20 DIAGNOSIS — R35.1 NOCTURIA: ICD-10-CM

## 2021-10-20 PROCEDURE — 99214 OFFICE O/P EST MOD 30 MIN: CPT | Performed by: UROLOGY

## 2021-10-20 PROCEDURE — 3077F SYST BP >= 140 MM HG: CPT | Performed by: UROLOGY

## 2021-10-20 PROCEDURE — 3078F DIAST BP <80 MM HG: CPT | Performed by: UROLOGY

## 2021-10-20 PROCEDURE — 3008F BODY MASS INDEX DOCD: CPT | Performed by: UROLOGY

## 2021-10-20 NOTE — PATIENT INSTRUCTIONS
Saw Mckeon M.D.     1.   Continue tamsulosin 0.4 mg daily     2.  Continue finasteride 5 mg daily     3.   Schedule transrectal ultrasound of prostate (no biopsy) at the hospital;  Please call [phone] to

## 2021-10-20 NOTE — PROGRESS NOTES
HPI:    Patient ID: Nobie Kanner is a [de-identified]year old male. HPI      BPH  Chronic. Patient is currently taking finasteride 5 mg daily. He presently denies any associated pelvic pain or discomfort. He feels this is stable.      ED   Chronic.  He is not cur prostate [facility] February 2, 2009--- volume was 86.1 cc, slightly increased compared to [address] [place] [address] ultrasound of 2008 when it was 79.7 cc.  1.8 cm hypoechoic area left transition zone, adjacent to the peripheral zone--had increased blood flow.   benign prostatic tiss Dispense Refill   • tamsulosin (FLOMAX) cap TAKE 1 CAPSULE BY MOUTH DAILY TAKE 30 MINUTES AFTER THE SAME MEAL EACH DAY 30 capsule 1   • amLODIPine besylate 5 MG Oral Tab Take 1 tablet (5 mg total) by mouth daily.  90 tablet 3   • atorvastatin 80 MG Oral Tab Used    Vaping Use      Vaping Use: Never used    Alcohol use: Not Currently      Alcohol/week: 0.0 standard drinks      Comment: quit in 2007    Drug use: No                 PHYSICAL EXAM:    Physical Exam  Nursing note reviewed.    Constitutional:       G history, examining patient, reviewing past medical records, current medications, allergies, laboratories and imaging studies with patient, discussing treatment options, answering questions about treatment, and coordinating care, then documenting the encoun also currently taking furosemide, which he knows can worsen urination. Patient decides to continue medication as prescribed.  Additionally, patient is considering prostate surgery and will undergo proper preparations for prostate surgery (please see BPH abo 10/20/2021, 9:09 AM.    I, Colan Homans, MD,  personally performed the services described in this documentation. All medical record entries made by the scribe were at my direction and in my presence.   I have reviewed the chart and discharge instruction

## 2021-10-25 RX ORDER — TAMSULOSIN HYDROCHLORIDE 0.4 MG/1
0.4 CAPSULE ORAL DAILY
Qty: 90 CAPSULE | Refills: 3 | Status: SHIPPED | OUTPATIENT
Start: 2021-10-25 | End: 2023-03-25

## 2021-10-27 ENCOUNTER — HOSPITAL ENCOUNTER (OUTPATIENT)
Dept: ULTRASOUND IMAGING | Facility: HOSPITAL | Age: 81
Discharge: HOME | End: 2021-10-27
Attending: UROLOGY
Payer: COMMERCIAL

## 2021-10-27 DIAGNOSIS — N40.1 BENIGN PROSTATIC HYPERPLASIA WITH INCOMPLETE BLADDER EMPTYING: ICD-10-CM

## 2021-10-27 DIAGNOSIS — R39.14 BENIGN PROSTATIC HYPERPLASIA WITH INCOMPLETE BLADDER EMPTYING: ICD-10-CM

## 2021-10-27 PROCEDURE — 76872 US TRANSRECTAL: CPT | Performed by: UROLOGY

## 2021-11-15 ENCOUNTER — TELEPHONE (OUTPATIENT)
Dept: SURGERY | Facility: CLINIC | Age: 81
End: 2021-11-15

## 2021-11-15 ENCOUNTER — PROCEDURE (OUTPATIENT)
Dept: SURGERY | Facility: CLINIC | Age: 81
End: 2021-11-15

## 2021-11-15 VITALS
BODY MASS INDEX: 26.68 KG/M2 | WEIGHT: 170 LBS | HEIGHT: 67 IN | HEART RATE: 59 BPM | SYSTOLIC BLOOD PRESSURE: 142 MMHG | DIASTOLIC BLOOD PRESSURE: 70 MMHG

## 2021-11-15 DIAGNOSIS — N13.8 BPH WITH OBSTRUCTION/LOWER URINARY TRACT SYMPTOMS: Primary | ICD-10-CM

## 2021-11-15 DIAGNOSIS — N40.1 BPH WITH OBSTRUCTION/LOWER URINARY TRACT SYMPTOMS: Primary | ICD-10-CM

## 2021-11-15 DIAGNOSIS — N52.01 ERECTILE DYSFUNCTION DUE TO ARTERIAL INSUFFICIENCY: ICD-10-CM

## 2021-11-15 DIAGNOSIS — N39.41 URGE INCONTINENCE: ICD-10-CM

## 2021-11-15 DIAGNOSIS — R35.1 NOCTURIA: ICD-10-CM

## 2021-11-15 DIAGNOSIS — N13.8 BPH WITH URINARY OBSTRUCTION: Primary | ICD-10-CM

## 2021-11-15 DIAGNOSIS — Z79.82 LONG TERM CURRENT USE OF ASPIRIN: ICD-10-CM

## 2021-11-15 DIAGNOSIS — N40.1 BPH WITH URINARY OBSTRUCTION: Primary | ICD-10-CM

## 2021-11-15 PROCEDURE — 52000 CYSTOURETHROSCOPY: CPT | Performed by: UROLOGY

## 2021-11-15 PROCEDURE — 3008F BODY MASS INDEX DOCD: CPT | Performed by: UROLOGY

## 2021-11-15 PROCEDURE — 3077F SYST BP >= 140 MM HG: CPT | Performed by: UROLOGY

## 2021-11-15 PROCEDURE — 99214 OFFICE O/P EST MOD 30 MIN: CPT | Performed by: UROLOGY

## 2021-11-15 PROCEDURE — 3078F DIAST BP <80 MM HG: CPT | Performed by: UROLOGY

## 2021-11-15 RX ORDER — CIPROFLOXACIN 500 MG/1
500 TABLET, FILM COATED ORAL ONCE
Status: COMPLETED | OUTPATIENT
Start: 2021-11-15 | End: 2021-11-15

## 2021-11-15 RX ADMIN — CIPROFLOXACIN 500 MG: 500 TABLET, FILM COATED ORAL at 11:26:00

## 2021-11-15 NOTE — TELEPHONE ENCOUNTER
Pt had office cystoscopy today and I called cardio, Dr. Beatriz Garcia [phone] per Dr. Zaida Babcock needs to speak to his nurse; pt is on asa 81mg daily and we need to see how many days can pt stop his asa 81mg for a procedure?  I copied and pasted part of Dr. Missy Barr

## 2021-11-15 NOTE — PROGRESS NOTES
PREOPERATIVE DIAGNOSIS:     BPH     POSTOP DIAGNOSIS:               The same;  No tumors, stones, or CIS of the bladder or bladder neck    PROCEDURE:              Cystoscopy              ANESTHESIA:     2% Xylocaine jelly local;  also see above;     FINDING Incontinence  Chronic. Mild. Occasional. He continues to wear 1 pad per 24 hours, including at night; sometimes pad is damp.  He feels this is stable as this has not worsened since last office visit with me.     Voiding Dysfunction  On 10/20/2021 Patient ha 09/01/2017  office visit with me; Continue tamsulosin 0.4 mg daily; Continue finasteride 5 mg daily; DECIDES TO STOP FURTHER PSA TESTING after S/p 4 negative prostate biopsies; 2003, 2006, 2008, 2009. 09/01/2017 9/6/2018 office visit with me;  On CHELLE, pr metabolic 24 hour urine protein = 277.2 (mildly elevated; range is 0.0-150.0)  2/20/2017 PSA = 2.1 x 2 = 4.2 (adjusted for Finasteride),   34 % free PSA (8 % probability of prostate cancer)  10/18/2014 PSA = 3.0 x 2 = 6.0 (adjusted for Finasteride)  1/17/2 vs observation.  I fully explained to patient the benefits, risks, and alternatives to this treatment option and I answered patient's questions; patient decides to undergo cystoscopy with GreenLight ablation of the prostate under general. I fully discussed uncomfortable burning pain with urination, take either Tylenol or an NSAID such as Advil, Motrin, Aleve, ibuprofen; if that does not help and if any kind of burning pain still bothers you, you can take over-the-counter \"Azo\" 95 mg capsule, 1 capsule ever care, then documenting the visit component of today's encounter.             By signing my name below, Valerie Avendaño,  attest that this documentation has been prepared under the direction and in the presence of Leroy Navarro MD.   Electronically Sign

## 2021-11-15 NOTE — PATIENT INSTRUCTIONS
Johanna Collado M.D.      1.  If you have uncomfortable burning pain with urination, take either Tylenol or an NSAID such as Advil, Motrin, Aleve, ibuprofen; if that does not help and if any kind of burning p

## 2021-11-17 NOTE — TELEPHONE ENCOUNTER
Received call from [address], nurse from [facility] office. States she will fax over pre op clearance for holding aspirin. Received fax from [facility]. Per instructions: Patient may hold his aspirin for 7 days for procedure.  Will forwa

## 2021-11-18 DIAGNOSIS — N39.0 RECURRENT UTI: Primary | ICD-10-CM

## 2021-11-18 RX ORDER — AMOXICILLIN 500 MG/1
500 TABLET, FILM COATED ORAL 3 TIMES DAILY
Qty: 30 TABLET | Refills: 0 | Status: SHIPPED | OUTPATIENT
Start: 2021-11-18 | End: 2022-02-04 | Stop reason: ALTCHOICE

## 2021-11-18 NOTE — TELEPHONE ENCOUNTER
Urological surgery scheduling,    Please call patient and notify him that we received information from Dr. Corina Uriostegui, who stated that patient can hold aspirin for 7 days.   In light of all of this, I recommend the following  ---    1) patient to hold low-dose 81

## 2021-11-19 NOTE — TELEPHONE ENCOUNTER
Called patient and left message to call back to schedule surgery and to inform patient of  suggestion to hold low-dose aspirin 6 days prior to surgery and 1 day after surgery. We will await patient to call back.

## 2021-11-19 NOTE — PROGRESS NOTES
I sent patient the following message by means of \"my chart\" =  Nicole Hortonr,  11/16/2021 urine culture  [phone] nonhemolytic Streptococcus; usually this is a contaminant bacteria found on the surface of the skin but sometimes can cause a true bladder or pros

## 2021-11-22 NOTE — TELEPHONE ENCOUNTER
LUCY. We are trying to coordinate surgery possibly 12/7/2021 with  at [address] [place] [address] I will await a call back.

## 2021-12-07 NOTE — TELEPHONE ENCOUNTER
Spoke with patient, scheduled Cystoscopy, Green light laser ablation of prostate, Thursday 01/13/2022, [facility]/outpatient, went over pre-op instructions all questions answered patient verbalized understanding.

## 2022-01-03 ENCOUNTER — LAB ENCOUNTER (OUTPATIENT)
Dept: LAB | Age: 82
End: 2022-01-03
Attending: UROLOGY
Payer: COMMERCIAL

## 2022-01-03 DIAGNOSIS — N39.0 RECURRENT UTI: ICD-10-CM

## 2022-01-03 PROCEDURE — 87086 URINE CULTURE/COLONY COUNT: CPT

## 2022-01-06 RX ORDER — SODIUM CHLORIDE, SODIUM LACTATE, POTASSIUM CHLORIDE, CALCIUM CHLORIDE 600; 310; 30; 20 MG/100ML; MG/100ML; MG/100ML; MG/100ML
INJECTION, SOLUTION INTRAVENOUS CONTINUOUS
Status: DISCONTINUED | OUTPATIENT
Start: 2022-01-06 | End: 2022-01-06

## 2022-01-10 ENCOUNTER — LAB ENCOUNTER (OUTPATIENT)
Dept: LAB | Facility: HOSPITAL | Age: 82
End: 2022-01-10
Attending: UROLOGY
Payer: COMMERCIAL

## 2022-01-10 DIAGNOSIS — Z01.818 PREOP TESTING: ICD-10-CM

## 2022-01-11 LAB — SARS-COV-2 RNA RESP QL NAA+PROBE: NOT DETECTED

## 2022-01-13 ENCOUNTER — ANESTHESIA EVENT (OUTPATIENT)
Dept: SURGERY | Facility: HOSPITAL | Age: 82
End: 2022-01-13
Payer: COMMERCIAL

## 2022-01-13 ENCOUNTER — ANESTHESIA (OUTPATIENT)
Dept: SURGERY | Facility: HOSPITAL | Age: 82
End: 2022-01-13
Payer: COMMERCIAL

## 2022-01-13 ENCOUNTER — HOSPITAL ENCOUNTER (OUTPATIENT)
Facility: HOSPITAL | Age: 82
Discharge: HOME | End: 2022-01-14
Attending: UROLOGY | Admitting: UROLOGY
Payer: COMMERCIAL

## 2022-01-13 ENCOUNTER — TELEPHONE (OUTPATIENT)
Dept: SURGERY | Facility: CLINIC | Age: 82
End: 2022-01-13

## 2022-01-13 DIAGNOSIS — N40.1 BPH WITH URINARY OBSTRUCTION: ICD-10-CM

## 2022-01-13 DIAGNOSIS — N13.8 BPH WITH URINARY OBSTRUCTION: ICD-10-CM

## 2022-01-13 DIAGNOSIS — Z01.818 PREOP TESTING: Primary | ICD-10-CM

## 2022-01-13 LAB
GLUCOSE BLDC GLUCOMTR-MCNC: 106 MG/DL (ref 70–99)
GLUCOSE BLDC GLUCOMTR-MCNC: 121 MG/DL (ref 70–99)

## 2022-01-13 PROCEDURE — 99214 OFFICE O/P EST MOD 30 MIN: CPT | Performed by: HOSPITALIST

## 2022-01-13 PROCEDURE — 52648 LASER SURGERY OF PROSTATE: CPT | Performed by: UROLOGY

## 2022-01-13 PROCEDURE — 0V508ZZ DESTRUCTION OF PROSTATE, VIA NATURAL OR ARTIFICIAL OPENING ENDOSCOPIC: ICD-10-PCS | Performed by: UROLOGY

## 2022-01-13 PROCEDURE — 0T7D8ZZ DILATION OF URETHRA, VIA NATURAL OR ARTIFICIAL OPENING ENDOSCOPIC: ICD-10-PCS | Performed by: UROLOGY

## 2022-01-13 RX ORDER — LIDOCAINE HYDROCHLORIDE 10 MG/ML
INJECTION, SOLUTION EPIDURAL; INFILTRATION; INTRACAUDAL; PERINEURAL AS NEEDED
Status: DISCONTINUED | OUTPATIENT
Start: 2022-01-13 | End: 2022-01-13 | Stop reason: SURG

## 2022-01-13 RX ORDER — EPHEDRINE SULFATE 50 MG/ML
INJECTION INTRAVENOUS AS NEEDED
Status: DISCONTINUED | OUTPATIENT
Start: 2022-01-13 | End: 2022-01-13 | Stop reason: SURG

## 2022-01-13 RX ORDER — HYDROMORPHONE HYDROCHLORIDE 1 MG/ML
0.2 INJECTION, SOLUTION INTRAMUSCULAR; INTRAVENOUS; SUBCUTANEOUS EVERY 5 MIN PRN
Status: DISCONTINUED | OUTPATIENT
Start: 2022-01-13 | End: 2022-01-13 | Stop reason: HOSPADM

## 2022-01-13 RX ORDER — AMLODIPINE BESYLATE 5 MG/1
5 TABLET ORAL EVERY MORNING
Status: DISCONTINUED | OUTPATIENT
Start: 2022-01-14 | End: 2022-01-14

## 2022-01-13 RX ORDER — HYDROCODONE BITARTRATE AND ACETAMINOPHEN 5; 325 MG/1; MG/1
2 TABLET ORAL AS NEEDED
Status: DISCONTINUED | OUTPATIENT
Start: 2022-01-13 | End: 2022-01-13 | Stop reason: HOSPADM

## 2022-01-13 RX ORDER — FUROSEMIDE 20 MG/1
20 TABLET ORAL
Status: DISCONTINUED | OUTPATIENT
Start: 2022-01-13 | End: 2022-01-14

## 2022-01-13 RX ORDER — HYDROCODONE BITARTRATE AND ACETAMINOPHEN 5; 325 MG/1; MG/1
TABLET ORAL
Qty: 5 TABLET | Refills: 0 | Status: SHIPPED | OUTPATIENT
Start: 2022-01-13

## 2022-01-13 RX ORDER — FINASTERIDE 5 MG/1
5 TABLET, FILM COATED ORAL EVERY MORNING
Status: DISCONTINUED | OUTPATIENT
Start: 2022-01-14 | End: 2022-01-14

## 2022-01-13 RX ORDER — TAMSULOSIN HYDROCHLORIDE 0.4 MG/1
0.4 CAPSULE ORAL EVERY MORNING
Status: DISCONTINUED | OUTPATIENT
Start: 2022-01-14 | End: 2022-01-14

## 2022-01-13 RX ORDER — ACETAMINOPHEN 500 MG
1000 TABLET ORAL ONCE
Status: DISCONTINUED | OUTPATIENT
Start: 2022-01-13 | End: 2022-01-13 | Stop reason: HOSPADM

## 2022-01-13 RX ORDER — SODIUM CHLORIDE, SODIUM LACTATE, POTASSIUM CHLORIDE, CALCIUM CHLORIDE 600; 310; 30; 20 MG/100ML; MG/100ML; MG/100ML; MG/100ML
INJECTION, SOLUTION INTRAVENOUS CONTINUOUS
Status: DISCONTINUED | OUTPATIENT
Start: 2022-01-13 | End: 2022-01-14

## 2022-01-13 RX ORDER — MORPHINE SULFATE 4 MG/ML
4 INJECTION, SOLUTION INTRAMUSCULAR; INTRAVENOUS EVERY 10 MIN PRN
Status: DISCONTINUED | OUTPATIENT
Start: 2022-01-13 | End: 2022-01-13 | Stop reason: HOSPADM

## 2022-01-13 RX ORDER — MORPHINE SULFATE 4 MG/ML
2 INJECTION, SOLUTION INTRAMUSCULAR; INTRAVENOUS EVERY 10 MIN PRN
Status: DISCONTINUED | OUTPATIENT
Start: 2022-01-13 | End: 2022-01-13 | Stop reason: HOSPADM

## 2022-01-13 RX ORDER — CEFTRIAXONE 1 G/1
INJECTION, POWDER, FOR SOLUTION INTRAMUSCULAR; INTRAVENOUS AS NEEDED
Status: DISCONTINUED | OUTPATIENT
Start: 2022-01-13 | End: 2022-01-13 | Stop reason: SURG

## 2022-01-13 RX ORDER — METOPROLOL TARTRATE 5 MG/5ML
2.5 INJECTION INTRAVENOUS ONCE
Status: DISCONTINUED | OUTPATIENT
Start: 2022-01-13 | End: 2022-01-13 | Stop reason: HOSPADM

## 2022-01-13 RX ORDER — HYDROCODONE BITARTRATE AND ACETAMINOPHEN 5; 325 MG/1; MG/1
1 TABLET ORAL AS NEEDED
Status: DISCONTINUED | OUTPATIENT
Start: 2022-01-13 | End: 2022-01-13 | Stop reason: HOSPADM

## 2022-01-13 RX ORDER — ATROPA BELLADONNA AND OPIUM 16.2; 6 MG/1; MG/1
0.5 SUPPOSITORY RECTAL ONCE
Status: COMPLETED | OUTPATIENT
Start: 2022-01-13 | End: 2022-01-13

## 2022-01-13 RX ORDER — ONDANSETRON 2 MG/ML
INJECTION INTRAMUSCULAR; INTRAVENOUS AS NEEDED
Status: DISCONTINUED | OUTPATIENT
Start: 2022-01-13 | End: 2022-01-13 | Stop reason: SURG

## 2022-01-13 RX ORDER — DEXTROSE MONOHYDRATE 25 G/50ML
50 INJECTION, SOLUTION INTRAVENOUS
Status: DISCONTINUED | OUTPATIENT
Start: 2022-01-13 | End: 2022-01-13 | Stop reason: HOSPADM

## 2022-01-13 RX ORDER — ATORVASTATIN CALCIUM 80 MG/1
80 TABLET, FILM COATED ORAL DAILY
Status: DISCONTINUED | OUTPATIENT
Start: 2022-01-14 | End: 2022-01-14

## 2022-01-13 RX ORDER — HYDROMORPHONE HYDROCHLORIDE 1 MG/ML
0.6 INJECTION, SOLUTION INTRAMUSCULAR; INTRAVENOUS; SUBCUTANEOUS EVERY 5 MIN PRN
Status: DISCONTINUED | OUTPATIENT
Start: 2022-01-13 | End: 2022-01-13 | Stop reason: HOSPADM

## 2022-01-13 RX ORDER — ONDANSETRON 2 MG/ML
4 INJECTION INTRAMUSCULAR; INTRAVENOUS ONCE AS NEEDED
Status: DISCONTINUED | OUTPATIENT
Start: 2022-01-13 | End: 2022-01-13 | Stop reason: HOSPADM

## 2022-01-13 RX ORDER — MORPHINE SULFATE 10 MG/ML
6 INJECTION, SOLUTION INTRAMUSCULAR; INTRAVENOUS EVERY 10 MIN PRN
Status: DISCONTINUED | OUTPATIENT
Start: 2022-01-13 | End: 2022-01-13 | Stop reason: HOSPADM

## 2022-01-13 RX ORDER — NALOXONE HYDROCHLORIDE 0.4 MG/ML
80 INJECTION, SOLUTION INTRAMUSCULAR; INTRAVENOUS; SUBCUTANEOUS AS NEEDED
Status: ACTIVE | OUTPATIENT
Start: 2022-01-13 | End: 2022-01-13

## 2022-01-13 RX ORDER — DEXAMETHASONE SODIUM PHOSPHATE 4 MG/ML
VIAL (ML) INJECTION AS NEEDED
Status: DISCONTINUED | OUTPATIENT
Start: 2022-01-13 | End: 2022-01-13 | Stop reason: SURG

## 2022-01-13 RX ORDER — PROCHLORPERAZINE EDISYLATE 5 MG/ML
5 INJECTION INTRAMUSCULAR; INTRAVENOUS ONCE AS NEEDED
Status: DISCONTINUED | OUTPATIENT
Start: 2022-01-13 | End: 2022-01-13 | Stop reason: HOSPADM

## 2022-01-13 RX ORDER — HYDROMORPHONE HYDROCHLORIDE 1 MG/ML
0.4 INJECTION, SOLUTION INTRAMUSCULAR; INTRAVENOUS; SUBCUTANEOUS EVERY 5 MIN PRN
Status: DISCONTINUED | OUTPATIENT
Start: 2022-01-13 | End: 2022-01-13 | Stop reason: HOSPADM

## 2022-01-13 RX ORDER — SODIUM CHLORIDE 9 MG/ML
INJECTION, SOLUTION INTRAVENOUS CONTINUOUS
Status: DISCONTINUED | OUTPATIENT
Start: 2022-01-13 | End: 2022-01-14

## 2022-01-13 RX ORDER — ATROPA BELLADONNA AND OPIUM 16.2; 6 MG/1; MG/1
0.5 SUPPOSITORY RECTAL EVERY 6 HOURS PRN
Status: DISCONTINUED | OUTPATIENT
Start: 2022-01-13 | End: 2022-01-14

## 2022-01-13 RX ORDER — CEFADROXIL 500 MG/1
CAPSULE ORAL
Qty: 10 CAPSULE | Refills: 0 | Status: SHIPPED | OUTPATIENT
Start: 2022-01-13 | End: 2022-02-04 | Stop reason: ALTCHOICE

## 2022-01-13 RX ADMIN — DEXAMETHASONE SODIUM PHOSPHATE 4 MG: 4 MG/ML VIAL (ML) INJECTION at 08:48:00

## 2022-01-13 RX ADMIN — EPHEDRINE SULFATE 10 MG: 50 INJECTION INTRAVENOUS at 09:12:00

## 2022-01-13 RX ADMIN — EPHEDRINE SULFATE 10 MG: 50 INJECTION INTRAVENOUS at 09:19:00

## 2022-01-13 RX ADMIN — ONDANSETRON 4 MG: 2 INJECTION INTRAMUSCULAR; INTRAVENOUS at 08:48:00

## 2022-01-13 RX ADMIN — SODIUM CHLORIDE: 9 INJECTION, SOLUTION INTRAVENOUS at 08:46:00

## 2022-01-13 RX ADMIN — CEFTRIAXONE 1 G: 1 INJECTION, POWDER, FOR SOLUTION INTRAMUSCULAR; INTRAVENOUS at 08:46:00

## 2022-01-13 RX ADMIN — LIDOCAINE HYDROCHLORIDE 10 MG: 10 INJECTION, SOLUTION EPIDURAL; INFILTRATION; INTRACAUDAL; PERINEURAL at 08:53:00

## 2022-01-13 NOTE — ANESTHESIA PROCEDURE NOTES
Airway  Date/Time: 1/13/2022 8:54 AM  Urgency: Elective      General Information and Staff    Patient location during procedure: OR  Anesthesiologist: Eliseo Rivera MD  Performed: anesthesiologist     Indications and Patient Condition  Indications for a

## 2022-01-13 NOTE — H&P
PREOPERATIVE HISTORY AND PHYSICAL    PREOPERATIVE DIAGNOSIS:     BPH     POSTOP DIAGNOSIS:               The same;  No tumors, stones, or CIS of the bladder or bladder neck     PROCEDURE:              Cystoscopy               ANESTHESIA:     2% Xylocaine Odalys Justin feels this is stable.      Urge Incontinence  Chronic. Mild. Occasional. He continues to wear 1 pad per 24 hours, including at night; sometimes pad is damp.  He feels this is stable as this has not worsened since last office visit with me.     Voiding Dysfu the patient on Tamsulosin 0.4 mg.   09/01/2017  office visit with me; Continue tamsulosin 0.4 mg daily; Continue finasteride 5 mg daily; DECIDES TO STOP FURTHER PSA TESTING after S/p 4 negative prostate biopsies; 2003, 2006, 2008, 2009. 09/01/2017 9/6/201 31%, very fair removal so probability of prostate cancer is less than 8%.  4/37/7691 metabolic 24 hour urine protein = 277.2 (mildly elevated; range is 0.0-150.0)  2/20/2017 PSA = 2.1 x 2 = 4.2 (adjusted for Finasteride),   34 % free PSA (8 % probability o will not cover UroLift procedure. We discussed GreenLight ablation of the prostate vs Rezum vs observation.  I fully explained to patient the benefits, risks, and alternatives to this treatment option and I answered patient's questions; patient decides to u this and decides to proceed with the following:      TREATMENT PLAN :     1.   If you have uncomfortable burning pain with urination, take either Tylenol or an NSAID such as Advil, Motrin, Aleve, ibuprofen; if that does not help and if any kind of burning p

## 2022-01-13 NOTE — ANESTHESIA POSTPROCEDURE EVALUATION
Patient: Yamil Lopes    Procedure Summary     Date: 01/13/22 Room / Location: [address] [place] [address] MAIN OR 14 / [address] [place] [address] MAIN OR    Anesthesia Start: 0845 Anesthesia Stop: 1570    Procedure: cystoscopy, green light laser ablation of prostate (N/A ) Diagnosis:       BPH with

## 2022-01-13 NOTE — TELEPHONE ENCOUNTER
Urology nurses,    Please call patient at home tomorrow 1/14/2022 Friday morning after 8 AM and check on color of urine; if color of urine is good, have him come to the office to have Mendoza cath removed; if not good, wait until Monday.   I will likely send 1/14/2022    6. push enough liquids including eating soups, stews, anything with liquids to increase urine output; if color of urine is not bloody, you can drink normal amounts of liquids.     7. Call office nurses  in the morning after 8 AM and report on c problem should improve every month, it can take about 6 months until your bladder readjusts to the surgery and before it becomes less overactive.     15.  Please hold aspirin; restarted in 2 or 3 days; restart it  after there has been no significant blood i

## 2022-01-13 NOTE — PROGRESS NOTES
Pt had continued bleeding with CBI. Dr Cynthia Sanders here and applied traction to catheter to place pressure on prostate and stop bleeding. Pt to remain flat as minimal raising of HOB decreases traction and bleeding resumes.   CBI remains flowing keeping outpu

## 2022-01-13 NOTE — INTERVAL H&P NOTE
Pre-op Diagnosis: BPH with urinary obstruction [N40.1, N13.8]    The above referenced H&P was reviewed by Sriram Dsouza MD on 1/13/2022, the patient was examined and no significant changes have occurred in the patient's condition since the H&P was perf

## 2022-01-13 NOTE — BRIEF OP NOTE
[facility] POST ANESTHESIA CARE UNIT  Brief Op Note       Patients Name: Ruben Best  Attending Physician: Cr Hart MD  Operating Physician: Gonzalo Perez MD  CSN: [id number]     Location:  OR  MRN: [id number]    Date of Birth: [birth year]

## 2022-01-13 NOTE — ANESTHESIA PREPROCEDURE EVALUATION
Anesthesia PreOp Note    HPI:     Danielito Zhao is a 80year old male who presents for preoperative consultation requested by: Lorri Harris MD    Date of Surgery: 1/13/2022    Procedure(s):  cystoscopy, green light laser ablation of prostate  Indic • CATH DRUG ELUTING STENT      Not sure what type   • COLONOSCOPY N/A 2/5/2020    Procedure: COLONOSCOPY;  Surgeon: Erika Vegas MD;  Location: [address] [place] [address] ENDOSCOPY   • EGD  02/04/2020   • HAND/FINGER SURGERY UNLISTED      Right index finger skin g Katrina Brunner, MD  0.9% NaCl infusion, , Intravenous, Continuous, Katrina Brunner, MD, Last Rate: 20 mL/hr at 01/13/22 0754, New Bag at 01/13/22 0754  cefTRIAXone Sodium (ROCEPHIN) 1 g in sodium chloride 0.9% 100 mL IVPB-ADDV, 1 g, Intravenous, Once, file  Transportation Needs: Not on file  Physical Activity: Not on file  Stress: Not on file  Social Connections: Not on file  Intimate Partner Violence: Not on file  Housing Stability: Not on file    Available pre-op labs reviewed.              Vital Signs

## 2022-01-14 VITALS
DIASTOLIC BLOOD PRESSURE: 65 MMHG | HEIGHT: 68 IN | OXYGEN SATURATION: 95 % | BODY MASS INDEX: 26.52 KG/M2 | HEART RATE: 61 BPM | SYSTOLIC BLOOD PRESSURE: 148 MMHG | RESPIRATION RATE: 18 BRPM | WEIGHT: 175 LBS | TEMPERATURE: 98 F

## 2022-01-14 LAB
ANION GAP SERPL CALC-SCNC: 5 MMOL/L (ref 0–18)
BASOPHILS # BLD AUTO: 0.05 X10(3) UL (ref 0–0.2)
BASOPHILS NFR BLD AUTO: 0.3 %
BUN BLD-MCNC: 14 MG/DL (ref 7–18)
BUN/CREAT SERPL: 17.3 (ref 10–20)
CALCIUM BLD-MCNC: 8.4 MG/DL (ref 8.5–10.1)
CHLORIDE SERPL-SCNC: 106 MMOL/L (ref 98–112)
CO2 SERPL-SCNC: 29 MMOL/L (ref 21–32)
CREAT BLD-MCNC: 0.81 MG/DL
DEPRECATED RDW RBC AUTO: 44.6 FL (ref 35.1–46.3)
EOSINOPHIL # BLD AUTO: 0.05 X10(3) UL (ref 0–0.7)
EOSINOPHIL NFR BLD AUTO: 0.3 %
ERYTHROCYTE [DISTWIDTH] IN BLOOD BY AUTOMATED COUNT: 14.5 % (ref 11–15)
GLUCOSE BLD-MCNC: 100 MG/DL (ref 70–99)
GLUCOSE BLDC GLUCOMTR-MCNC: 88 MG/DL (ref 70–99)
GLUCOSE BLDC GLUCOMTR-MCNC: 93 MG/DL (ref 70–99)
HCT VFR BLD AUTO: 39.2 %
HGB BLD-MCNC: 12.4 G/DL
IMM GRANULOCYTES # BLD AUTO: 0.04 X10(3) UL (ref 0–1)
IMM GRANULOCYTES NFR BLD: 0.3 %
LYMPHOCYTES # BLD AUTO: 1.52 X10(3) UL (ref 1–4)
LYMPHOCYTES NFR BLD AUTO: 10.4 %
MCH RBC QN AUTO: 26.9 PG (ref 26–34)
MCHC RBC AUTO-ENTMCNC: 31.6 G/DL (ref 31–37)
MCV RBC AUTO: 85 FL
MONOCYTES # BLD AUTO: 1.16 X10(3) UL (ref 0.1–1)
MONOCYTES NFR BLD AUTO: 7.9 %
NEUTROPHILS # BLD AUTO: 11.86 X10 (3) UL (ref 1.5–7.7)
NEUTROPHILS # BLD AUTO: 11.86 X10(3) UL (ref 1.5–7.7)
NEUTROPHILS NFR BLD AUTO: 80.8 %
OSMOLALITY SERPL CALC.SUM OF ELEC: 291 MOSM/KG (ref 275–295)
PLATELET # BLD AUTO: 199 10(3)UL (ref 150–450)
POTASSIUM SERPL-SCNC: 3.7 MMOL/L (ref 3.5–5.1)
RBC # BLD AUTO: 4.61 X10(6)UL
SODIUM SERPL-SCNC: 140 MMOL/L (ref 136–145)
WBC # BLD AUTO: 14.7 X10(3) UL (ref 4–11)

## 2022-01-14 PROCEDURE — 99214 OFFICE O/P EST MOD 30 MIN: CPT | Performed by: HOSPITALIST

## 2022-01-14 RX ORDER — HYDROCODONE BITARTRATE AND ACETAMINOPHEN 5; 325 MG/1; MG/1
1 TABLET ORAL EVERY 4 HOURS PRN
Status: DISCONTINUED | OUTPATIENT
Start: 2022-01-14 | End: 2022-01-14

## 2022-01-14 RX ORDER — ACETAMINOPHEN 325 MG/1
650 TABLET ORAL EVERY 6 HOURS PRN
Status: DISCONTINUED | OUTPATIENT
Start: 2022-01-14 | End: 2022-01-14

## 2022-01-14 RX ORDER — ACETAMINOPHEN 325 MG/1
650 TABLET ORAL EVERY 4 HOURS PRN
Status: DISCONTINUED | OUTPATIENT
Start: 2022-01-14 | End: 2022-01-14

## 2022-01-14 RX ORDER — METOCLOPRAMIDE HYDROCHLORIDE 5 MG/ML
10 INJECTION INTRAMUSCULAR; INTRAVENOUS EVERY 8 HOURS PRN
Status: DISCONTINUED | OUTPATIENT
Start: 2022-01-14 | End: 2022-01-14

## 2022-01-14 RX ORDER — HYDROCODONE BITARTRATE AND ACETAMINOPHEN 5; 325 MG/1; MG/1
2 TABLET ORAL EVERY 4 HOURS PRN
Status: DISCONTINUED | OUTPATIENT
Start: 2022-01-14 | End: 2022-01-14

## 2022-01-14 RX ORDER — ONDANSETRON 2 MG/ML
4 INJECTION INTRAMUSCULAR; INTRAVENOUS EVERY 6 HOURS PRN
Status: DISCONTINUED | OUTPATIENT
Start: 2022-01-14 | End: 2022-01-14

## 2022-01-14 NOTE — PROGRESS NOTES
[facility]    Progress Note    Jose Ayala Patient Status:  Hospital Outpatient Surgery    1940 MRN [id number]   Location [facility] PRE OP RECOVERY Attending Chapito Rosado MD   Hosp Day # 0 PCP Beatriz Urena DO 03/16/2021    .0 03/16/2021    CREATSERUM 1.01 08/18/2021    BUN 21 (H) 08/18/2021     08/18/2021    K 4.3 08/18/2021     08/18/2021    CO2 32.0 08/18/2021    GLU 95 08/18/2021    CA 9.0 08/18/2021    ALB 3.9 07/09/2019    ALKPHO 98 07/0

## 2022-01-14 NOTE — PLAN OF CARE
Patient is resting in bed, A&O x4, VSS, and denies pain or nausea. Tolerating general diet, accu-checks ACHS. IVF infusing. CBI in place and running slow, urine is light pink to yellow in the tubing, patient is tolerating well.  Bed is in lowest position, s Interventions:  - Assess ability to void  - Assess for bladder distention  - Monitor creatinine and BUN  - Monitor intake and output  - Insert urinary catheter as ordered  - Obtain appropriate imaging as ordered  Outcome: Progressing

## 2022-01-14 NOTE — PROGRESS NOTES
Sharlene Easton is a 80year old male. HPI:   No chief complaint on file. History provided by patient and chart review.     1/13/2022 cystoscopy with greenlight laser vaporization of obstructing, hemorrhagic BPH; it was performed with aspirin only b drinks      Comment: quit in 2007    Drug use: No       Medications :    Current Facility-Administered Medications:   •  acetaminophen (TYLENOL) tab 650 mg, 650 mg, Oral, Q6H PRN  •  acetaminophen (TYLENOL) tab 650 mg, 650 mg, Oral, Q4H PRN **OR** HYDROcod Urinalysis Results (last three years):  Recent Labs     08/26/19  0857 02/04/20  1255   COLORUR Yellow Yellow   CLARITY Clear Clear   SPECGRAVITY 1.011 1.010   PHURINE 6.0 6.0   PROUR Negative Negative   GLUUR Negative Negative   KETUR Negative Negat 6 hours for severe pain not relieved by plain Tylenol ;  this medication can constipate rapidly so if you take it, take some over-the-counter milk of magnesia with it, either 1 teaspoon or 1 tablespoon if urine is bloody     4.   For mild or mild to medium before dinner; do not take at bedtime because that can lead to waking up more often at night to urinate        13.    When you see the nurse the day after surgery, please schedule office appointment to see urology nurse practitioner LINDA Wayne

## 2022-01-14 NOTE — OPERATIVE REPORT
[facility]    PATIENT'S NAME: Maynor MacdonaldSpanaway PHYSICIAN: Pramod Navarro MD   OPERATING PHYSICIAN: Pramod Navarro MD   PATIENT ACCOUNT#:   [id number]    LOCATION:  [address] [place] [address] #:   [id number]       [address] [place] [address] incontinence complication from this procedure, the vaporization was performed up to the mid level of the verumontanum.   In general, this prostate was considerably hemorrhagic throughout the procedure, starting from the very beginning just with cystoscopy b and I started laser ablation of the median lobe at 80 matias and I completed it.   Then as I moved to the bladder neck, I increased the matias to 100 matias and treated that circumferentially and then as I moved distally away from the bladder neck, I increased up to the mid verumontanum.     Dictated By Nato Garcia MD  d: 01/13/2022 11:11:38  t: 01/13/2022 18:43:23  [facility] [id number]  PVK/    cc: Amanda Tavares DO

## 2022-01-14 NOTE — TELEPHONE ENCOUNTER
Spoke with pt and pt describes urine as a bhargavi/pink color. Denies pain at this time. States he had some back pain last night but this morning no pain. I was unaware that pt was still hospitalized prior to calling him.  Nevertheless PVK notified of condit

## 2022-01-14 NOTE — PROGRESS NOTES
[facility] HOSP - [facility]    Progress Note    Martin Irby Patient Status:  Hospital Outpatient Surgery    1940 MRN [id number]   Location [facility] PRE OP RECOVERY Attending Nayeli Neely MD   Hosp Day # 0 PCP Alejandro Wade DO 12.4 (L) 01/14/2022    HCT 39.2 01/14/2022    .0 01/14/2022    CREATSERUM 0.81 01/14/2022    BUN 14 01/14/2022     01/14/2022    K 3.7 01/14/2022     01/14/2022    CO2 29.0 01/14/2022     (H) 01/14/2022    CA 8.4 (L) 01/14/2022

## 2022-01-14 NOTE — PLAN OF CARE
Pt alert and oriented x4. VSS. On room air. Tolerating diet, accucheks achs. Mendoza in place, immobilizer removed and stat lock placed by surgeon in AM. CBI throughout day, clamped due to clear output per order. Tolerated clamp trial. No clots noted.  Mark Goods optimal urinary function  Description: Interventions:  - Assess ability to void  - Assess for bladder distention  - Monitor creatinine and BUN  - Monitor intake and output  - Insert urinary catheter as ordered  - Obtain appropriate imaging as ordered  Outc

## 2022-01-17 ENCOUNTER — TELEPHONE (OUTPATIENT)
Dept: SURGERY | Facility: CLINIC | Age: 82
End: 2022-01-17

## 2022-01-17 ENCOUNTER — NURSE ONLY (OUTPATIENT)
Dept: SURGERY | Facility: CLINIC | Age: 82
End: 2022-01-17
Payer: COMMERCIAL

## 2022-01-17 VITALS — HEART RATE: 69 BPM | SYSTOLIC BLOOD PRESSURE: 149 MMHG | DIASTOLIC BLOOD PRESSURE: 63 MMHG | RESPIRATION RATE: 16 BRPM

## 2022-01-17 DIAGNOSIS — R33.9 URINARY RETENTION: Primary | ICD-10-CM

## 2022-01-17 PROCEDURE — 51798 US URINE CAPACITY MEASURE: CPT | Performed by: UROLOGY

## 2022-01-17 PROCEDURE — 3077F SYST BP >= 140 MM HG: CPT | Performed by: UROLOGY

## 2022-01-17 PROCEDURE — 3078F DIAST BP <80 MM HG: CPT | Performed by: UROLOGY

## 2022-01-17 NOTE — TELEPHONE ENCOUNTER
Pt. states that he just had surgery and that Dr Brown told the pt. that he wants to see him in the office today to remove the catheter. Can pt. Added to the sched today? First avail appt. Is 3/1/2022 with Dr Donna Crum.

## 2022-01-17 NOTE — PROGRESS NOTES
Pt came back into office this afternoon at [phone] for bladder scan post morales removal and decreased urine output. Bladder scan showed 100 ml. Results given to [facility]. [facility] spoke with pt in room.  Pt to follow up with [facility] tomorrow morning at 1130 to recheck rhonda

## 2022-01-17 NOTE — TELEPHONE ENCOUNTER
Pt called to inform of his urinary status and he stated that he went shopping with his spouse after he left our office late this morning and he only drank 1 glas of fluid mixed with the metamucil that I suggested he get for his constipation issues.  He stat

## 2022-01-17 NOTE — PROGRESS NOTES
I called the pt into the exam room and introduced myself and verified the spelling of his last name and his . I explained that [address] gave orders to conduct a voiding trial /decath and I explained the procedure and pt agreed to proceed.  I took pt's VS's an risks and I suggested a daily stool softener or daily fiber laxative and I wrote down some names of a few for him to purchase.  I asked pt to measure his output when he gets home and to not force fluids since if he stops urinating he wont get bladder fullne

## 2022-01-17 NOTE — TELEPHONE ENCOUNTER
Pt came in for bladder scan this afternoon (see added nurse visit note) and bladder scan was 100 ml. Srikanth Dale notified and pt is to follow-up again in office tomorrow 1/18/22 at 1130 with [facility].

## 2022-01-18 ENCOUNTER — OFFICE VISIT (OUTPATIENT)
Dept: SURGERY | Facility: CLINIC | Age: 82
End: 2022-01-18
Payer: COMMERCIAL

## 2022-01-18 ENCOUNTER — TELEPHONE (OUTPATIENT)
Dept: SURGERY | Facility: CLINIC | Age: 82
End: 2022-01-18

## 2022-01-18 DIAGNOSIS — R33.9 URINARY RETENTION: Primary | ICD-10-CM

## 2022-01-18 DIAGNOSIS — N40.1 BPH WITH URINARY OBSTRUCTION: ICD-10-CM

## 2022-01-18 DIAGNOSIS — N13.8 BPH WITH URINARY OBSTRUCTION: ICD-10-CM

## 2022-01-18 PROCEDURE — 51798 US URINE CAPACITY MEASURE: CPT | Performed by: NURSE PRACTITIONER

## 2022-01-18 PROCEDURE — 99024 POSTOP FOLLOW-UP VISIT: CPT | Performed by: NURSE PRACTITIONER

## 2022-01-18 PROCEDURE — 1111F DSCHRG MED/CURRENT MED MERGE: CPT | Performed by: NURSE PRACTITIONER

## 2022-01-18 NOTE — TELEPHONE ENCOUNTER
Per pt his follow up appointment today was canceled and is rescheduled for 2/1. Per pt he had prostate surgery and asking if it is ok push his follow up 2 weeks out.  Please advise

## 2022-01-18 NOTE — PATIENT INSTRUCTIONS
If your urine remains clear yellow, no blood, you can resume aspirin on Thursday. PLease take every other day for 1 week. If urine remains clear after 1 week you can resume aspirin once daily.      If you are feeling good, no blood in urine, no pain, and

## 2022-01-18 NOTE — PROGRESS NOTES
Subjective:     Patient ID: Qing Piper is a 80year old male. HPI     Patient is a 80year old male who presents to the clinic for a follow up.   Patient with a history of very large BPH and chronic voiding difficulties who is s/p cystoscopy with [facility] mouth daily. (Patient taking differently: Take 5 mg by mouth every morning.) 90 tablet 3   • atorvastatin 80 MG Oral Tab Take 1 tablet (80 mg total) by mouth daily.  (Patient taking differently: Take 80 mg by mouth nightly.) 90 tablet 2   • metoprolol tartr Drug use: No       Objective:   Physical Exam  HENT:      Head: Normocephalic. Eyes:      Conjunctiva/sclera: Conjunctivae normal.   Pulmonary:      Effort: Pulmonary effort is normal. No respiratory distress.    Abdominal:      General: There is no diste not bloody, you can drink normal amounts of liquids.     Avoid    NSAIDs (medications such as Advil, Motrin, Aleve, ibuprofen) for 10 days after procedure or longer if urine is bloody or pink .     No heavy lifting or strenuous physical activity for 2.5--3

## 2022-02-04 ENCOUNTER — OFFICE VISIT (OUTPATIENT)
Dept: FAMILY MEDICINE CLINIC | Facility: CLINIC | Age: 82
End: 2022-02-04
Payer: COMMERCIAL

## 2022-02-04 ENCOUNTER — LAB ENCOUNTER (OUTPATIENT)
Dept: LAB | Age: 82
End: 2022-02-04
Attending: FAMILY MEDICINE
Payer: COMMERCIAL

## 2022-02-04 VITALS
WEIGHT: 172.38 LBS | HEIGHT: 68 IN | BODY MASS INDEX: 26.13 KG/M2 | DIASTOLIC BLOOD PRESSURE: 79 MMHG | TEMPERATURE: 97 F | SYSTOLIC BLOOD PRESSURE: 136 MMHG | HEART RATE: 79 BPM

## 2022-02-04 DIAGNOSIS — R35.0 BENIGN PROSTATIC HYPERPLASIA WITH URINARY FREQUENCY: Primary | ICD-10-CM

## 2022-02-04 DIAGNOSIS — N40.1 BENIGN PROSTATIC HYPERPLASIA WITH URINARY FREQUENCY: Primary | ICD-10-CM

## 2022-02-04 DIAGNOSIS — R73.09 ELEVATED HEMOGLOBIN A1C: ICD-10-CM

## 2022-02-04 LAB
EST. AVERAGE GLUCOSE BLD GHB EST-MCNC: 143 MG/DL (ref 68–126)
HBA1C MFR BLD: 6.6 % (ref ?–5.7)

## 2022-02-04 PROCEDURE — 3078F DIAST BP <80 MM HG: CPT | Performed by: FAMILY MEDICINE

## 2022-02-04 PROCEDURE — 99214 OFFICE O/P EST MOD 30 MIN: CPT | Performed by: FAMILY MEDICINE

## 2022-02-04 PROCEDURE — 3008F BODY MASS INDEX DOCD: CPT | Performed by: FAMILY MEDICINE

## 2022-02-04 PROCEDURE — 83036 HEMOGLOBIN GLYCOSYLATED A1C: CPT

## 2022-02-04 PROCEDURE — 36415 COLL VENOUS BLD VENIPUNCTURE: CPT

## 2022-02-04 PROCEDURE — 3075F SYST BP GE 130 - 139MM HG: CPT | Performed by: FAMILY MEDICINE

## 2022-02-10 ENCOUNTER — OFFICE VISIT (OUTPATIENT)
Dept: SURGERY | Facility: CLINIC | Age: 82
End: 2022-02-10
Payer: COMMERCIAL

## 2022-02-10 VITALS
HEART RATE: 59 BPM | HEIGHT: 68 IN | WEIGHT: 172 LBS | BODY MASS INDEX: 26.07 KG/M2 | DIASTOLIC BLOOD PRESSURE: 71 MMHG | SYSTOLIC BLOOD PRESSURE: 138 MMHG

## 2022-02-10 DIAGNOSIS — N40.1 BENIGN PROSTATIC HYPERPLASIA WITH LOWER URINARY TRACT SYMPTOMS, SYMPTOM DETAILS UNSPECIFIED: Primary | ICD-10-CM

## 2022-02-10 PROCEDURE — 3075F SYST BP GE 130 - 139MM HG: CPT | Performed by: NURSE PRACTITIONER

## 2022-02-10 PROCEDURE — 3078F DIAST BP <80 MM HG: CPT | Performed by: NURSE PRACTITIONER

## 2022-02-10 PROCEDURE — 3008F BODY MASS INDEX DOCD: CPT | Performed by: NURSE PRACTITIONER

## 2022-02-10 PROCEDURE — 99024 POSTOP FOLLOW-UP VISIT: CPT | Performed by: NURSE PRACTITIONER

## 2022-04-25 NOTE — TELEPHONE ENCOUNTER
Please review. Protocol failed / No protocol.   Requested Prescriptions   Pending Prescriptions Disp Refills    FINASTERIDE 5 MG Oral Tab [Pharmacy Med Name: FINASTERIDE 5 MG TABLET] 90 tablet 2     Sig: TAKE 1 TABLET BY MOUTH EVERY DAY        There is no refill protocol information for this order          Recent Outpatient Visits              2 months ago Benign prostatic hyperplasia with lower urinary tract symptoms, symptom details unspecified    [facility], [address], Christine [employer], Cherene Kayser, APRN    Office Visit    2 months ago Benign prostatic hyperplasia with urinary frequency    [facility], [facility], [address], [address], [place], DO    Office Visit    3 months ago Urinary retention    [facility], [address], [employer], Cherene Kayser, APRN    Office Visit    3 months ago Urinary retention    [facility], [address], Maximilian    Nurse Only    6 months ago Benign prostatic hyperplasia with incomplete bladder emptying    [facility], [address], Joe Smith MD    Office Visit          Future Appointments         Provider Department Appt Notes    In 1 month [facility], [address] [facility], [facility] follow up    In 3 months Katelyn Briggs MD [facility] Dermatology yearly body ck

## 2022-04-26 RX ORDER — FINASTERIDE 5 MG/1
5 TABLET, FILM COATED ORAL DAILY
Qty: 90 TABLET | Refills: 1 | Status: SHIPPED | OUTPATIENT
Start: 2022-04-26

## 2022-05-14 DIAGNOSIS — I10 ESSENTIAL HYPERTENSION, BENIGN: ICD-10-CM

## 2022-05-15 RX ORDER — AMLODIPINE BESYLATE 5 MG/1
5 TABLET ORAL DAILY
Qty: 90 TABLET | Refills: 1 | Status: SHIPPED | OUTPATIENT
Start: 2022-05-15 | End: 2022-12-10

## 2022-05-15 NOTE — TELEPHONE ENCOUNTER
Refill passed per [facility], [facility] protocol.     Requested Prescriptions   Pending Prescriptions Disp Refills    AMLODIPINE 5 MG Oral Tab [Pharmacy Med Name: AMLODIPINE BESYLATE 5 MG TAB] 90 tablet 3     Sig: TAKE 1 TABLET BY MOUTH EVERY DAY        Hypertensive Medications Protocol Passed - 5/14/2022  3:16 PM        Passed - CMP or BMP in past 12 months        Passed - Appointment in past 6 or next 3 months        Passed - GFR Non- > 50     Lab Results   Component Value Date    GFRNAA 83 01/14/2022                       Recent Outpatient Visits              3 months ago Benign prostatic hyperplasia with lower urinary tract symptoms, symptom details unspecified    [facility], [address], [employer], LINDA Rose    Office Visit    3 months ago Benign prostatic hyperplasia with urinary frequency    [facility], [facility], [address], [address], [place], DO    Office Visit    3 months ago Urinary retention    [facility], [address], Christine [employer], LINDA Rose    Office Visit    3 months ago Urinary retention    [facility], [address], Maximilian    Nurse Only    6 months ago Benign prostatic hyperplasia with incomplete bladder emptying    [facility], [address], Alton Castellanos MD    Office Visit            Future Appointments         Provider Department Appt Notes    In 2 weeks Dami Calderón [facility], [facility] follow up    In 3 months Christy Camilo MD [facility] Derm[facility]ology yearly body ck

## 2022-06-02 ENCOUNTER — OFFICE VISIT (OUTPATIENT)
Dept: SURGERY | Facility: CLINIC | Age: 82
End: 2022-06-02
Payer: COMMERCIAL

## 2022-06-02 DIAGNOSIS — Z87.448 HISTORY OF URETHRAL STRICTURE: ICD-10-CM

## 2022-06-02 DIAGNOSIS — R35.1 NOCTURIA: ICD-10-CM

## 2022-06-02 DIAGNOSIS — N40.1 BENIGN NON-NODULAR PROSTATIC HYPERPLASIA WITH LOWER URINARY TRACT SYMPTOMS: ICD-10-CM

## 2022-06-02 DIAGNOSIS — R97.20 ELEVATED PSA: ICD-10-CM

## 2022-06-02 DIAGNOSIS — Z79.82 LONG TERM CURRENT USE OF ASPIRIN: ICD-10-CM

## 2022-06-02 DIAGNOSIS — N40.1 BPH WITH OBSTRUCTION/LOWER URINARY TRACT SYMPTOMS: Primary | ICD-10-CM

## 2022-06-02 DIAGNOSIS — N39.41 URGE INCONTINENCE: ICD-10-CM

## 2022-06-02 DIAGNOSIS — N13.8 BPH WITH OBSTRUCTION/LOWER URINARY TRACT SYMPTOMS: Primary | ICD-10-CM

## 2022-06-02 PROCEDURE — 99213 OFFICE O/P EST LOW 20 MIN: CPT | Performed by: UROLOGY

## 2022-06-02 RX ORDER — FINASTERIDE 5 MG/1
5 TABLET, FILM COATED ORAL DAILY
Qty: 90 TABLET | Refills: 3 | Status: SHIPPED | OUTPATIENT
Start: 2022-06-02

## 2022-06-02 NOTE — PATIENT INSTRUCTIONS
Pierre Avalos M.D.    1.  Continue finasteride 5 mg daily    2. If you ever start noticing a progressive slow worsening in the strength of urinary stream, please call our office nurses immediately. 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 There is some possibility that that stricture could come back someday and if your stream were to start becoming progressively worse, then there would be a suspicion that the stricture has returned so let us know. 3.  Urine visit in 1 year to see our urology nurse practitioner LINDA Escobar. Please submit urine specimen for complete urinalysis 1--10 days before visit with her.

## 2022-06-11 DIAGNOSIS — I25.10 CORONARY ARTERY DISEASE INVOLVING NATIVE CORONARY ARTERY OF NATIVE HEART WITHOUT ANGINA PECTORIS: ICD-10-CM

## 2022-06-11 NOTE — TELEPHONE ENCOUNTER
Refill passed per [address] protocol. Requested Prescriptions   Pending Prescriptions Disp Refills    METOPROLOL TARTRATE 25 MG Oral Tab [Pharmacy Med Name: METOPROLOL TARTRATE 25 MG TAB] 90 tablet 2     Sig: Take 0.5 tablets (12.5 mg total) by mouth 2 (two) times daily. Hypertensive Medications Protocol Passed - 6/11/2022 12:03 AM        Passed - CMP or BMP in past 12 months        Passed - Appointment in past 6 or next 3 months        Passed - GFR Non- > 50     Lab Results   Component Value Date    GFRNAA 83 01/14/2022                     Recent Outpatient Visits              1 week ago BPH with obstruction/lower urinary tract symptoms    [facility], [address], Maximilian Lopez MD    Office Visit    4 months ago Benign prostatic hyperplasia with lower urinary tract symptoms, symptom details unspecified    [facility], [address], [employer], LINDA Richards    Office Visit    4 months ago Benign prostatic hyperplasia with urinary frequency    [address], [address], [address], [place], DO    Office Visit    4 months ago Urinary retention    [facility], [address], [employer], LINDA Richards    Office Visit    4 months ago Urinary retention    [facility], [address], [place]    Nurse Only          Future Appointments         Provider Department Appt Notes    In 2 months Evan Alvarez MD [facility] Derm[facility]ology yearly body ck    In 12 months B[employer]alBill, [facility], [address], [facility],[address]   Return in about 1 year (around 6/2/2023).

## 2022-07-04 DIAGNOSIS — E78.2 MIXED HYPERLIPIDEMIA: ICD-10-CM

## 2022-07-07 RX ORDER — ATORVASTATIN CALCIUM 80 MG/1
80 TABLET, FILM COATED ORAL NIGHTLY
Qty: 90 TABLET | Refills: 0 | Status: SHIPPED | OUTPATIENT
Start: 2022-07-07 | End: 2022-10-06

## 2022-08-19 ENCOUNTER — OFFICE VISIT (OUTPATIENT)
Dept: DERMATOLOGY CLINIC | Facility: CLINIC | Age: 82
End: 2022-08-19
Payer: COMMERCIAL

## 2022-08-19 DIAGNOSIS — D23.9 BENIGN NEOPLASM OF SKIN, UNSPECIFIED LOCATION: ICD-10-CM

## 2022-08-19 DIAGNOSIS — L81.4 LENTIGO: ICD-10-CM

## 2022-08-19 DIAGNOSIS — L82.1 SEBORRHEIC KERATOSES: ICD-10-CM

## 2022-08-19 DIAGNOSIS — D23.30 BENIGN NEOPLASM OF SKIN OF FACE: ICD-10-CM

## 2022-08-19 DIAGNOSIS — L56.5 DSAP (DISSEMINATED SUPERFICIAL ACTINIC POROKERATOSIS): ICD-10-CM

## 2022-08-19 DIAGNOSIS — D48.5 NEOPLASM OF UNCERTAIN BEHAVIOR OF SKIN: Primary | ICD-10-CM

## 2022-08-19 PROCEDURE — 88305 TISSUE EXAM BY PATHOLOGIST: CPT | Performed by: DERMATOLOGY

## 2022-08-23 NOTE — PROGRESS NOTES
The pathology report from last visit showed right upper back right upper back, appears excised. No further surgery. Please log in test results. Please call patient and inform of results and recommendations.  (please add to history). Pt to  rtc  6-8 mos for skin exam or prn.

## 2022-09-04 NOTE — PROGRESS NOTES
Operative Report                     Shave/  Tangential biopsy     Clinical diagnosis:    Size of lesion:    Location:pt with irregular brown papule on exam  Spec 1 Description >>>>>: right upper back  Spec 1 Comment: r/o atypical nevus 6mm irregular red brown papule with eccentric darker brown macule    Procedure: With patient in appropriate position the skin of the above was scrubbed with alcohol. Anesthesia was obtained with 1% Xylocaine with epinephrine. The skin surrounding the lesion was placed under tension and the lesion was incised using a #15 scalpel blade. The specimen was sent for histopathologic exam.    Hemostasis was obtained with electrocautery/aluminum chloride. Estimated blood loss less than 2 cc. Biopsy dressed with Polysporin, bandage. Pressure dressing:   No    Complications: None    Written instructions given and reviewed with patient    Await pathology    Contact information reviewed.     Procedural physician:  Claudia Fisher MD

## 2022-09-07 ENCOUNTER — NURSE ONLY (OUTPATIENT)
Dept: DERMATOLOGY CLINIC | Facility: CLINIC | Age: 82
End: 2022-09-07
Payer: MEDICARE

## 2022-09-23 DIAGNOSIS — R60.0 BILATERAL LEG EDEMA: ICD-10-CM

## 2022-09-23 DIAGNOSIS — I10 ESSENTIAL HYPERTENSION: ICD-10-CM

## 2022-09-23 NOTE — TELEPHONE ENCOUNTER
Please review. Protocol failed . Requested Prescriptions   Pending Prescriptions Disp Refills    FUROSEMIDE 20 MG Oral Tab [Pharmacy Med Name: FUROSEMIDE 20 MG TABLET] 90 tablet 2     Sig: TAKE 1 TABLET BY MOUTH EVERY DAY WITH DINNER, TAKE THE 40MG TAB IN THE MORNING        Hypertensive Medications Protocol Failed - 9/23/2022  1:17 AM        Failed - CMP or BMP in past 6 months     No results found for this or any previous visit (from the past 4392 hour(s)). Failed - In person appointment or virtual visit in the past 6 months       Recent Outpatient Visits              2 weeks ago     [facility]    Nurse Only    1 month ago Neoplasm of uncertain behavior of skin    [facility] D[facility] Ganga Gutierrez MD    Office Visit    3 months ago BPH with obstruction/lower urinary tract symptoms    [facility], [address], Maximilian Martin MD    Office Visit    7 months ago Benign prostatic hyperplasia with lower urinary tract symptoms, symptom details unspecified    [facility], [address], [facility]N    Office Visit    7 months ago Benign prostatic hyperplasia with urinary frequency    [address] [facility], [address], [address], [place],     Office Visit     Future Appointments         Provider Department Appt Notes    In 5 months Ganga Gutierrez MD [facility] Dermatology 6 mos followup     In 8 months BijalPetty, [facility], [facility],[address], [address] [place] [address],[address]   Return in about 1 year (around 6/2/2023).                Passed - In person appointment in the past 12 or next 3 months       Recent Outpatient Visits              2 weeks ago     [facility] Derm[facility]    Nurse Only    1 month ago Neoplasm of uncertain behavior of skin    [facility] Dermatology Ganga Gutierrez MD    Office Visit    3 months ago BPH with obstruction/lower urinary tract symptoms    [facility], [address], Maximilian Calloway MD    Office Visit    7 months ago Benign prostatic hyperplasia with lower urinary tract symptoms, symptom details unspecified    [facility], [address], [employer], Ovidio Toribio, APRN    Office Visit    7 months ago Benign prostatic hyperplasia with urinary frequency    [facility] [facility], [facility], [address], [place], [place]    Office Visit     Future Appointments         Provider Department Appt Notes    In 5 months Marley Ferguson MD [facility] Dermatology 6 mos followup     In 8 months [facility] Ovidio Toribio, [facility], [address], [address] [employer],[address]   Return in about 1 year (around 6/2/2023). Passed - Last BP reading less than 140/90     BP Readings from Last 1 Encounters:  02/10/22 : 138/71                Passed - EGFRCR or GFRNAA > 50     GFR Evaluation  GFRNAA: 83 , resulted on 1/14/2022                Future Appointments         Provider Department Appt Notes    In 5 months Marley Ferguson MD [facility] Dermatology 6 mos followup     In 8 months BijalOvidio webb, [facility], [address], [address] [employer],[address]   Return in about 1 year (around 6/2/2023).           Recent Outpatient Visits              2 weeks ago     [facility] Derm[facility]    Nurse Only    1 month ago Neoplasm of uncertain behavior of skin    [facility] Derm[facility] Marley Ferguson MD    Office Visit    3 months ago BPH with obstruction/lower urinary tract symptoms    [facility], [address], Rick Calloway MD    Office Visit    7 months ago Benign prostatic hyperplasia with lower urinary tract symptoms, symptom details unspecified    [facility], [address], [employer], Ovidio Toribio, APRN    Office Visit    7 months ago Benign prostatic hyperplasia with urinary frequency    [address], [place], [place]    Office Visit

## 2022-09-24 RX ORDER — FUROSEMIDE 20 MG/1
TABLET ORAL
Qty: 90 TABLET | Refills: 0 | Status: SHIPPED | OUTPATIENT
Start: 2022-09-24 | End: 2022-12-15

## 2022-09-24 NOTE — TELEPHONE ENCOUNTER
No additional refills until pt is seen in office. Please assist pt in making appt.    Needs px and labs

## 2022-09-28 DIAGNOSIS — R60.0 BILATERAL LEG EDEMA: ICD-10-CM

## 2022-09-28 DIAGNOSIS — I10 ESSENTIAL HYPERTENSION: ICD-10-CM

## 2022-09-28 NOTE — TELEPHONE ENCOUNTER
Please review. Protocol failed / No protocol. Requested Prescriptions   Pending Prescriptions Disp Refills    FUROSEMIDE 40 MG Oral Tab [Pharmacy Med Name: FUROSEMIDE 40 MG TABLET] 90 tablet 2     Sig: TAKE 1 TABLET BY MOUTH EVERY DAY, TAKE 20 MG TAB AT DINNER        Hypertensive Medications Protocol Failed - 9/28/2022  3:45 AM        Failed - CMP or BMP in past 6 months     No results found for this or any previous visit (from the past 4392 hour(s)). Failed - In person appointment or virtual visit in the past 6 months       Recent Outpatient Visits              3 weeks ago     [address] [place] [address] Maya [facility] Dermatology    Nurse Only    1 month ago Neoplasm of uncertain behavior of skin    [address] [place] [address] Dermatology Veronika Ricardo MD    Office Visit    3 months ago BPH with obstruction/lower urinary tract symptoms    [facility], [address], [place] Qing De La Rosa MD    Office Visit    7 months ago Benign prostatic hyperplasia with lower urinary tract symptoms, symptom details unspecified    [facility], [address], [facility]    Office Visit    7 months ago Benign prostatic hyperplasia with urinary frequency    [facility] [place], [place], [address], [address], [place],     Office Visit     Future Appointments         Provider Department Appt Notes    In 5 months Veronika Ricardo MD [facility] Adi Trejo [facility] Dermatology 6 mos followup     In 8 months Noemi Appiah, 136 [facility], [address], [address] [place] [address],[address]   Return in about 1 year (around 6/2/2023).                Passed - In person appointment in the past 12 or next 3 months       Recent Outpatient Visits              3 weeks ago     [facility]erm[facility]    Nurse Only    1 month ago Neoplasm of uncertain behavior of skin    [address] [place] [address] Dermatology Veronika Ricardo MD    Office Visit    3 months ago BPH with obstruction/lower urinary tract symptoms    [employer], [address], [place] Mendel Cavalier, MD    Office Visit    7 months ago Benign prostatic hyperplasia with lower urinary tract symptoms, symptom details unspecified    [facility] [employer], [address], [employer], Chata Apt, APRN    Office Visit    7 months ago Benign prostatic hyperplasia with urinary frequency    [facility], [facility], [address], [address], [place], [place]    Office Visit     Future Appointments         Provider Department Appt Notes    In 5 months Lila Chiu MD [facility] Dermatology 6 mos followup     In 8 months BijalChata webb, 136 [facility], [address], [address] [facility],[address]   Return in about 1 year (around 6/2/2023).                Passed - Last BP reading less than 140/90     BP Readings from Last 1 Encounters:  02/10/22 : 138/71                Passed - EGFRCR or GFRNAA > 50     GFR Evaluation  GFRNAA: 83 , resulted on 1/14/2022                  Recent Outpatient Visits              3 weeks ago     [facility] Derm[facility]    Nurse Only    1 month ago Neoplasm of uncertain behavior of skin    [facility] Dermatology Lila Chiu MD    Office Visit    3 months ago BPH with obstruction/lower urinary tract symptoms    [address] [facility] [employer], [address], [place] Mendel Cavalier, MD    Office Visit    7 months ago Benign prostatic hyperplasia with lower urinary tract symptoms, symptom details unspecified    [address] [employer], [address], Christine [employer], Chata Apt, APRN    Office Visit    7 months ago Benign prostatic hyperplasia with urinary frequency    [facility] [employer], [facility], [address], [address], [place], [place]    Office Visit            Future Appointments         Provider Department Appt Notes    In 5 months Lila Chiu MD [facility] Dermatology 6 mos followup     In 8 months Leila Settles, [address] [facility], [address]   Return in about 1 year (around 6/2/2023).

## 2022-09-29 RX ORDER — FUROSEMIDE 40 MG/1
40 TABLET ORAL
Qty: 90 TABLET | Refills: 1 | OUTPATIENT
Start: 2022-09-29

## 2022-10-03 DIAGNOSIS — R60.0 BILATERAL LEG EDEMA: ICD-10-CM

## 2022-10-03 DIAGNOSIS — I10 ESSENTIAL HYPERTENSION: ICD-10-CM

## 2022-10-04 DIAGNOSIS — E78.2 MIXED HYPERLIPIDEMIA: ICD-10-CM

## 2022-10-05 ENCOUNTER — TELEPHONE (OUTPATIENT)
Dept: FAMILY MEDICINE CLINIC | Facility: CLINIC | Age: 82
End: 2022-10-05

## 2022-10-05 NOTE — TELEPHONE ENCOUNTER
Pt on the phone will like a refill on the following medication. Pt is out of medication.  Please advise      furosemide 40 MG Oral Tab

## 2022-10-06 RX ORDER — FUROSEMIDE 40 MG/1
TABLET ORAL
Qty: 90 TABLET | Refills: 0 | Status: SHIPPED | OUTPATIENT
Start: 2022-10-06 | End: 2023-01-01

## 2022-10-06 RX ORDER — ATORVASTATIN CALCIUM 80 MG/1
TABLET, FILM COATED ORAL
Qty: 90 TABLET | Refills: 0 | Status: SHIPPED | OUTPATIENT
Start: 2022-10-06 | End: 2023-01-02

## 2022-10-06 NOTE — TELEPHONE ENCOUNTER
No additional refills until pt is seen in office. Please assist pt in making appt.    Pt needs physical  And labs

## 2022-10-14 ENCOUNTER — TELEPHONE (OUTPATIENT)
Dept: FAMILY MEDICINE CLINIC | Facility: CLINIC | Age: 82
End: 2022-10-14

## 2022-10-14 DIAGNOSIS — Z23 NEED FOR VACCINATION: Primary | ICD-10-CM

## 2022-10-22 ENCOUNTER — NURSE ONLY (OUTPATIENT)
Dept: FAMILY MEDICINE CLINIC | Facility: CLINIC | Age: 82
End: 2022-10-22
Payer: MEDICARE

## 2022-10-22 DIAGNOSIS — Z23 NEED FOR VACCINATION: Primary | ICD-10-CM

## 2022-10-22 NOTE — PROGRESS NOTES
Patient is her for shingles and flu vaccine. Patients full name, , and vaccines verified. Patient tolerated vaccines well. Vis given.

## 2022-12-10 ENCOUNTER — OFFICE VISIT (OUTPATIENT)
Dept: FAMILY MEDICINE CLINIC | Facility: CLINIC | Age: 82
End: 2022-12-10
Payer: COMMERCIAL

## 2022-12-10 VITALS
DIASTOLIC BLOOD PRESSURE: 72 MMHG | HEIGHT: 68 IN | TEMPERATURE: 97 F | BODY MASS INDEX: 26.67 KG/M2 | WEIGHT: 176 LBS | HEART RATE: 61 BPM | SYSTOLIC BLOOD PRESSURE: 158 MMHG

## 2022-12-10 DIAGNOSIS — I10 ESSENTIAL HYPERTENSION, BENIGN: ICD-10-CM

## 2022-12-10 DIAGNOSIS — H57.89 REDNESS OF RIGHT EYE: Primary | ICD-10-CM

## 2022-12-10 PROCEDURE — 3077F SYST BP >= 140 MM HG: CPT | Performed by: FAMILY MEDICINE

## 2022-12-10 PROCEDURE — 3078F DIAST BP <80 MM HG: CPT | Performed by: FAMILY MEDICINE

## 2022-12-10 PROCEDURE — 3008F BODY MASS INDEX DOCD: CPT | Performed by: FAMILY MEDICINE

## 2022-12-10 PROCEDURE — 99214 OFFICE O/P EST MOD 30 MIN: CPT | Performed by: FAMILY MEDICINE

## 2022-12-10 RX ORDER — AMLODIPINE BESYLATE 10 MG/1
10 TABLET ORAL DAILY
Qty: 90 TABLET | Refills: 3 | Status: SHIPPED | OUTPATIENT
Start: 2022-12-10

## 2022-12-15 DIAGNOSIS — I10 ESSENTIAL HYPERTENSION: ICD-10-CM

## 2022-12-15 DIAGNOSIS — R60.0 BILATERAL LEG EDEMA: ICD-10-CM

## 2022-12-15 RX ORDER — FUROSEMIDE 20 MG/1
TABLET ORAL
Qty: 90 TABLET | Refills: 0 | Status: SHIPPED | OUTPATIENT
Start: 2022-12-15

## 2022-12-20 ENCOUNTER — OFFICE VISIT (OUTPATIENT)
Dept: FAMILY MEDICINE CLINIC | Facility: CLINIC | Age: 82
End: 2022-12-20
Payer: COMMERCIAL

## 2022-12-20 ENCOUNTER — LAB ENCOUNTER (OUTPATIENT)
Dept: LAB | Age: 82
End: 2022-12-20
Attending: FAMILY MEDICINE
Payer: COMMERCIAL

## 2022-12-20 VITALS
HEIGHT: 68 IN | HEART RATE: 67 BPM | TEMPERATURE: 97 F | BODY MASS INDEX: 26.67 KG/M2 | WEIGHT: 176 LBS | DIASTOLIC BLOOD PRESSURE: 68 MMHG | SYSTOLIC BLOOD PRESSURE: 137 MMHG

## 2022-12-20 DIAGNOSIS — I10 ESSENTIAL HYPERTENSION: ICD-10-CM

## 2022-12-20 DIAGNOSIS — N40.1 BENIGN NON-NODULAR PROSTATIC HYPERPLASIA WITH LOWER URINARY TRACT SYMPTOMS: ICD-10-CM

## 2022-12-20 DIAGNOSIS — I25.10 CORONARY ARTERY DISEASE INVOLVING NATIVE CORONARY ARTERY OF NATIVE HEART WITHOUT ANGINA PECTORIS: ICD-10-CM

## 2022-12-20 DIAGNOSIS — Z00.00 ADULT GENERAL MEDICAL EXAM: ICD-10-CM

## 2022-12-20 DIAGNOSIS — E11.9 CONTROLLED TYPE 2 DIABETES MELLITUS WITHOUT COMPLICATION, WITHOUT LONG-TERM CURRENT USE OF INSULIN (HCC): ICD-10-CM

## 2022-12-20 DIAGNOSIS — E78.2 MIXED HYPERLIPIDEMIA: ICD-10-CM

## 2022-12-20 DIAGNOSIS — Z00.00 ADULT GENERAL MEDICAL EXAM: Primary | ICD-10-CM

## 2022-12-20 LAB
ALBUMIN SERPL-MCNC: 3.7 G/DL (ref 3.4–5)
ALBUMIN/GLOB SERPL: 0.9 {RATIO} (ref 1–2)
ALP LIVER SERPL-CCNC: 122 U/L
ALT SERPL-CCNC: 38 U/L
ANION GAP SERPL CALC-SCNC: 5 MMOL/L (ref 0–18)
AST SERPL-CCNC: 21 U/L (ref 15–37)
BASOPHILS # BLD AUTO: 0.07 X10(3) UL (ref 0–0.2)
BASOPHILS NFR BLD AUTO: 0.6 %
BILIRUB SERPL-MCNC: 0.8 MG/DL (ref 0.1–2)
BUN BLD-MCNC: 14 MG/DL (ref 7–18)
BUN/CREAT SERPL: 12.7 (ref 10–20)
CALCIUM BLD-MCNC: 9.4 MG/DL (ref 8.5–10.1)
CHLORIDE SERPL-SCNC: 105 MMOL/L (ref 98–112)
CHOLEST SERPL-MCNC: 92 MG/DL (ref ?–200)
CO2 SERPL-SCNC: 30 MMOL/L (ref 21–32)
COMPLEXED PSA SERPL-MCNC: 2.74 NG/ML (ref ?–4)
CREAT BLD-MCNC: 1.1 MG/DL
CREAT UR-SCNC: 278 MG/DL
DEPRECATED RDW RBC AUTO: 48 FL (ref 35.1–46.3)
EOSINOPHIL # BLD AUTO: 0.38 X10(3) UL (ref 0–0.7)
EOSINOPHIL NFR BLD AUTO: 3.1 %
ERYTHROCYTE [DISTWIDTH] IN BLOOD BY AUTOMATED COUNT: 15.5 % (ref 11–15)
EST. AVERAGE GLUCOSE BLD GHB EST-MCNC: 148 MG/DL (ref 68–126)
FASTING PATIENT LIPID ANSWER: YES
FASTING STATUS PATIENT QL REPORTED: YES
GFR SERPLBLD BASED ON 1.73 SQ M-ARVRAT: 67 ML/MIN/1.73M2 (ref 60–?)
GLOBULIN PLAS-MCNC: 4.3 G/DL (ref 2.8–4.4)
GLUCOSE BLD-MCNC: 111 MG/DL (ref 70–99)
HBA1C MFR BLD: 6.8 % (ref ?–5.7)
HCT VFR BLD AUTO: 46.4 %
HDLC SERPL-MCNC: 46 MG/DL (ref 40–59)
HGB BLD-MCNC: 14.2 G/DL
IMM GRANULOCYTES # BLD AUTO: 0.03 X10(3) UL (ref 0–1)
IMM GRANULOCYTES NFR BLD: 0.2 %
LDLC SERPL CALC-MCNC: 31 MG/DL (ref ?–100)
LYMPHOCYTES # BLD AUTO: 1.56 X10(3) UL (ref 1–4)
LYMPHOCYTES NFR BLD AUTO: 12.8 %
MCH RBC QN AUTO: 26.2 PG (ref 26–34)
MCHC RBC AUTO-ENTMCNC: 30.6 G/DL (ref 31–37)
MCV RBC AUTO: 85.5 FL
MICROALBUMIN UR-MCNC: 155 MG/DL
MICROALBUMIN/CREAT 24H UR-RTO: 557.6 UG/MG (ref ?–30)
MONOCYTES # BLD AUTO: 1 X10(3) UL (ref 0.1–1)
MONOCYTES NFR BLD AUTO: 8.2 %
NEUTROPHILS # BLD AUTO: 9.18 X10 (3) UL (ref 1.5–7.7)
NEUTROPHILS # BLD AUTO: 9.18 X10(3) UL (ref 1.5–7.7)
NEUTROPHILS NFR BLD AUTO: 75.1 %
NONHDLC SERPL-MCNC: 46 MG/DL (ref ?–130)
OSMOLALITY SERPL CALC.SUM OF ELEC: 291 MOSM/KG (ref 275–295)
PLATELET # BLD AUTO: 202 10(3)UL (ref 150–450)
POTASSIUM SERPL-SCNC: 4.5 MMOL/L (ref 3.5–5.1)
PROT SERPL-MCNC: 8 G/DL (ref 6.4–8.2)
RBC # BLD AUTO: 5.43 X10(6)UL
SODIUM SERPL-SCNC: 140 MMOL/L (ref 136–145)
TRIGL SERPL-MCNC: 68 MG/DL (ref 30–149)
TSI SER-ACNC: 1.35 MIU/ML (ref 0.36–3.74)
VLDLC SERPL CALC-MCNC: 9 MG/DL (ref 0–30)
WBC # BLD AUTO: 12.2 X10(3) UL (ref 4–11)

## 2022-12-20 PROCEDURE — 3008F BODY MASS INDEX DOCD: CPT | Performed by: FAMILY MEDICINE

## 2022-12-20 PROCEDURE — 84443 ASSAY THYROID STIM HORMONE: CPT

## 2022-12-20 PROCEDURE — 80053 COMPREHEN METABOLIC PANEL: CPT

## 2022-12-20 PROCEDURE — 36415 COLL VENOUS BLD VENIPUNCTURE: CPT

## 2022-12-20 PROCEDURE — 99397 PER PM REEVAL EST PAT 65+ YR: CPT | Performed by: FAMILY MEDICINE

## 2022-12-20 PROCEDURE — 3078F DIAST BP <80 MM HG: CPT | Performed by: FAMILY MEDICINE

## 2022-12-20 PROCEDURE — 82043 UR ALBUMIN QUANTITATIVE: CPT

## 2022-12-20 PROCEDURE — 83036 HEMOGLOBIN GLYCOSYLATED A1C: CPT

## 2022-12-20 PROCEDURE — 82570 ASSAY OF URINE CREATININE: CPT

## 2022-12-20 PROCEDURE — 85025 COMPLETE CBC W/AUTO DIFF WBC: CPT

## 2022-12-20 PROCEDURE — 3075F SYST BP GE 130 - 139MM HG: CPT | Performed by: FAMILY MEDICINE

## 2022-12-20 PROCEDURE — 80061 LIPID PANEL: CPT

## 2023-01-06 NOTE — PROGRESS NOTES
[employer] message sent. no rashes , no suspicious lesions , no areas of discoloration , no jaundice present , good turgor , no masses , no tenderness on palpation

## 2023-01-10 ENCOUNTER — TELEPHONE (OUTPATIENT)
Dept: FAMILY MEDICINE CLINIC | Facility: CLINIC | Age: 83
End: 2023-01-10

## 2023-01-10 NOTE — TELEPHONE ENCOUNTER
Per patient, her was previously taking amlodipine 10mg. The new prescription given to the patient by the pharmacy is for 5mg. Patient did purchase the medication. Patient is not sure which does he should be taking.  Please advise

## 2023-01-10 NOTE — TELEPHONE ENCOUNTER
Spoke with pt,  verified  Pt stated he is currently taking amlodipine 10 mg every day and rx ref from yesterday was 5 mg. Per office notes on 12-10-22, amlodipine 5 mg was increased to 10 mg every day. Pt was advised to stay on 10 mg every day as MD recommended. Pt stated understanding. Med list updated. FYI         LOV 22  Essential hypertension  Blood pressure beautifully controlled, continue present management. OV 12-10-22  Essential hypertension, benign  -     amLODIPine 10 MG Oral Tab; Take 1 tablet (10 mg total) by mouth daily. For blood pressure.   Increase amlodipine to 10 mg as blood pressure clearly not controlled and he has an appointment with me in 10 days for a physical.

## 2023-02-08 ENCOUNTER — NURSE TRIAGE (OUTPATIENT)
Dept: FAMILY MEDICINE CLINIC | Facility: CLINIC | Age: 83
End: 2023-02-08

## 2023-02-11 ENCOUNTER — OFFICE VISIT (OUTPATIENT)
Dept: FAMILY MEDICINE CLINIC | Facility: CLINIC | Age: 83
End: 2023-02-11

## 2023-02-11 VITALS
BODY MASS INDEX: 26.83 KG/M2 | HEIGHT: 68 IN | HEART RATE: 67 BPM | WEIGHT: 177 LBS | SYSTOLIC BLOOD PRESSURE: 143 MMHG | TEMPERATURE: 98 F | DIASTOLIC BLOOD PRESSURE: 70 MMHG

## 2023-02-11 DIAGNOSIS — S60.122A CONTUSION OF LEFT INDEX FINGER WITH DAMAGE TO NAIL, INITIAL ENCOUNTER: ICD-10-CM

## 2023-02-11 DIAGNOSIS — S60.122A SUBUNGUAL HEMATOMA OF LEFT INDEX FINGER: Primary | ICD-10-CM

## 2023-02-18 ENCOUNTER — APPOINTMENT (OUTPATIENT)
Dept: GENERAL RADIOLOGY | Age: 83
End: 2023-02-18
Attending: NURSE PRACTITIONER
Payer: COMMERCIAL

## 2023-02-18 ENCOUNTER — HOSPITAL ENCOUNTER (OUTPATIENT)
Age: 83
Discharge: HOME | End: 2023-02-18
Payer: COMMERCIAL

## 2023-02-18 VITALS
DIASTOLIC BLOOD PRESSURE: 76 MMHG | RESPIRATION RATE: 18 BRPM | TEMPERATURE: 98 F | HEART RATE: 71 BPM | OXYGEN SATURATION: 95 % | BODY MASS INDEX: 26.52 KG/M2 | SYSTOLIC BLOOD PRESSURE: 158 MMHG | HEIGHT: 68 IN | WEIGHT: 175 LBS

## 2023-02-18 DIAGNOSIS — M54.50 ACUTE BILATERAL LOW BACK PAIN WITHOUT SCIATICA: Primary | ICD-10-CM

## 2023-02-18 PROCEDURE — 72100 X-RAY EXAM L-S SPINE 2/3 VWS: CPT | Performed by: NURSE PRACTITIONER

## 2023-02-18 PROCEDURE — 99213 OFFICE O/P EST LOW 20 MIN: CPT | Performed by: NURSE PRACTITIONER

## 2023-02-18 NOTE — DISCHARGE INSTRUCTIONS
Tylenol 650 mg every 6 hours as needed for pain  Lidocaine patches (Aspercreme), change 3-4 times day  Heating pad, 10 minutes at a time a few times per day, do not do this directly over lidocaine patches  If you develop worsening back pain, chest pain, shortness of breath or any new or worsening symptoms, please go to the emergency room  Please follow up with your doctor in 2 days

## 2023-03-02 DIAGNOSIS — I25.10 CORONARY ARTERY DISEASE INVOLVING NATIVE CORONARY ARTERY OF NATIVE HEART WITHOUT ANGINA PECTORIS: ICD-10-CM

## 2023-03-02 NOTE — TELEPHONE ENCOUNTER
Please review; Protocol Failed/ no protocol. Rx Pended, authorize if appropriate    Future Appointments   Date Time Provider Terri High   3/10/2023 10:15 AM Jeff De La Fuente MD [facility]   6/6/2023 10:00 AM BijalAlexLINDA CCFHURO EC [facility]       Requested Prescriptions   Pending Prescriptions Disp Refills    METOPROLOL TARTRATE 25 MG Oral Tab [Pharmacy Med Name: METOPROLOL TARTRATE 25 MG TAB] 90 tablet 3     Sig: TAKE 1/2 TABLET BY MOUTH TWICE A DAY       Hypertensive Medications Protocol Failed - 3/2/2023 12:05 AM        Failed - Last BP reading less than 140/90     BP Readings from Last 1 Encounters:  02/18/23 : 158/76              Passed - In person appointment in the past 12 or next 3 months     Recent Outpatient Visits              2 weeks ago Subungual hematoma of left index finger    [address] [place] [address] Elly Olszewski, DO    Office Visit    2 months ago Adult general medical exam    [address] [place] [address] [address], [place], DO    Office Visit    2 months ago Redness of right eye    [address] [place] [address], [place], DO    Office Visit    4 months ago Need for vaccination    [address] [place] [address]    Nurse Only    5 months ago     [address], [facility], [facility]    Nurse Only          Future Appointments         Provider Department Appt Notes    In 1 week Jeff De La Fuente MD [address], [facility], [place] 6 mos followup     In 3 months LINDA Perez [address], [facility],[address], [address] [facility]   Return in about 1 year (around 6/2/2023).                Passed - CMP or BMP in past 6 months     Recent Results (from the past 4392 hour(s))   COMP METABOLIC PANEL (14)    Collection Time: 12/20/22  8:39 AM   Result Value Ref Range    Glucose 111 (H) 70 - 99 mg/dL    Sodium 140 136 - 145 mmol/L Potassium 4.5 3.5 - 5.1 mmol/L    Chloride 105 98 - 112 mmol/L    CO2 30.0 21.0 - 32.0 mmol/L    Anion Gap 5 0 - 18 mmol/L    BUN 14 7 - 18 mg/dL    Creatinine 1.10 0.70 - 1.30 mg/dL    BUN/CREA Ratio 12.7 10.0 - 20.0    Calcium, Total 9.4 8.5 - 10.1 mg/dL    Calculated Osmolality 291 275 - 295 mOsm/kg    eGFR-Cr 67 >=60 mL/min/1.73m2    ALT 38 16 - 61 U/L    AST 21 15 - 37 U/L    Alkaline Phosphatase 122 (H) 45 - 117 U/L    Bilirubin, Total 0.8 0.1 - 2.0 mg/dL    Total Protein 8.0 6.4 - 8.2 g/dL    Albumin 3.7 3.4 - 5.0 g/dL    Globulin  4.3 2.8 - 4.4 g/dL    A/G Ratio 0.9 (L) 1.0 - 2.0    Patient Fasting for CMP? Yes      *Note: Due to a large number of results and/or encounters for the requested time period, some results have not been displayed. A complete set of results can be found in Results Review. Passed - In person appointment or virtual visit in the past 6 months     Recent Outpatient Visits              2 weeks ago Subungual hematoma of left index finger    [facility] Yo, [address], [place], DO    Office Visit    2 months ago Adult general medical exam    [facility] Yo, [address], [place], DO    Office Visit    2 months ago Redness of right eye    [facility] Yo, [address], [place], DO    Office Visit    4 months ago Need for vaccination    Darin Ram, [place]    Nurse Only    5 months ago     [facility] [place]    Nurse Only          Future Appointments         Provider Department Appt Notes    In 1 week MD Kate Briones Carie Carrier, [place] 6 mos followup     In 3 months LINDA Pavon, [facility] [facility],[address], [place] [facility]K   Return in about 1 year (around 6/2/2023).                Passed - EGFRCR or GFRNAA > 50     GFR Evaluation  EGFRCR: 67 , resulted on 12/20/2022

## 2023-03-25 ENCOUNTER — LAB ENCOUNTER (OUTPATIENT)
Dept: LAB | Age: 83
End: 2023-03-25
Attending: FAMILY MEDICINE
Payer: COMMERCIAL

## 2023-03-25 ENCOUNTER — OFFICE VISIT (OUTPATIENT)
Dept: FAMILY MEDICINE CLINIC | Facility: CLINIC | Age: 83
End: 2023-03-25

## 2023-03-25 VITALS
SYSTOLIC BLOOD PRESSURE: 128 MMHG | DIASTOLIC BLOOD PRESSURE: 74 MMHG | HEART RATE: 69 BPM | HEIGHT: 68 IN | BODY MASS INDEX: 26.83 KG/M2 | WEIGHT: 177 LBS | TEMPERATURE: 98 F

## 2023-03-25 DIAGNOSIS — I10 ESSENTIAL HYPERTENSION, BENIGN: ICD-10-CM

## 2023-03-25 DIAGNOSIS — R73.9 HYPERGLYCEMIA: Primary | ICD-10-CM

## 2023-03-25 DIAGNOSIS — R60.0 BILATERAL LEG EDEMA: ICD-10-CM

## 2023-03-25 DIAGNOSIS — I10 ESSENTIAL HYPERTENSION: ICD-10-CM

## 2023-03-25 DIAGNOSIS — I25.10 CORONARY ARTERY DISEASE INVOLVING NATIVE CORONARY ARTERY OF NATIVE HEART WITHOUT ANGINA PECTORIS: ICD-10-CM

## 2023-03-25 DIAGNOSIS — R73.9 HYPERGLYCEMIA: ICD-10-CM

## 2023-03-25 DIAGNOSIS — R35.1 NOCTURIA: ICD-10-CM

## 2023-03-25 DIAGNOSIS — N40.1 BENIGN NON-NODULAR PROSTATIC HYPERPLASIA WITH LOWER URINARY TRACT SYMPTOMS: ICD-10-CM

## 2023-03-25 DIAGNOSIS — E78.2 MIXED HYPERLIPIDEMIA: ICD-10-CM

## 2023-03-25 DIAGNOSIS — R97.20 ELEVATED PSA: ICD-10-CM

## 2023-03-25 LAB
ANION GAP SERPL CALC-SCNC: 5 MMOL/L (ref 0–18)
BASOPHILS # BLD AUTO: 0.05 X10(3) UL (ref 0–0.2)
BASOPHILS NFR BLD AUTO: 0.5 %
BUN BLD-MCNC: 19 MG/DL (ref 7–18)
BUN/CREAT SERPL: 20.4 (ref 10–20)
CALCIUM BLD-MCNC: 9 MG/DL (ref 8.5–10.1)
CHLORIDE SERPL-SCNC: 108 MMOL/L (ref 98–112)
CO2 SERPL-SCNC: 29 MMOL/L (ref 21–32)
CREAT BLD-MCNC: 0.93 MG/DL
DEPRECATED RDW RBC AUTO: 47.3 FL (ref 35.1–46.3)
EOSINOPHIL # BLD AUTO: 0.44 X10(3) UL (ref 0–0.7)
EOSINOPHIL NFR BLD AUTO: 4.4 %
ERYTHROCYTE [DISTWIDTH] IN BLOOD BY AUTOMATED COUNT: 15 % (ref 11–15)
EST. AVERAGE GLUCOSE BLD GHB EST-MCNC: 143 MG/DL (ref 68–126)
FASTING STATUS PATIENT QL REPORTED: YES
GFR SERPLBLD BASED ON 1.73 SQ M-ARVRAT: 82 ML/MIN/1.73M2 (ref 60–?)
GLUCOSE BLD-MCNC: 98 MG/DL (ref 70–99)
HBA1C MFR BLD: 6.6 % (ref ?–5.7)
HCT VFR BLD AUTO: 40.6 %
HGB BLD-MCNC: 12.9 G/DL
IMM GRANULOCYTES # BLD AUTO: 0.02 X10(3) UL (ref 0–1)
IMM GRANULOCYTES NFR BLD: 0.2 %
LYMPHOCYTES # BLD AUTO: 1.57 X10(3) UL (ref 1–4)
LYMPHOCYTES NFR BLD AUTO: 15.6 %
MCH RBC QN AUTO: 27.2 PG (ref 26–34)
MCHC RBC AUTO-ENTMCNC: 31.8 G/DL (ref 31–37)
MCV RBC AUTO: 85.5 FL
MONOCYTES # BLD AUTO: 0.86 X10(3) UL (ref 0.1–1)
MONOCYTES NFR BLD AUTO: 8.5 %
NEUTROPHILS # BLD AUTO: 7.14 X10 (3) UL (ref 1.5–7.7)
NEUTROPHILS # BLD AUTO: 7.14 X10(3) UL (ref 1.5–7.7)
NEUTROPHILS NFR BLD AUTO: 70.8 %
OSMOLALITY SERPL CALC.SUM OF ELEC: 296 MOSM/KG (ref 275–295)
PLATELET # BLD AUTO: 257 10(3)UL (ref 150–450)
POTASSIUM SERPL-SCNC: 4.1 MMOL/L (ref 3.5–5.1)
RBC # BLD AUTO: 4.75 X10(6)UL
SODIUM SERPL-SCNC: 142 MMOL/L (ref 136–145)
WBC # BLD AUTO: 10.1 X10(3) UL (ref 4–11)

## 2023-03-25 PROCEDURE — 3074F SYST BP LT 130 MM HG: CPT | Performed by: FAMILY MEDICINE

## 2023-03-25 PROCEDURE — 99214 OFFICE O/P EST MOD 30 MIN: CPT | Performed by: FAMILY MEDICINE

## 2023-03-25 PROCEDURE — 85025 COMPLETE CBC W/AUTO DIFF WBC: CPT

## 2023-03-25 PROCEDURE — 83036 HEMOGLOBIN GLYCOSYLATED A1C: CPT

## 2023-03-25 PROCEDURE — 3078F DIAST BP <80 MM HG: CPT | Performed by: FAMILY MEDICINE

## 2023-03-25 PROCEDURE — 80048 BASIC METABOLIC PNL TOTAL CA: CPT

## 2023-03-25 PROCEDURE — 36415 COLL VENOUS BLD VENIPUNCTURE: CPT

## 2023-03-25 PROCEDURE — 3008F BODY MASS INDEX DOCD: CPT | Performed by: FAMILY MEDICINE

## 2023-03-25 RX ORDER — AMLODIPINE BESYLATE 10 MG/1
10 TABLET ORAL DAILY
Qty: 90 TABLET | Refills: 2 | Status: SHIPPED | OUTPATIENT
Start: 2023-03-25

## 2023-03-25 RX ORDER — FUROSEMIDE 20 MG/1
20 TABLET ORAL
Qty: 90 TABLET | Refills: 2 | Status: SHIPPED | OUTPATIENT
Start: 2023-03-25

## 2023-03-25 RX ORDER — ATORVASTATIN CALCIUM 80 MG/1
80 TABLET, FILM COATED ORAL NIGHTLY
Qty: 90 TABLET | Refills: 2 | Status: SHIPPED | OUTPATIENT
Start: 2023-03-25

## 2023-03-25 RX ORDER — TAMSULOSIN HYDROCHLORIDE 0.4 MG/1
0.4 CAPSULE ORAL DAILY
Qty: 90 CAPSULE | Refills: 2 | Status: SHIPPED | OUTPATIENT
Start: 2023-03-25

## 2023-03-25 RX ORDER — FINASTERIDE 5 MG/1
5 TABLET, FILM COATED ORAL DAILY
Qty: 90 TABLET | Refills: 2 | Status: SHIPPED | OUTPATIENT
Start: 2023-03-25

## 2023-03-25 RX ORDER — FUROSEMIDE 40 MG/1
40 TABLET ORAL DAILY
Qty: 90 TABLET | Refills: 2 | Status: SHIPPED | OUTPATIENT
Start: 2023-03-25

## 2023-04-23 DIAGNOSIS — I10 ESSENTIAL HYPERTENSION, BENIGN: ICD-10-CM

## 2023-04-23 RX ORDER — AMLODIPINE BESYLATE 5 MG/1
5 TABLET ORAL DAILY
Qty: 90 TABLET | Refills: 1 | OUTPATIENT
Start: 2023-04-23

## 2023-05-31 ENCOUNTER — APPOINTMENT (OUTPATIENT)
Dept: ULTRASOUND IMAGING | Facility: HOSPITAL | Age: 83
End: 2023-05-31
Attending: EMERGENCY MEDICINE
Payer: COMMERCIAL

## 2023-05-31 ENCOUNTER — HOSPITAL ENCOUNTER (EMERGENCY)
Facility: HOSPITAL | Age: 83
Discharge: HOME | End: 2023-06-01
Attending: EMERGENCY MEDICINE
Payer: COMMERCIAL

## 2023-05-31 ENCOUNTER — APPOINTMENT (OUTPATIENT)
Dept: GENERAL RADIOLOGY | Facility: HOSPITAL | Age: 83
End: 2023-05-31
Attending: EMERGENCY MEDICINE
Payer: COMMERCIAL

## 2023-05-31 ENCOUNTER — NURSE TRIAGE (OUTPATIENT)
Dept: FAMILY MEDICINE CLINIC | Facility: CLINIC | Age: 83
End: 2023-05-31

## 2023-05-31 ENCOUNTER — HOSPITAL ENCOUNTER (OUTPATIENT)
Age: 83
Discharge: ANOTHER HOSPITAL | End: 2023-05-31
Payer: COMMERCIAL

## 2023-05-31 VITALS
OXYGEN SATURATION: 95 % | HEART RATE: 81 BPM | SYSTOLIC BLOOD PRESSURE: 161 MMHG | DIASTOLIC BLOOD PRESSURE: 75 MMHG | TEMPERATURE: 98 F | RESPIRATION RATE: 18 BRPM

## 2023-05-31 DIAGNOSIS — R60.0 LOWER EXTREMITY EDEMA: Primary | ICD-10-CM

## 2023-05-31 DIAGNOSIS — R60.0 BILATERAL LOWER EXTREMITY EDEMA: Primary | ICD-10-CM

## 2023-05-31 DIAGNOSIS — R03.0 ELEVATED BLOOD PRESSURE READING: ICD-10-CM

## 2023-05-31 LAB
ANION GAP SERPL CALC-SCNC: 4 MMOL/L (ref 0–18)
BASOPHILS # BLD AUTO: 0.06 X10(3) UL (ref 0–0.2)
BASOPHILS NFR BLD AUTO: 0.6 %
BUN BLD-MCNC: 21 MG/DL (ref 7–18)
BUN/CREAT SERPL: 16.2 (ref 10–20)
CALCIUM BLD-MCNC: 8.8 MG/DL (ref 8.5–10.1)
CHLORIDE SERPL-SCNC: 106 MMOL/L (ref 98–112)
CO2 SERPL-SCNC: 29 MMOL/L (ref 21–32)
CREAT BLD-MCNC: 1.3 MG/DL
DEPRECATED RDW RBC AUTO: 42.7 FL (ref 35.1–46.3)
EOSINOPHIL # BLD AUTO: 0.39 X10(3) UL (ref 0–0.7)
EOSINOPHIL NFR BLD AUTO: 3.8 %
ERYTHROCYTE [DISTWIDTH] IN BLOOD BY AUTOMATED COUNT: 14.2 % (ref 11–15)
GFR SERPLBLD BASED ON 1.73 SQ M-ARVRAT: 55 ML/MIN/1.73M2 (ref 60–?)
GLUCOSE BLD-MCNC: 140 MG/DL (ref 70–99)
HCT VFR BLD AUTO: 40.9 %
HGB BLD-MCNC: 13.1 G/DL
IMM GRANULOCYTES # BLD AUTO: 0.03 X10(3) UL (ref 0–1)
IMM GRANULOCYTES NFR BLD: 0.3 %
LYMPHOCYTES # BLD AUTO: 1.75 X10(3) UL (ref 1–4)
LYMPHOCYTES NFR BLD AUTO: 17.3 %
MCH RBC QN AUTO: 26.5 PG (ref 26–34)
MCHC RBC AUTO-ENTMCNC: 32 G/DL (ref 31–37)
MCV RBC AUTO: 82.8 FL
MONOCYTES # BLD AUTO: 0.95 X10(3) UL (ref 0.1–1)
MONOCYTES NFR BLD AUTO: 9.4 %
NEUTROPHILS # BLD AUTO: 6.96 X10 (3) UL (ref 1.5–7.7)
NEUTROPHILS # BLD AUTO: 6.96 X10(3) UL (ref 1.5–7.7)
NEUTROPHILS NFR BLD AUTO: 68.6 %
NT-PROBNP SERPL-MCNC: 287 PG/ML (ref ?–450)
OSMOLALITY SERPL CALC.SUM OF ELEC: 293 MOSM/KG (ref 275–295)
PLATELET # BLD AUTO: 254 10(3)UL (ref 150–450)
POTASSIUM SERPL-SCNC: 3.8 MMOL/L (ref 3.5–5.1)
RBC # BLD AUTO: 4.94 X10(6)UL
SODIUM SERPL-SCNC: 139 MMOL/L (ref 136–145)
TROPONIN I HIGH SENSITIVITY: 18 NG/L
WBC # BLD AUTO: 10.1 X10(3) UL (ref 4–11)

## 2023-05-31 PROCEDURE — 99284 EMERGENCY DEPT VISIT MOD MDM: CPT

## 2023-05-31 PROCEDURE — 93005 ELECTROCARDIOGRAM TRACING: CPT

## 2023-05-31 PROCEDURE — 99285 EMERGENCY DEPT VISIT HI MDM: CPT

## 2023-05-31 PROCEDURE — 96374 THER/PROPH/DIAG INJ IV PUSH: CPT

## 2023-05-31 PROCEDURE — 85025 COMPLETE CBC W/AUTO DIFF WBC: CPT | Performed by: EMERGENCY MEDICINE

## 2023-05-31 PROCEDURE — 99214 OFFICE O/P EST MOD 30 MIN: CPT | Performed by: PHYSICIAN ASSISTANT

## 2023-05-31 PROCEDURE — 80048 BASIC METABOLIC PNL TOTAL CA: CPT | Performed by: EMERGENCY MEDICINE

## 2023-05-31 PROCEDURE — 71045 X-RAY EXAM CHEST 1 VIEW: CPT | Performed by: EMERGENCY MEDICINE

## 2023-05-31 PROCEDURE — 83880 ASSAY OF NATRIURETIC PEPTIDE: CPT | Performed by: EMERGENCY MEDICINE

## 2023-05-31 PROCEDURE — 84484 ASSAY OF TROPONIN QUANT: CPT | Performed by: EMERGENCY MEDICINE

## 2023-05-31 PROCEDURE — 93010 ELECTROCARDIOGRAM REPORT: CPT

## 2023-05-31 PROCEDURE — 93970 EXTREMITY STUDY: CPT | Performed by: EMERGENCY MEDICINE

## 2023-05-31 RX ORDER — FUROSEMIDE 10 MG/ML
40 INJECTION INTRAMUSCULAR; INTRAVENOUS ONCE
Status: COMPLETED | OUTPATIENT
Start: 2023-05-31 | End: 2023-05-31

## 2023-05-31 NOTE — ED QUICK NOTES
Per provider recommendation pt will go to [address] [place] [address] ED for further evaluation of symptoms. Leaving IC stable no acute distress.

## 2023-06-01 VITALS
DIASTOLIC BLOOD PRESSURE: 68 MMHG | RESPIRATION RATE: 22 BRPM | HEIGHT: 68.5 IN | HEART RATE: 68 BPM | BODY MASS INDEX: 25.47 KG/M2 | OXYGEN SATURATION: 93 % | SYSTOLIC BLOOD PRESSURE: 153 MMHG | TEMPERATURE: 98 F | WEIGHT: 170 LBS

## 2023-06-01 LAB
ATRIAL RATE: 76 BPM
P AXIS: 29 DEGREES
P-R INTERVAL: 178 MS
Q-T INTERVAL: 402 MS
QRS DURATION: 94 MS
QTC CALCULATION (BEZET): 452 MS
R AXIS: 58 DEGREES
T AXIS: 72 DEGREES
VENTRICULAR RATE: 76 BPM

## 2023-06-01 NOTE — DISCHARGE INSTRUCTIONS
Report to emergency department immediately      You had elevated blood pressure today and you need to follow-up with your doctor for repeat blood pressure check  If possible check your blood pressure at home and keep a blood pressure log to bring to your physician

## 2023-06-01 NOTE — ED INITIAL ASSESSMENT (HPI)
BL lower leg swelling for the past 2x weeks; new. Swollen to touch, pulse intact, cms intact. Skin WNL.

## 2023-06-01 NOTE — ED PROVIDER NOTES
Pt endorsed to me by Dr. Dean Dawson for continuation of care - US negative for DVT, pt to be discharged home. BILATERAL LOWER EXTREMITY DUPLEX ULTRASOUND:  IMPRESSION    No evidence of deep venous thrombosis. Normal compression, augmentation, and blood flow. Mild subcutaneous edema    Case faxed/finalized at 1:13 AM eastern time . If there are any questions please contact me at [phone]. Luis Angel Bautista M.D. This report has been electronically signed and verified by the Radiologist whose name is printed above.     DD:  05/31/2023/DT:  06/01/2023

## 2023-06-06 ENCOUNTER — OFFICE VISIT (OUTPATIENT)
Dept: SURGERY | Facility: CLINIC | Age: 83
End: 2023-06-06

## 2023-06-06 VITALS — SYSTOLIC BLOOD PRESSURE: 139 MMHG | DIASTOLIC BLOOD PRESSURE: 68 MMHG | HEART RATE: 67 BPM

## 2023-06-06 DIAGNOSIS — N40.1 BENIGN PROSTATIC HYPERPLASIA WITH NOCTURIA: Primary | ICD-10-CM

## 2023-06-06 DIAGNOSIS — R35.1 BENIGN PROSTATIC HYPERPLASIA WITH NOCTURIA: Primary | ICD-10-CM

## 2023-06-06 DIAGNOSIS — Z87.448 HISTORY OF URETHRAL STRICTURE: ICD-10-CM

## 2023-06-06 DIAGNOSIS — N39.41 URGE INCONTINENCE: ICD-10-CM

## 2023-06-06 DIAGNOSIS — R97.20 ELEVATED PSA: ICD-10-CM

## 2023-06-06 PROCEDURE — 51798 US URINE CAPACITY MEASURE: CPT | Performed by: NURSE PRACTITIONER

## 2023-06-06 PROCEDURE — 99213 OFFICE O/P EST LOW 20 MIN: CPT | Performed by: NURSE PRACTITIONER

## 2023-06-06 PROCEDURE — 3075F SYST BP GE 130 - 139MM HG: CPT | Performed by: NURSE PRACTITIONER

## 2023-06-06 PROCEDURE — 3078F DIAST BP <80 MM HG: CPT | Performed by: NURSE PRACTITIONER

## 2023-06-06 RX ORDER — FINASTERIDE 5 MG/1
5 TABLET, FILM COATED ORAL DAILY
Qty: 90 TABLET | Refills: 3 | Status: SHIPPED | OUTPATIENT
Start: 2023-06-06

## 2023-06-06 RX ORDER — TAMSULOSIN HYDROCHLORIDE 0.4 MG/1
0.4 CAPSULE ORAL DAILY
Qty: 90 CAPSULE | Refills: 3 | Status: SHIPPED | OUTPATIENT
Start: 2023-06-06

## 2023-06-08 ENCOUNTER — OFFICE VISIT (OUTPATIENT)
Dept: FAMILY MEDICINE CLINIC | Facility: CLINIC | Age: 83
End: 2023-06-08

## 2023-06-08 VITALS
BODY MASS INDEX: 27.42 KG/M2 | DIASTOLIC BLOOD PRESSURE: 60 MMHG | HEART RATE: 68 BPM | TEMPERATURE: 97 F | SYSTOLIC BLOOD PRESSURE: 120 MMHG | HEIGHT: 68.5 IN | WEIGHT: 183 LBS

## 2023-06-08 DIAGNOSIS — R60.0 BILATERAL LEG EDEMA: Primary | ICD-10-CM

## 2023-06-08 DIAGNOSIS — I10 ESSENTIAL HYPERTENSION, BENIGN: ICD-10-CM

## 2023-06-08 PROCEDURE — 3078F DIAST BP <80 MM HG: CPT | Performed by: FAMILY MEDICINE

## 2023-06-08 PROCEDURE — 99214 OFFICE O/P EST MOD 30 MIN: CPT | Performed by: FAMILY MEDICINE

## 2023-06-08 PROCEDURE — 3008F BODY MASS INDEX DOCD: CPT | Performed by: FAMILY MEDICINE

## 2023-06-08 PROCEDURE — 3074F SYST BP LT 130 MM HG: CPT | Performed by: FAMILY MEDICINE

## 2023-06-08 RX ORDER — SPIRONOLACTONE 25 MG/1
25 TABLET ORAL DAILY
Qty: 90 TABLET | Refills: 0 | Status: SHIPPED | OUTPATIENT
Start: 2023-06-08 | End: 2024-06-02

## 2023-06-08 RX ORDER — AMLODIPINE BESYLATE 5 MG/1
5 TABLET ORAL DAILY
Qty: 90 TABLET | Refills: 1 | Status: SHIPPED | OUTPATIENT
Start: 2023-06-08

## 2023-06-08 NOTE — PATIENT INSTRUCTIONS
Due to his edema being 3+ I kept him on the furosemide 40 mg in the morning and 20 mg at dinner. I decreased his Norvasc from 10 mg to 5 mg as that may be causing some of the worsening of the edema. I also placed him on Aldactone 25 mg daily for blood pressure control and also to help with edema.

## 2023-06-09 ENCOUNTER — OFFICE VISIT (OUTPATIENT)
Dept: DERMATOLOGY CLINIC | Facility: CLINIC | Age: 83
End: 2023-06-09

## 2023-06-09 DIAGNOSIS — D23.4 BENIGN NEOPLASM OF SCALP AND SKIN OF NECK: ICD-10-CM

## 2023-06-09 DIAGNOSIS — D23.30 BENIGN NEOPLASM OF SKIN OF FACE: ICD-10-CM

## 2023-06-09 DIAGNOSIS — L81.4 LENTIGO: ICD-10-CM

## 2023-06-09 DIAGNOSIS — L82.1 SEBORRHEIC KERATOSES: Primary | ICD-10-CM

## 2023-06-09 DIAGNOSIS — D23.60 BENIGN NEOPLASM OF SKIN OF UPPER LIMB, INCLUDING SHOULDER, UNSPECIFIED LATERALITY: ICD-10-CM

## 2023-06-09 DIAGNOSIS — D23.5 BENIGN NEOPLASM OF SKIN OF TRUNK: ICD-10-CM

## 2023-06-09 DIAGNOSIS — L56.5 DSAP (DISSEMINATED SUPERFICIAL ACTINIC POROKERATOSIS): ICD-10-CM

## 2023-06-09 DIAGNOSIS — Z86.018 HISTORY OF DYSPLASTIC NEVUS: ICD-10-CM

## 2023-06-09 PROCEDURE — 99213 OFFICE O/P EST LOW 20 MIN: CPT | Performed by: DERMATOLOGY

## 2023-06-17 ENCOUNTER — LAB ENCOUNTER (OUTPATIENT)
Dept: LAB | Facility: HOSPITAL | Age: 83
End: 2023-06-17
Attending: INTERNAL MEDICINE
Payer: COMMERCIAL

## 2023-06-17 DIAGNOSIS — R06.09 DOE (DYSPNEA ON EXERTION): Primary | ICD-10-CM

## 2023-06-17 DIAGNOSIS — R60.0 BILATERAL LEG EDEMA: ICD-10-CM

## 2023-06-17 DIAGNOSIS — I10 ESSENTIAL HYPERTENSION, BENIGN: ICD-10-CM

## 2023-06-17 LAB
ANION GAP SERPL CALC-SCNC: 7 MMOL/L (ref 0–18)
BUN BLD-MCNC: 20 MG/DL (ref 7–18)
BUN/CREAT SERPL: 20.6 (ref 10–20)
CALCIUM BLD-MCNC: 8.9 MG/DL (ref 8.5–10.1)
CHLORIDE SERPL-SCNC: 107 MMOL/L (ref 98–112)
CO2 SERPL-SCNC: 26 MMOL/L (ref 21–32)
CREAT BLD-MCNC: 0.97 MG/DL
FASTING STATUS PATIENT QL REPORTED: YES
GFR SERPLBLD BASED ON 1.73 SQ M-ARVRAT: 78 ML/MIN/1.73M2 (ref 60–?)
GLUCOSE BLD-MCNC: 113 MG/DL (ref 70–99)
OSMOLALITY SERPL CALC.SUM OF ELEC: 293 MOSM/KG (ref 275–295)
POTASSIUM SERPL-SCNC: 4.3 MMOL/L (ref 3.5–5.1)
SODIUM SERPL-SCNC: 140 MMOL/L (ref 136–145)

## 2023-06-17 PROCEDURE — 36415 COLL VENOUS BLD VENIPUNCTURE: CPT

## 2023-06-17 PROCEDURE — 80048 BASIC METABOLIC PNL TOTAL CA: CPT

## 2023-06-18 RX ORDER — SPIRONOLACTONE 25 MG/1
25 TABLET ORAL DAILY
Qty: 90 TABLET | Refills: 0 | OUTPATIENT
Start: 2023-06-18 | End: 2024-06-12

## 2023-06-29 ENCOUNTER — OFFICE VISIT (OUTPATIENT)
Dept: FAMILY MEDICINE CLINIC | Facility: CLINIC | Age: 83
End: 2023-06-29

## 2023-06-29 ENCOUNTER — LAB ENCOUNTER (OUTPATIENT)
Dept: LAB | Age: 83
End: 2023-06-29
Attending: FAMILY MEDICINE
Payer: COMMERCIAL

## 2023-06-29 VITALS
DIASTOLIC BLOOD PRESSURE: 60 MMHG | BODY MASS INDEX: 25.77 KG/M2 | TEMPERATURE: 97 F | HEART RATE: 69 BPM | SYSTOLIC BLOOD PRESSURE: 132 MMHG | WEIGHT: 172 LBS | HEIGHT: 68.5 IN

## 2023-06-29 DIAGNOSIS — I10 ESSENTIAL HYPERTENSION, BENIGN: ICD-10-CM

## 2023-06-29 DIAGNOSIS — R60.0 BILATERAL LEG EDEMA: ICD-10-CM

## 2023-06-29 DIAGNOSIS — R60.0 BILATERAL LEG EDEMA: Primary | ICD-10-CM

## 2023-06-29 LAB
ANION GAP SERPL CALC-SCNC: 2 MMOL/L (ref 0–18)
BUN BLD-MCNC: 23 MG/DL (ref 7–18)
CALCIUM BLD-MCNC: 8.9 MG/DL (ref 8.5–10.1)
CHLORIDE SERPL-SCNC: 107 MMOL/L (ref 98–112)
CO2 SERPL-SCNC: 28 MMOL/L (ref 21–32)
CREAT BLD-MCNC: 0.98 MG/DL
FASTING STATUS PATIENT QL REPORTED: YES
GFR SERPLBLD BASED ON 1.73 SQ M-ARVRAT: 77 ML/MIN/1.73M2 (ref 60–?)
GLUCOSE BLD-MCNC: 108 MG/DL (ref 70–99)
OSMOLALITY SERPL CALC.SUM OF ELEC: 288 MOSM/KG (ref 275–295)
POTASSIUM SERPL-SCNC: 4.3 MMOL/L (ref 3.5–5.1)
SODIUM SERPL-SCNC: 137 MMOL/L (ref 136–145)

## 2023-06-29 PROCEDURE — 80048 BASIC METABOLIC PNL TOTAL CA: CPT

## 2023-06-29 PROCEDURE — 3008F BODY MASS INDEX DOCD: CPT | Performed by: FAMILY MEDICINE

## 2023-06-29 PROCEDURE — 36415 COLL VENOUS BLD VENIPUNCTURE: CPT

## 2023-06-29 PROCEDURE — 3075F SYST BP GE 130 - 139MM HG: CPT | Performed by: FAMILY MEDICINE

## 2023-06-29 PROCEDURE — 3078F DIAST BP <80 MM HG: CPT | Performed by: FAMILY MEDICINE

## 2023-06-29 PROCEDURE — 99214 OFFICE O/P EST MOD 30 MIN: CPT | Performed by: FAMILY MEDICINE

## 2023-06-29 RX ORDER — SPIRONOLACTONE 25 MG/1
25 TABLET ORAL 2 TIMES DAILY
Qty: 180 TABLET | Refills: 0 | Status: SHIPPED | OUTPATIENT
Start: 2023-06-29 | End: 2024-06-23

## 2023-07-10 ENCOUNTER — HOSPITAL ENCOUNTER (OUTPATIENT)
Dept: GENERAL RADIOLOGY | Facility: HOSPITAL | Age: 83
Discharge: HOME | End: 2023-07-10
Attending: INTERNAL MEDICINE
Payer: COMMERCIAL

## 2023-07-10 ENCOUNTER — LAB ENCOUNTER (OUTPATIENT)
Dept: LAB | Facility: HOSPITAL | Age: 83
End: 2023-07-10
Attending: INTERNAL MEDICINE
Payer: COMMERCIAL

## 2023-07-10 DIAGNOSIS — Z01.810 PRE-OPERATIVE CARDIOVASCULAR EXAMINATION: Primary | ICD-10-CM

## 2023-07-10 DIAGNOSIS — Z01.818 PRE-OP TESTING: ICD-10-CM

## 2023-07-10 LAB
ANION GAP SERPL CALC-SCNC: 9 MMOL/L (ref 0–18)
BASOPHILS # BLD AUTO: 0.07 X10(3) UL (ref 0–0.2)
BASOPHILS NFR BLD AUTO: 0.6 %
BUN BLD-MCNC: 40 MG/DL (ref 7–18)
BUN/CREAT SERPL: 26.7 (ref 10–20)
CALCIUM BLD-MCNC: 9 MG/DL (ref 8.5–10.1)
CHLORIDE SERPL-SCNC: 106 MMOL/L (ref 98–112)
CO2 SERPL-SCNC: 27 MMOL/L (ref 21–32)
CREAT BLD-MCNC: 1.5 MG/DL
DEPRECATED RDW RBC AUTO: 45.9 FL (ref 35.1–46.3)
EOSINOPHIL # BLD AUTO: 0.49 X10(3) UL (ref 0–0.7)
EOSINOPHIL NFR BLD AUTO: 4.1 %
ERYTHROCYTE [DISTWIDTH] IN BLOOD BY AUTOMATED COUNT: 15.1 % (ref 11–15)
FASTING STATUS PATIENT QL REPORTED: NO
GFR SERPLBLD BASED ON 1.73 SQ M-ARVRAT: 46 ML/MIN/1.73M2 (ref 60–?)
GLUCOSE BLD-MCNC: 86 MG/DL (ref 70–99)
HCT VFR BLD AUTO: 42.3 %
HGB BLD-MCNC: 13.4 G/DL
IMM GRANULOCYTES # BLD AUTO: 0.06 X10(3) UL (ref 0–1)
IMM GRANULOCYTES NFR BLD: 0.5 %
INR BLD: 1.09 (ref 0.85–1.16)
LYMPHOCYTES # BLD AUTO: 1.97 X10(3) UL (ref 1–4)
LYMPHOCYTES NFR BLD AUTO: 16.6 %
MCH RBC QN AUTO: 26.3 PG (ref 26–34)
MCHC RBC AUTO-ENTMCNC: 31.7 G/DL (ref 31–37)
MCV RBC AUTO: 82.9 FL
MONOCYTES # BLD AUTO: 1.11 X10(3) UL (ref 0.1–1)
MONOCYTES NFR BLD AUTO: 9.3 %
NEUTROPHILS # BLD AUTO: 8.18 X10 (3) UL (ref 1.5–7.7)
NEUTROPHILS # BLD AUTO: 8.18 X10(3) UL (ref 1.5–7.7)
NEUTROPHILS NFR BLD AUTO: 68.9 %
OSMOLALITY SERPL CALC.SUM OF ELEC: 303 MOSM/KG (ref 275–295)
PLATELET # BLD AUTO: 269 10(3)UL (ref 150–450)
POTASSIUM SERPL-SCNC: 5.1 MMOL/L (ref 3.5–5.1)
PROTHROMBIN TIME: 14 SECONDS (ref 11.6–14.8)
RBC # BLD AUTO: 5.1 X10(6)UL
SODIUM SERPL-SCNC: 142 MMOL/L (ref 136–145)
WBC # BLD AUTO: 11.9 X10(3) UL (ref 4–11)

## 2023-07-10 PROCEDURE — 85610 PROTHROMBIN TIME: CPT

## 2023-07-10 PROCEDURE — 85025 COMPLETE CBC W/AUTO DIFF WBC: CPT

## 2023-07-10 PROCEDURE — 71046 X-RAY EXAM CHEST 2 VIEWS: CPT | Performed by: INTERNAL MEDICINE

## 2023-07-10 PROCEDURE — 36415 COLL VENOUS BLD VENIPUNCTURE: CPT

## 2023-07-10 PROCEDURE — 80048 BASIC METABOLIC PNL TOTAL CA: CPT

## 2023-07-25 ENCOUNTER — HOSPITAL ENCOUNTER (OUTPATIENT)
Dept: INTERVENTIONAL RADIOLOGY/VASCULAR | Facility: HOSPITAL | Age: 83
Discharge: HOME | End: 2023-07-25
Attending: INTERNAL MEDICINE | Admitting: INTERNAL MEDICINE
Payer: COMMERCIAL

## 2023-07-25 VITALS
OXYGEN SATURATION: 98 % | TEMPERATURE: 98 F | HEIGHT: 68 IN | RESPIRATION RATE: 25 BRPM | HEART RATE: 73 BPM | DIASTOLIC BLOOD PRESSURE: 74 MMHG | BODY MASS INDEX: 25.76 KG/M2 | SYSTOLIC BLOOD PRESSURE: 148 MMHG | WEIGHT: 170 LBS

## 2023-07-25 DIAGNOSIS — Z98.61 CAD S/P PERCUTANEOUS CORONARY ANGIOPLASTY: ICD-10-CM

## 2023-07-25 DIAGNOSIS — I50.30 (HFPEF) HEART FAILURE WITH PRESERVED EJECTION FRACTION (HCC): ICD-10-CM

## 2023-07-25 DIAGNOSIS — R60.0 BILATERAL LEG EDEMA: ICD-10-CM

## 2023-07-25 DIAGNOSIS — I25.10 CAD S/P PERCUTANEOUS CORONARY ANGIOPLASTY: ICD-10-CM

## 2023-07-25 DIAGNOSIS — R94.39 ABNORMAL STRESS TEST: ICD-10-CM

## 2023-07-25 DIAGNOSIS — I10 ESSENTIAL HYPERTENSION, BENIGN: ICD-10-CM

## 2023-07-25 LAB
ISTAT ACTIVATED CLOTTING TIME: 221 SECONDS (ref 125–137)
ISTAT ACTIVATED CLOTTING TIME: 233 SECONDS (ref 125–137)

## 2023-07-25 PROCEDURE — 92921 HC PTCA EA ADDL MAJOR CORONARY ARTERY/BRANCH: CPT | Performed by: INTERNAL MEDICINE

## 2023-07-25 PROCEDURE — B2111ZZ FLUOROSCOPY OF MULTIPLE CORONARY ARTERIES USING LOW OSMOLAR CONTRAST: ICD-10-PCS | Performed by: INTERNAL MEDICINE

## 2023-07-25 PROCEDURE — 02703ZZ DILATION OF CORONARY ARTERY, ONE ARTERY, PERCUTANEOUS APPROACH: ICD-10-PCS | Performed by: INTERNAL MEDICINE

## 2023-07-25 PROCEDURE — 85347 COAGULATION TIME ACTIVATED: CPT

## 2023-07-25 PROCEDURE — 02C03ZZ EXTIRPATION OF MATTER FROM CORONARY ARTERY, ONE ARTERY, PERCUTANEOUS APPROACH: ICD-10-PCS | Performed by: INTERNAL MEDICINE

## 2023-07-25 PROCEDURE — 93458 L HRT ARTERY/VENTRICLE ANGIO: CPT | Performed by: INTERNAL MEDICINE

## 2023-07-25 PROCEDURE — 99153 MOD SED SAME PHYS/QHP EA: CPT | Performed by: INTERNAL MEDICINE

## 2023-07-25 PROCEDURE — 36415 COLL VENOUS BLD VENIPUNCTURE: CPT

## 2023-07-25 PROCEDURE — 99152 MOD SED SAME PHYS/QHP 5/>YRS: CPT | Performed by: INTERNAL MEDICINE

## 2023-07-25 PROCEDURE — 4A023N7 MEASUREMENT OF CARDIAC SAMPLING AND PRESSURE, LEFT HEART, PERCUTANEOUS APPROACH: ICD-10-PCS | Performed by: INTERNAL MEDICINE

## 2023-07-25 PROCEDURE — 027034Z DILATION OF CORONARY ARTERY, ONE ARTERY WITH DRUG-ELUTING INTRALUMINAL DEVICE, PERCUTANEOUS APPROACH: ICD-10-PCS | Performed by: INTERNAL MEDICINE

## 2023-07-25 RX ORDER — HEPARIN SODIUM 1000 [USP'U]/ML
INJECTION, SOLUTION INTRAVENOUS; SUBCUTANEOUS
Status: COMPLETED
Start: 2023-07-25 | End: 2023-07-25

## 2023-07-25 RX ORDER — MIDAZOLAM HYDROCHLORIDE 1 MG/ML
INJECTION INTRAMUSCULAR; INTRAVENOUS
Status: COMPLETED
Start: 2023-07-25 | End: 2023-07-25

## 2023-07-25 RX ORDER — SODIUM CHLORIDE 9 MG/ML
INJECTION, SOLUTION INTRAVENOUS CONTINUOUS
Status: ACTIVE | OUTPATIENT
Start: 2023-07-25 | End: 2023-07-25

## 2023-07-25 RX ORDER — NITROGLYCERIN 20 MG/100ML
INJECTION INTRAVENOUS
Status: COMPLETED
Start: 2023-07-25 | End: 2023-07-25

## 2023-07-25 RX ORDER — ASPIRIN 81 MG/1
81 TABLET ORAL DAILY
Qty: 90 TABLET | Refills: 3 | Status: SHIPPED | OUTPATIENT
Start: 2023-07-26

## 2023-07-25 RX ORDER — SPIRONOLACTONE 25 MG/1
25 TABLET ORAL DAILY
Status: SHIPPED | COMMUNITY
Start: 2023-07-25

## 2023-07-25 RX ORDER — CLOPIDOGREL BISULFATE 75 MG/1
75 TABLET ORAL DAILY
Qty: 90 TABLET | Refills: 3 | Status: SHIPPED | OUTPATIENT
Start: 2023-07-26

## 2023-07-25 RX ORDER — ASPIRIN 81 MG/1
TABLET, CHEWABLE ORAL
Status: DISCONTINUED
Start: 2023-07-25 | End: 2023-07-25

## 2023-07-25 RX ORDER — ASPIRIN 81 MG/1
81 TABLET ORAL DAILY
Status: DISCONTINUED | OUTPATIENT
Start: 2023-07-26 | End: 2023-07-25

## 2023-07-25 RX ORDER — CHLORHEXIDINE GLUCONATE 4 G/100ML
30 SOLUTION TOPICAL
Status: COMPLETED | OUTPATIENT
Start: 2023-07-25 | End: 2023-07-25

## 2023-07-25 RX ORDER — VERAPAMIL HYDROCHLORIDE 2.5 MG/ML
INJECTION, SOLUTION INTRAVENOUS
Status: COMPLETED
Start: 2023-07-25 | End: 2023-07-25

## 2023-07-25 RX ORDER — CLOPIDOGREL BISULFATE 75 MG/1
75 TABLET ORAL DAILY
Status: DISCONTINUED | OUTPATIENT
Start: 2023-07-26 | End: 2023-07-25

## 2023-07-25 RX ORDER — ASPIRIN 81 MG/1
324 TABLET, CHEWABLE ORAL ONCE
Status: COMPLETED | OUTPATIENT
Start: 2023-07-25 | End: 2023-07-25

## 2023-07-25 RX ORDER — CLOPIDOGREL BISULFATE 75 MG/1
TABLET ORAL
Status: COMPLETED
Start: 2023-07-25 | End: 2023-07-25

## 2023-07-25 RX ORDER — LIDOCAINE HYDROCHLORIDE 20 MG/ML
INJECTION, SOLUTION EPIDURAL; INFILTRATION; INTRACAUDAL; PERINEURAL
Status: COMPLETED
Start: 2023-07-25 | End: 2023-07-25

## 2023-07-25 RX ADMIN — CHLORHEXIDINE GLUCONATE 30 ML: 4 SOLUTION TOPICAL at 07:00:00

## 2023-07-25 RX ADMIN — ASPIRIN 324 MG: 81 TABLET, CHEWABLE ORAL at 07:27:00

## 2023-07-25 RX ADMIN — SODIUM CHLORIDE: 9 INJECTION, SOLUTION INTRAVENOUS at 07:00:00

## 2023-07-25 NOTE — INTERVAL H&P NOTE
Pre-op Diagnosis: * No pre-op diagnosis entered *    The above referenced H&P was reviewed by Barrington Wall MD on 7/25/2023, the patient was examined and no significant changes have occurred in the patient's condition since the H&P was performed. I discussed with the patient and/or legal representative the potential benefits, risks and side effects of this procedure; the likelihood of the patient achieving goals; and potential problems that might occur during recuperation. I discussed reasonable alternatives to the procedure, including risks, benefits and side effects related to the alternatives and risks related to not receiving this procedure. We will proceed with procedure as planned.

## 2023-07-25 NOTE — DIETARY NOTE
NUTRITION EDUCATION NOTE     Received consult for cardiac nutrition education. Verbally reviewed basic cardiac diet restrictions. Provided with Eating Heart-Healthy and NCM handout to reinforce. Pt's wife present for visit. Receptive to instruction. Would benefit from outpt f/u. Expect fair compliance.         Jose A Tavarez RD, LDN  Clinical Dietitian  P: [phone]

## 2023-07-25 NOTE — DISCHARGE INSTRUCTIONS
TRANSRADIAL DISCHARGE INSTRUCTIONS AND HOME CARE FOLLOWING CORONARY ANGIOGRAPHY,  INSERTION OF STENT IN THE CORONARY    Activity    DO NOT drive after the procedure. You may resume driving late the following day according to the nurse or physician's instructions  Plan on resting and relaxing tonight and tomorrow  Resume your normal activity after 48 hours, or as instructed by your physician  Avoid drinking alcohol for the next 24 hours  Avoid wrist flexion, extension, and fine motor activities (i.e. texting, typing, using computer mouse, etc.) for 24 hours  Do not lift or pull anything heavier than 5 to 8  pounds with affected hand for 1 week    What is Normal?    A small lump at the procedure site associated with mild tenderness when touched  The procedure site may be bruised or discolored  There may be a small amount of drainage on the bandage    Special Instructions     Drink plenty of fluids during the next 24 hours to \"flush\" the contrast from your system  Keep the bandage clean and dry  After 24 hours, you must remove the bandage. Do not put ointment, powders, or creams to site.   You can shower after removing the bandage, and wash the procedure site gently with soap and water  DO NOT submerge the procedure site for 1 week (no bath tubs or pools)  If you choose to wear a bandage for a few days, make sure it remains clean and dry and that it is changed daily  For local swelling: apply ice  Bleeding can occur at the procedure site - both on the outside of the skin and/or beneath the surface of the skin        If bleeding occurs: Elevate hand above heart and apply local pressure  If the bleeding does not stop after 20 minutes, call 911 and continue to apply pressure  Swelling or a large lump at the procedure site can occur, which may be accompanied by moderate to severe pain      When to contact physician: Call Dr. Deny Boothe right away if you experience     Swelling, pain, or bleeding at the site that is not relieved by applying ice or pressure  Signs of infection: Redness, warmth, drainage at the site, chills, or temperature of 100.5 or greater  Changes in sensation, numbness, or tingling of affected hand    You Received a Stent:    You will remain on an antiplatelet drug and/or aspirin. Antiplatelet medications are usually taken for six months to one year and should not be stopped unless your cardiologist directs you to do so. These medications help to prevent blockage at the stent site. If another physician or dentist asks you to stop your antiplatelet medication, you need to consult your cardiologist first.  Together, your cardiologist and other physician can discuss the risks that may be involved if you are not taking the antiplatelet medication   If an MRI is necessary, it may be done 4-6 weeks after your procedure. Verify this with your cardiologist  Keep your stent card with you at all times! If you need an MRI in the future, your stent card will need to be shown to the technologist before performing the MRI. A duplicate card CANNOT be reproduced. Other    You may resume your present diet, unless otherwise specified by your physician. You may resume all of your medications as prescribed, unless otherwise directed by your physician. A list of your medications was provided to you at discharge. Gradually resume your previous aerobic exercise schedule as directed by your physician.       If you have any question or concern, please call the on-call nurse at [phone]

## 2023-07-25 NOTE — PROCEDURES
[facility]    Cardiac Cath Procedure Note  Ruth Milligan Patient Status:  Outpatient    1940 MRN [id number]   Location [facility] Attending Oneil Dickens MD   Hosp Day # 0 PCP Beatriz Miller DO       Cardiologist: Deanne Sinclair MD  Primary Proceduralist: Deanne Sinclair MD  Procedure Performed: LHC, COR, LV, and rota and PCI LAD  Date of Procedure: 2023   Indication: Positive stress test    Summary of procedure:    Successful rota and PCI LAD with ALEX x 1, kissing balloon inflation of LAD-Diag bifurcation. Assessment:  CAD s/p rota PCI LAD, angiographically under estimated degree of stenosis. Pre-intervntion JUSTIN 2 flow, improved after PCI. Kissing balloon LAD-D bifurcation of old diagonal stent. Recommendations:  DAPT aspirin and plavix  Continue atorva 80 mg daily      Left Ventriculography and hemodynamics:   LV EF not done  LV EDP 5 mmHg, 1 L IVF given  No gradient across aortic valve        Coronary Angiography  RCA:  Dominant and free of obstructive disease, supplies PDA and PL    Left main:  Free of obstructive disease    Left anterior descending:  Angiographically 70% focal LAD lesion at the bifurcation of the diagonal with patent diagonal stent. Remainder of the LAD free of obstructive disease, supplies multiple other diagonals which are non-obstructive    Circumflex:  Free of obstructive disease, supplies multiple OM branches which are patent. Inferior branch diffusely diseased but small. LAD intervention  Lesion Characteristics- not torturous, heavily calcified. Type non-C lesion. Pre-intervention stenosis 70%, Post intervention stenosis 0%.   Pre JUSTIN 2, Post JUSTIN 3.      Guide Catheter: FSLogix 3.5  Wire: ronaldo blue down LAD, black down diagonal  Pre-dil:  3.0 and 2.0 mm balloons would not pass despite guide extension  Rota:  marianna wired rota floppy, 1.5 mm lester 180,000 rpm, multiple passes  Re-wired with ronaldo black  Pre-dil 2.0 mm balloon, 3.0 mm balloon to BAT guide extension through lesion  Stent: 3.5 x 16 mm SYNERGY ALEX, jailed diagonal, re-wired with new black  Post-dil:  3.5 x 15 balloon LAD, 2.0 x 12 mm balloon in diagonal        Summary of Case: After written informed consent was obtained from the patient, patient was brought to the cardiac catheterization laboratory. Patient was prepped and draped in the usual sterile fashion. Lidocaine 1% was used to infiltrate the right radial artery for local anesthesia and a 6 Georgian introducer sheath was inserted into the right radial artery. Selective coronary angiography performed with JR4 catheter for RCA and Ikari 3.5 catheter for LCA. Angiography performed in standard projections. 6 Moroccan JR3 catheter placed in LV for hemodynamics. Specimen sent to: No specimen collected  Estimated blood loss: 10 cc  Closure:  TR band      IV was maintained by RN and moderate conscious sedation of versed and fentanyl was given. Patient was assessed and monitoring of oxygen, heart rate and blood pressure by nurse and myself during the exam 35 minutes.       Tracey Corbin MD  07/25/23

## 2023-07-25 NOTE — CARDIAC REHAB
Cardiac Rehab Phase I    Activity:  Distance   Assistance needed   Patient tolerated activity . Education:  Handouts provided and reviewed: [address] Diet: Healthy Cardiac diet reviewed. Disease Process: Disease process reviewed.     Reviewed the followiing:      RISK FACTORS: Reviewed      SMOKING CESSATION: Reviewed      HOME EXERCISE ACTIVITY: Reviewed      OUTPATIENT CARDIAC REHAB: Referred to Cardiac Rehabilitation

## 2023-07-25 NOTE — IVS NOTE
DISCHARGE NOTE     Pt is able to sit up and ambulate without difficulty. Pt voided and tolerated fluids and food. Right radial procedural site remains dry and intact with good circulation, motion and sensation. Armboard in place. No signs or symptoms of bleeding/hematoma noted. Pt denies any pain or discomfort at this time. IV access removed  Instruction provided, patient/family verbalizes understanding. Dr. Carlos A Sy spoke with patient/family post procedure. Pt discharge via wheelchair to [facility] [address]      Follow up Appointment: August 2nd at 3:10PM with Dr. Marge Hammans Prescription: Plavix - confirmed with patient's pharmacy, [facility] in [place] that prescription was received and will be ready for pickup today.

## 2023-07-28 DIAGNOSIS — I10 ESSENTIAL HYPERTENSION: ICD-10-CM

## 2023-07-28 DIAGNOSIS — R60.0 BILATERAL LEG EDEMA: ICD-10-CM

## 2023-07-31 RX ORDER — FUROSEMIDE 20 MG/1
20 TABLET ORAL
Qty: 90 TABLET | Refills: 3 | Status: SHIPPED | OUTPATIENT
Start: 2023-07-31

## 2023-07-31 NOTE — TELEPHONE ENCOUNTER
Please review. Protocol failed / Has no protocol.      Requested Prescriptions   Pending Prescriptions Disp Refills    FUROSEMIDE 20 MG Oral Tab [Pharmacy Med Name: FUROSEMIDE 20 MG TABLET] 90 tablet 2     Sig: TAKE 1 TABLET BY MOUTH EVERY DAY WITH DINNER       Hypertensive Medications Protocol Failed - 7/28/2023  6:48 PM        Failed - Last BP reading less than 140/90     BP Readings from Last 1 Encounters:  07/25/23 : 148/74              Failed - EGFRCR or GFRNAA > 50     GFR Evaluation  EGFRCR: 46 , resulted on 7/10/2023          Passed - In person appointment in the past 12 or next 3 months     Recent Outpatient Visits              1 month ago Bilateral leg edema    [address], [address], [address], [place] DO    Office Visit    1 month ago Seborrheic keratoses    [facility] Trae Campbell MD    Office Visit    1 month ago Bilateral leg edema    [address] [facility], [address], [place], DO    Office Visit    1 month ago Benign prostatic hyperplasia with nocturia    [facility], [address], [facility], [address], APRN    Office Visit    4 months ago Hyperglycemia    [address] [facility], [address], [place], DO    Office Visit          Future Appointments         Provider Department Appt Notes    In 1 week [address], [place], [address] [facility], [place] follow    In 8 months Trae Campbell MD [address], [place] full body    In 10 months Shell Appiah APRN [address], [address], [place] 1 year               Passed - CMP or BMP in past 6 months     Recent Results (from the past 4392 hour(s))   BASIC METABOLIC PANEL (8)    Collection Time: 07/10/23  5:13 PM   Result Value Ref Range    Glucose 86 70 - 99 mg/dL    Sodium 142 136 - 145 mmol/L    Potassium 5.1 3.5 - 5.1 mmol/L Chloride 106 98 - 112 mmol/L    CO2 27.0 21.0 - 32.0 mmol/L    Anion Gap 9 0 - 18 mmol/L    BUN 40 (H) 7 - 18 mg/dL    Creatinine 1.50 (H) 0.70 - 1.30 mg/dL    BUN/CREA Ratio 26.7 (H) 10.0 - 20.0    Calcium, Total 9.0 8.5 - 10.1 mg/dL    Calculated Osmolality 303 (H) 275 - 295 mOsm/kg    eGFR-Cr 46 (L) >=60 mL/min/1.73m2    Patient Fasting for BMP? No      *Note: Due to a large number of results and/or encounters for the requested time period, some results have not been displayed. A complete set of results can be found in Results Review.                Passed - In person appointment or virtual visit in the past 6 months     Recent Outpatient Visits              1 month ago Bilateral leg edema    [facility] [address], [place], DO    Office Visit    1 month ago [facility] keratoses    [facility]    Office Visit    1 month ago Bilateral leg edema    [facility] [address], [place], DO    Office Visit    1 month ago Benign prostatic hyperplasia with nocturia    [facility], [address], [facility], [address], APRN    Office Visit    4 months ago Hyperglycemia    [address], [address], [address], [place], DO    Office Visit          Future Appointments         Provider Department Appt Notes    In 1 week DO Delmi Winchester, [place] follow    In 8 months Jamal Salter MD [address], [facility] full body    In 10 months [place], [address], APRN [address], [address], [place] 1 year                  Future Appointments         Provider Department Appt Notes    In 1 week Joelle Beam DO [address], [address], [place] follow    In 8 months Jamal Salter MD [address], [address] [facility] [place] full body    In 10 months Lorenzo Pi, APRN [address], [address], [facility] 1 year           Recent Outpatient Visits              1 month ago Bilateral leg edema    [address], [address], [place], DO    Office Visit    1 month ago Seborrheic keratoses    [address] [facility], [place] Christy Camilo MD    Office Visit    1 month ago Bilateral leg edema    [address], [address], [place], DO    Office Visit    1 month ago Benign prostatic hyperplasia with nocturia    [facility], [address], [place], [address], APRN    Office Visit    4 months ago Hyperglycemia    [address], [address], [address], [place], DO    Office Visit

## 2023-08-12 ENCOUNTER — OFFICE VISIT (OUTPATIENT)
Dept: FAMILY MEDICINE CLINIC | Facility: CLINIC | Age: 83
End: 2023-08-12

## 2023-08-12 ENCOUNTER — LAB ENCOUNTER (OUTPATIENT)
Dept: LAB | Age: 83
End: 2023-08-12
Attending: FAMILY MEDICINE
Payer: COMMERCIAL

## 2023-08-12 VITALS
WEIGHT: 166.63 LBS | TEMPERATURE: 97 F | DIASTOLIC BLOOD PRESSURE: 70 MMHG | HEIGHT: 68 IN | SYSTOLIC BLOOD PRESSURE: 128 MMHG | BODY MASS INDEX: 25.25 KG/M2 | HEART RATE: 71 BPM

## 2023-08-12 DIAGNOSIS — I25.10 CORONARY ARTERY DISEASE INVOLVING NATIVE CORONARY ARTERY OF NATIVE HEART WITHOUT ANGINA PECTORIS: Primary | ICD-10-CM

## 2023-08-12 DIAGNOSIS — R60.0 BILATERAL LEG EDEMA: ICD-10-CM

## 2023-08-12 DIAGNOSIS — I10 ESSENTIAL HYPERTENSION, BENIGN: ICD-10-CM

## 2023-08-12 DIAGNOSIS — R73.9 HYPERGLYCEMIA: ICD-10-CM

## 2023-08-12 DIAGNOSIS — R79.89 ELEVATED SERUM CREATININE: ICD-10-CM

## 2023-08-12 LAB
ANION GAP SERPL CALC-SCNC: 1 MMOL/L (ref 0–18)
BUN BLD-MCNC: 29 MG/DL (ref 7–18)
CALCIUM BLD-MCNC: 9.7 MG/DL (ref 8.5–10.1)
CHLORIDE SERPL-SCNC: 106 MMOL/L (ref 98–112)
CO2 SERPL-SCNC: 28 MMOL/L (ref 21–32)
CREAT BLD-MCNC: 1.39 MG/DL
EGFRCR SERPLBLD CKD-EPI 2021: 51 ML/MIN/1.73M2 (ref 60–?)
EST. AVERAGE GLUCOSE BLD GHB EST-MCNC: 160 MG/DL (ref 68–126)
FASTING STATUS PATIENT QL REPORTED: YES
GLUCOSE BLD-MCNC: 112 MG/DL (ref 70–99)
HBA1C MFR BLD: 7.2 % (ref ?–5.7)
OSMOLALITY SERPL CALC.SUM OF ELEC: 287 MOSM/KG (ref 275–295)
POTASSIUM SERPL-SCNC: 5.8 MMOL/L (ref 3.5–5.1)
SODIUM SERPL-SCNC: 135 MMOL/L (ref 136–145)

## 2023-08-12 PROCEDURE — 36415 COLL VENOUS BLD VENIPUNCTURE: CPT

## 2023-08-12 PROCEDURE — 80048 BASIC METABOLIC PNL TOTAL CA: CPT

## 2023-08-12 PROCEDURE — 83036 HEMOGLOBIN GLYCOSYLATED A1C: CPT

## 2023-08-12 RX ORDER — SPIRONOLACTONE 25 MG/1
25 TABLET ORAL 2 TIMES DAILY
Qty: 180 TABLET | Refills: 3 | Status: SHIPPED | OUTPATIENT
Start: 2023-08-12

## 2023-08-13 DIAGNOSIS — I10 ESSENTIAL HYPERTENSION, BENIGN: Primary | ICD-10-CM

## 2023-08-19 ENCOUNTER — LAB ENCOUNTER (OUTPATIENT)
Dept: LAB | Age: 83
End: 2023-08-19
Attending: FAMILY MEDICINE
Payer: COMMERCIAL

## 2023-08-19 DIAGNOSIS — I10 ESSENTIAL HYPERTENSION, BENIGN: ICD-10-CM

## 2023-08-19 LAB
ANION GAP SERPL CALC-SCNC: 3 MMOL/L (ref 0–18)
BUN BLD-MCNC: 47 MG/DL (ref 7–18)
CALCIUM BLD-MCNC: 9.6 MG/DL (ref 8.5–10.1)
CHLORIDE SERPL-SCNC: 109 MMOL/L (ref 98–112)
CO2 SERPL-SCNC: 26 MMOL/L (ref 21–32)
CREAT BLD-MCNC: 1.46 MG/DL
EGFRCR SERPLBLD CKD-EPI 2021: 48 ML/MIN/1.73M2 (ref 60–?)
FASTING STATUS PATIENT QL REPORTED: NO
GLUCOSE BLD-MCNC: 103 MG/DL (ref 70–99)
OSMOLALITY SERPL CALC.SUM OF ELEC: 299 MOSM/KG (ref 275–295)
POTASSIUM SERPL-SCNC: 5.2 MMOL/L (ref 3.5–5.1)
SODIUM SERPL-SCNC: 138 MMOL/L (ref 136–145)

## 2023-08-19 PROCEDURE — 80048 BASIC METABOLIC PNL TOTAL CA: CPT

## 2023-08-19 PROCEDURE — 36415 COLL VENOUS BLD VENIPUNCTURE: CPT

## 2023-08-20 DIAGNOSIS — R79.89 ELEVATED SERUM CREATININE: ICD-10-CM

## 2023-08-20 DIAGNOSIS — I10 ESSENTIAL HYPERTENSION, BENIGN: Primary | ICD-10-CM

## 2023-08-20 DIAGNOSIS — E87.5 HYPERKALEMIA: ICD-10-CM

## 2023-10-16 ENCOUNTER — TELEPHONE (OUTPATIENT)
Dept: FAMILY MEDICINE CLINIC | Facility: CLINIC | Age: 83
End: 2023-10-16

## 2023-10-16 NOTE — TELEPHONE ENCOUNTER
Diabetic eye exam received from [address] [place] [address] ophthalmology, negative for retinopathy.  Entered into flowsheets

## 2023-10-21 ENCOUNTER — IMMUNIZATION (OUTPATIENT)
Dept: FAMILY MEDICINE CLINIC | Facility: CLINIC | Age: 83
End: 2023-10-21

## 2023-10-21 DIAGNOSIS — Z23 NEED FOR VACCINATION: Primary | ICD-10-CM

## 2023-10-21 PROCEDURE — 90471 IMMUNIZATION ADMIN: CPT | Performed by: FAMILY MEDICINE

## 2023-10-21 PROCEDURE — 90662 IIV NO PRSV INCREASED AG IM: CPT | Performed by: FAMILY MEDICINE

## 2023-11-07 ENCOUNTER — OFFICE VISIT (OUTPATIENT)
Dept: NEPHROLOGY | Facility: CLINIC | Age: 83
End: 2023-11-07
Payer: COMMERCIAL

## 2023-11-07 ENCOUNTER — LAB ENCOUNTER (OUTPATIENT)
Dept: LAB | Facility: HOSPITAL | Age: 83
End: 2023-11-07
Attending: INTERNAL MEDICINE
Payer: COMMERCIAL

## 2023-11-07 VITALS
HEIGHT: 68 IN | HEART RATE: 68 BPM | WEIGHT: 167 LBS | BODY MASS INDEX: 25.31 KG/M2 | DIASTOLIC BLOOD PRESSURE: 64 MMHG | SYSTOLIC BLOOD PRESSURE: 114 MMHG

## 2023-11-07 DIAGNOSIS — N17.9 AKI (ACUTE KIDNEY INJURY) (HCC): Primary | ICD-10-CM

## 2023-11-07 DIAGNOSIS — I10 PRIMARY HYPERTENSION: ICD-10-CM

## 2023-11-07 DIAGNOSIS — N17.9 AKI (ACUTE KIDNEY INJURY) (HCC): ICD-10-CM

## 2023-11-07 LAB
ALBUMIN SERPL-MCNC: 4.4 G/DL (ref 3.2–4.8)
ANION GAP SERPL CALC-SCNC: 6 MMOL/L (ref 0–18)
BILIRUB UR QL: NEGATIVE
BUN BLD-MCNC: 43 MG/DL (ref 9–23)
BUN/CREAT SERPL: 29.1 (ref 10–20)
CALCIUM BLD-MCNC: 9.4 MG/DL (ref 8.7–10.4)
CHLORIDE SERPL-SCNC: 108 MMOL/L (ref 98–112)
CLARITY UR: CLEAR
CO2 SERPL-SCNC: 25 MMOL/L (ref 21–32)
CREAT BLD-MCNC: 1.48 MG/DL
CREAT UR-SCNC: 109.4 MG/DL
EGFRCR SERPLBLD CKD-EPI 2021: 47 ML/MIN/1.73M2 (ref 60–?)
GLUCOSE BLD-MCNC: 92 MG/DL (ref 70–99)
GLUCOSE UR-MCNC: NORMAL MG/DL
HGB UR QL STRIP.AUTO: NEGATIVE
KETONES UR-MCNC: NEGATIVE MG/DL
LEUKOCYTE ESTERASE UR QL STRIP.AUTO: NEGATIVE
NITRITE UR QL STRIP.AUTO: NEGATIVE
OSMOLALITY SERPL CALC.SUM OF ELEC: 298 MOSM/KG (ref 275–295)
PH UR: 5 [PH] (ref 5–8)
PHOSPHATE SERPL-MCNC: 3.3 MG/DL (ref 2.4–5.1)
POTASSIUM SERPL-SCNC: 5.4 MMOL/L (ref 3.5–5.1)
PROT UR-MCNC: 12.4 MG/DL (ref ?–14)
PROT UR-MCNC: NEGATIVE MG/DL
PROT/CREAT UR-RTO: 0.11
SODIUM SERPL-SCNC: 139 MMOL/L (ref 136–145)
SP GR UR STRIP: 1.02 (ref 1–1.03)
UROBILINOGEN UR STRIP-ACNC: NORMAL

## 2023-11-07 PROCEDURE — 3078F DIAST BP <80 MM HG: CPT | Performed by: INTERNAL MEDICINE

## 2023-11-07 PROCEDURE — 3074F SYST BP LT 130 MM HG: CPT | Performed by: INTERNAL MEDICINE

## 2023-11-07 PROCEDURE — 36415 COLL VENOUS BLD VENIPUNCTURE: CPT

## 2023-11-07 PROCEDURE — 80069 RENAL FUNCTION PANEL: CPT

## 2023-11-07 PROCEDURE — 3008F BODY MASS INDEX DOCD: CPT | Performed by: INTERNAL MEDICINE

## 2023-11-07 PROCEDURE — 81003 URINALYSIS AUTO W/O SCOPE: CPT

## 2023-11-07 PROCEDURE — 99205 OFFICE O/P NEW HI 60 MIN: CPT | Performed by: INTERNAL MEDICINE

## 2023-11-07 PROCEDURE — 82570 ASSAY OF URINE CREATININE: CPT

## 2023-11-07 PROCEDURE — 84156 ASSAY OF PROTEIN URINE: CPT

## 2023-11-07 NOTE — PATIENT INSTRUCTIONS
Reduce spironolactone to 25 mg daily dose  Take amlodipine in evening   Follow up in 6-7 weeks  Lab tests prior to next visit   Low potassium diet

## 2023-11-07 NOTE — PROGRESS NOTES
Consult Requested By: Dr. Leonard Hassan    Reason for Consult: JOJO     HPI:     Patient is an 80-year-old male with past medical history of diabetes II x 20 yrs, hypertension, hyperlipidemia, coronary artery disease s/p PCI in July 2023, BPH s/p surgery who presented for work-up and evaluation of kidney disease    Lab test showed BUNs/creatinine 47/1.46 mg/dL with a EGFR 48 mm/min. Creatinine 0.98 mg/dL in June 2023. Serum potassium high at 5.2. Serum calcium within normal limits. A1c mostly in 6 range. UA unremarkable    Ex smoker and ex alcohol - stopped in 2007. No leg swelling no urinary complaints. No NSAIDs or herbal supplement or protein supplement     On furosemide, spironolactone, amlodipine, metoprolol 12.5 mg daily. Check daily weight at home and stable at 166-167 lbs.          HISTORY:  Past Medical History:   Diagnosis Date    Benign prostatic hypertrophy     Cataract     Colon polyps 2013    Coronary artery disease     Deep vein thrombosis (HCC)     Diabetes type 2, controlled ([address]     Borderline    Dysplastic nevus 2022    right upper back - mild dysplasia    Elevated PSA     High blood pressure     High cholesterol     Hypertension     Shortness of breath       Past Surgical History:   Procedure Laterality Date    CATARACT      CATH DRUG ELUTING STENT      Not sure what type    COLONOSCOPY N/A 2/5/2020    Procedure: COLONOSCOPY;  Surgeon: Jeffrey Partida MD;  Location: [facility] ENDOSCOPY    COLONOSCOPY      EGD  02/04/2020    HAND/FINGER SURGERY UNLISTED      Right index finger skin graft for injury    LIPOMA REMOVAL  7/15/2016    Left upper back     PT W/ CORONARY ARTERY STENT  2015    UPPER GI ENDOSCOPY,EXAM        Family History   Problem Relation Age of Onset    Diabetes Paternal Aunt     Glaucoma Neg       Social History:   Social History     Socioeconomic History    Marital status:     Number of children: 2   Occupational History    Occupation:      Comment: [employer]   Tobacco Use    Smoking status: Former     Types: Cigarettes     Quit date: 6/3/2007     Years since quittin.4    Smokeless tobacco: Never   Vaping Use    Vaping Use: Never used   Substance and Sexual Activity    Alcohol use: Not Currently     Alcohol/week: 0.0 standard drinks of alcohol     Comment: quit in     Drug use: No   Other Topics Concern    Caffeine Concern Yes    Reaction to local anesthetic No    Pt has a pacemaker Yes    Pt has a defibrillator No        Medications (Active prior to today's visit):  Current Outpatient Medications   Medication Sig Dispense Refill    spironolactone 25 MG Oral Tab Take 1 tablet (25 mg total) by mouth 2 (two) times daily. For blood pressure and swelling of the legs. 180 tablet 3    furosemide 20 MG Oral Tab Take 1 tablet (20 mg total) by mouth daily with dinner. 90 tablet 3    aspirin 81 MG Oral Tab EC Take 1 tablet (81 mg total) by mouth daily. 90 tablet 3    clopidogrel 75 MG Oral Tab Take 1 tablet (75 mg total) by mouth daily. 90 tablet 3    amLODIPine 5 MG Oral Tab Take 1 tablet (5 mg total) by mouth daily. For blood pressure. Went from 10 mg to 5 mg due to edema. 90 tablet 1    tamsulosin 0.4 MG Oral Cap Take 1 capsule (0.4 mg total) by mouth daily. 30 MIN AFTER SAME MEAL 90 capsule 3    finasteride 5 MG Oral Tab Take 1 tablet (5 mg total) by mouth daily. 90 tablet 3    metoprolol tartrate 25 MG Oral Tab Take 0.5 tablets (12.5 mg total) by mouth 2 (two) times daily. 90 tablet 2    atorvastatin 80 MG Oral Tab Take 1 tablet (80 mg total) by mouth nightly. 90 tablet 2    metroNIDAZOLE 0.75 % External Gel Use at bedtime to nose 60 g 1       Allergies:   Allergies   Allergen Reactions    Ibuprofen DIZZINESS         ROS:     Constitutional:  Negative for decreased activity, fever, irritability and lethargy  ENMT:  Negative for ear drainage, hearing loss and nasal drainage  Eyes:  Negative for eye discharge and vision loss  Cardiovascular:  Negative for chest pain, sobs  Respiratory:  Negative for cough, dyspnea and wheezing  Gastrointestinal:  Negative for abdominal pain, constipation  Genitourinary:  Negative for dysuria and hematuria  Endocrine:  Negative for abnormal sleep patterns, increased activity  Hema/Lymph:  Negative for easy bleeding and easy bruising  Integumentary:  Negative for pruritus and rash  Musculoskeletal:  Negative for bone/joint symptoms  Neurological:  Negative for gait disturbance  Psychiatric:  Negative for inappropriate interaction and psychiatric symptoms      Vitals:    11/07/23 1003   BP: 114/64   Pulse: 68     Wt Readings from Last 6 Encounters:   11/07/23 167 lb (75.8 kg)   08/12/23 166 lb 9.6 oz (75.6 kg)   07/18/23 170 lb (77.1 kg)   06/29/23 172 lb (78 kg)   06/08/23 183 lb (83 kg)   05/31/23 170 lb (77.1 kg)       PHYSICAL EXAM:   Constitutional: appears well hydrated alert and responsive no acute distress noted  Head/Face: normocephalic  Eyes/Vision: normal extraocular motion is intact  Nose/Mouth/Throat:mucous membranes are moist   Neck/Thyroid: neck is supple without adenopathy  Lymphatic: no abnormal cervical, supraclavicular adenopathy is noted  Skin/Hair: no unusual rashes present  Back/Spine: no abnormalities noted  Musculoskeletal:  no deformities  Extremities: no edema  Neurological:  Grossly normal       ASSESSMENT/PLAN:     1, CKD stage III:  BUNs/creatinine 47/1.46 mg/dL with a EGFR 48 mm/min. Creatinine 0.98 mg/dL in June 2023. Serum potassium high at 5.2. Rise in Creatinine post PCI and after starting diuretics   Reduce spironolactone to 25 mg daily dose and repeat lab test   Serum calcium within normal limits. A1c mostly in 6 range. UA unremarkable - repeat   Check UPCR   Repeat kidney function   Renal US   Low potassium diet     2. Coronary artery disease s/p PCI in July 2023: On furosemide, spironolactone- reduce spironolactone to 25 mg daily does     3.  HTN:  On furosemide, spironolactone, amlodipine, metoprolol 12.5 mg daily    Follow up in 6-7 weeks  Lab tests prior to next visit     Orders This Visit:  Orders Placed This Encounter   Procedures    Renal Function Panel    Protein/Creatinine Ratio, Urine Random    Urinalysis, Routine       Meds This Visit:  Requested Prescriptions      No prescriptions requested or ordered in this encounter       Imaging & Referrals:  [facility] KIDNEY/BLADDER (BOD=[id number])     11/7/2023  Elkin Santiago MD      No follow-ups on file.

## 2023-11-08 ENCOUNTER — TELEPHONE (OUTPATIENT)
Dept: NEPHROLOGY | Facility: CLINIC | Age: 83
End: 2023-11-08

## 2023-11-08 DIAGNOSIS — N17.9 AKI (ACUTE KIDNEY INJURY) (HCC): Primary | ICD-10-CM

## 2023-11-08 NOTE — TELEPHONE ENCOUNTER
Patient did the lab test earlier than requested    Potassium up - as recommended reduce spironolactone to daily dose .  Repeat lab test in 4 weeks - pls order BMP with 4 weeks so he cannot get it done early     Low potassium diet as counseled     Urine analysis and urine protein unremarkable

## 2023-12-01 ENCOUNTER — HOSPITAL ENCOUNTER (OUTPATIENT)
Dept: ULTRASOUND IMAGING | Facility: HOSPITAL | Age: 83
Discharge: HOME | End: 2023-12-01
Attending: INTERNAL MEDICINE
Payer: COMMERCIAL

## 2023-12-01 DIAGNOSIS — N17.9 AKI (ACUTE KIDNEY INJURY) (HCC): ICD-10-CM

## 2023-12-01 PROCEDURE — 76770 US EXAM ABDO BACK WALL COMP: CPT | Performed by: INTERNAL MEDICINE

## 2023-12-17 DIAGNOSIS — E78.2 MIXED HYPERLIPIDEMIA: ICD-10-CM

## 2023-12-17 RX ORDER — ATORVASTATIN CALCIUM 80 MG/1
80 TABLET, FILM COATED ORAL NIGHTLY
Qty: 90 TABLET | Refills: 3 | Status: SHIPPED | OUTPATIENT
Start: 2023-12-17

## 2023-12-17 NOTE — TELEPHONE ENCOUNTER
Refill passed per [facility] protocol.    Requested Prescriptions   Pending Prescriptions Disp Refills    ATORVASTATIN 80 MG Oral Tab [Pharmacy Med Name: ATORVASTATIN 80 MG TABLET] 90 tablet 2     Sig: TAKE 1 TABLET BY MOUTH EVERY DAY AT NIGHT       Cholesterol Medication Protocol Passed - 12/17/2023 12:25 AM        Passed - ALT in past 12 months        Passed - LDL in past 12 months        Passed - Last ALT < 80     Lab Results   Component Value Date    ALT 38 12/20/2022             Passed - Last LDL < 130     Lab Results   Component Value Date    LDL 31 12/20/2022             Passed - In person appointment or virtual visit in the past 12 mos or appointment in next 3 mos     Recent Outpatient Visits              1 month ago JOJO (acute kidney injury) (HCC)    [facility] Marko Monteiro MD    Office Visit    4 months ago Coronary artery disease involving native coronary artery of native heart without angina pectoris    [facility], DO    Office Visit    5 months ago Bilateral leg edema    [facility], DO    Office Visit    6 months ago Seborrheic keratoses    [facility] She Cruz MD    Office Visit    6 months ago Bilateral leg edema    [facility], DO    Office Visit          Future Appointments         Provider Department Appt Notes    In 1 month Marko Monteiro MD [facility]     In 3 months She Cruz MD [facility] full body    In 5 months Shell Appiah APRN [facility] 1 year                    Future Appointments         Provider Department Appt Notes    In 1 month Marko Monteiro MD  [facility]     In 3 months She Cruz MD [facility] full body    In 5 months Shell Appiah APRN [facility] 1 year            Recent Outpatient Visits              1 month ago JOJO (acute kidney injury) (HCC)    [facility] Marko Monteiro MD    Office Visit    4 months ago Coronary artery disease involving native coronary artery of native heart without angina pectoris    [facility]    [facility]    5 months ago Bilateral leg edema    [facility] Julian Bell, DO    Office Visit    6 months ago Seborrheic keratoses    [facility] She Cruz MD    Office Visit    6 months ago Bilateral leg edema    [facility] Julian Bell, DO    Office Visit

## 2024-01-10 DIAGNOSIS — I10 ESSENTIAL HYPERTENSION, BENIGN: ICD-10-CM

## 2024-01-10 DIAGNOSIS — R60.0 BILATERAL LEG EDEMA: ICD-10-CM

## 2024-01-11 NOTE — TELEPHONE ENCOUNTER
Protocol Failed/ No Protocol    Requested Prescriptions   Pending Prescriptions Disp Refills    AMLODIPINE 5 MG Oral Tab [Pharmacy Med Name: AMLODIPINE BESYLATE 5 MG TAB] 90 tablet 1     Sig: Take 1 tablet (5 mg total) by mouth daily. For blood pressure.  Went from 10 mg to 5 mg due to edema.       Hypertensive Medications Protocol Failed - 1/10/2024 12:09 AM        Failed - EGFRCR or GFRNAA > 50     GFR Evaluation  EGFRCR: 47 , resulted on 11/7/2023          Passed - In person appointment in the past 12 or next 3 months     Recent Outpatient Visits              2 months ago JOJO (acute kidney injury) (HCC)    [facility] Marko Monteiro MD    Office Visit    5 months ago Coronary artery disease involving native coronary artery of native heart without angina pectoris    [facility] Julian Hendricks,     Office Visit    6 months ago Bilateral leg edema    [facility]     [facility]    7 months ago Seborrheic keratoses    [facility] She Cruz MD    Office Visit    7 months ago Bilateral leg edema    [facility] Julian Hendricks, DO    Office Visit          Future Appointments         Provider Department Appt Notes    In 6 days Marko Monteiro MD [facility]     In 3 months She Cruz MD [facility] full body    In 5 months Bijal, LINDA De Paz [facility] 1 year               Passed - Last BP reading less than 140/90     BP Readings from Last 1 Encounters:   11/07/23 114/64               Passed - CMP or BMP in past 6 months     Recent Results (from the past 4392 hour(s))   Basic Metabolic Panel (8)    Collection Time: 08/19/23 10:30 AM    Result Value Ref Range    Glucose 103 (H) 70 - 99 mg/dL    Sodium 138 136 - 145 mmol/L    Potassium 5.2 (H) 3.5 - 5.1 mmol/L    Chloride 109 98 - 112 mmol/L    CO2 26.0 21.0 - 32.0 mmol/L    Anion Gap 3 0 - 18 mmol/L    BUN 47 (H) 7 - 18 mg/dL    Creatinine 1.46 (H) 0.70 - 1.30 mg/dL    Calcium, Total 9.6 8.5 - 10.1 mg/dL    Calculated Osmolality 299 (H) 275 - 295 mOsm/kg    eGFR-Cr 48 (L) >=60 mL/min/1.73m2    Patient Fasting for BMP? No      *Note: Due to a large number of results and/or encounters for the requested time period, some results have not been displayed. A complete set of results can be found in Results Review.               Passed - In person appointment or virtual visit in the past 6 months     Recent Outpatient Visits              2 months ago JOOJ (acute kidney injury) (HCC)    [facility] Marko Monteiro MD    Office Visit    5 months ago Coronary artery disease involving native coronary artery of native heart without angina pectoris    [facility]    Office Visit    6 months ago Bilateral leg edema    [facility] Julian Hendricks, DO    Office Visit    7 months ago Seborrheic keratoses    [facility] She Cruz MD    Office Visit    7 months ago [facility]ilateral leg edema    [facility]    [facility]          Future Appointments         Provider Department Appt Notes    In 6 days Marko Monteiro MD [facility]     In 3 months She Cruz MD [facility] full body    In 5 months Shell Appiah APRN [facility] 1 year                    Future Appointments         Provider Department Appt Notes    In 6 days Ever  Marko Sarabia MD [facility]     In 3 months She Cruz MD [facility] full body    In 5 months Shell Appiah APRN [facility] 1 year          Recent Outpatient Visits              2 months ago JOJO (acute kidney injury) (HCC)    [facility] Marko Sarabia MD    Office Visit    5 months ago Coronary artery disease involving native coronary artery of native heart without angina pectoris    [facility] Julian Bell,     Office Visit    6 months ago Bilateral leg edema    [facility] Julian Bell,     Office Visit    7 months ago Seborrheic keratoses    [facility] She Cruz MD    Office Visit    7 months ago Bilateral leg edema    [facility] Julian Bell,     Office Visit

## 2024-01-12 RX ORDER — AMLODIPINE BESYLATE 5 MG/1
5 TABLET ORAL DAILY
Qty: 90 TABLET | Refills: 3 | Status: SHIPPED | OUTPATIENT
Start: 2024-01-12

## 2024-01-16 ENCOUNTER — TELEPHONE (OUTPATIENT)
Dept: NEPHROLOGY | Facility: CLINIC | Age: 84
End: 2024-01-16

## 2024-01-16 ENCOUNTER — OFFICE VISIT (OUTPATIENT)
Dept: NEPHROLOGY | Facility: CLINIC | Age: 84
End: 2024-01-16

## 2024-01-16 ENCOUNTER — LAB ENCOUNTER (OUTPATIENT)
Dept: LAB | Facility: HOSPITAL | Age: 84
End: 2024-01-16
Attending: INTERNAL MEDICINE
Payer: COMMERCIAL

## 2024-01-16 VITALS
BODY MASS INDEX: 25.46 KG/M2 | HEIGHT: 68 IN | SYSTOLIC BLOOD PRESSURE: 112 MMHG | DIASTOLIC BLOOD PRESSURE: 60 MMHG | WEIGHT: 168 LBS | HEART RATE: 78 BPM

## 2024-01-16 DIAGNOSIS — N17.9 AKI (ACUTE KIDNEY INJURY) (HCC): ICD-10-CM

## 2024-01-16 DIAGNOSIS — N18.30 STAGE 3 CHRONIC KIDNEY DISEASE, UNSPECIFIED WHETHER STAGE 3A OR 3B CKD (HCC): ICD-10-CM

## 2024-01-16 DIAGNOSIS — N17.9 AKI (ACUTE KIDNEY INJURY) (HCC): Primary | ICD-10-CM

## 2024-01-16 DIAGNOSIS — E87.5 HYPERKALEMIA: Primary | ICD-10-CM

## 2024-01-16 LAB
ANION GAP SERPL CALC-SCNC: 8 MMOL/L (ref 0–18)
BUN BLD-MCNC: 40 MG/DL (ref 9–23)
BUN/CREAT SERPL: 23.4 (ref 10–20)
CALCIUM BLD-MCNC: 9.1 MG/DL (ref 8.7–10.4)
CHLORIDE SERPL-SCNC: 110 MMOL/L (ref 98–112)
CO2 SERPL-SCNC: 22 MMOL/L (ref 21–32)
CREAT BLD-MCNC: 1.71 MG/DL
EGFRCR SERPLBLD CKD-EPI 2021: 39 ML/MIN/1.73M2 (ref 60–?)
FASTING STATUS PATIENT QL REPORTED: NO
GLUCOSE BLD-MCNC: 98 MG/DL (ref 70–99)
OSMOLALITY SERPL CALC.SUM OF ELEC: 300 MOSM/KG (ref 275–295)
POTASSIUM SERPL-SCNC: 5.6 MMOL/L (ref 3.5–5.1)
SODIUM SERPL-SCNC: 140 MMOL/L (ref 136–145)

## 2024-01-16 PROCEDURE — 3008F BODY MASS INDEX DOCD: CPT | Performed by: INTERNAL MEDICINE

## 2024-01-16 PROCEDURE — 36415 COLL VENOUS BLD VENIPUNCTURE: CPT

## 2024-01-16 PROCEDURE — 3078F DIAST BP <80 MM HG: CPT | Performed by: INTERNAL MEDICINE

## 2024-01-16 PROCEDURE — 80048 BASIC METABOLIC PNL TOTAL CA: CPT

## 2024-01-16 PROCEDURE — 99215 OFFICE O/P EST HI 40 MIN: CPT | Performed by: INTERNAL MEDICINE

## 2024-01-16 PROCEDURE — 3074F SYST BP LT 130 MM HG: CPT | Performed by: INTERNAL MEDICINE

## 2024-01-16 NOTE — TELEPHONE ENCOUNTER
Lab test showed worsening renal function and high potassium     Hold spironolactone for now     Repeat lab test in one week - please order BMP     Please call patient with instruction

## 2024-01-16 NOTE — PROGRESS NOTES
Progress Note:     Patient is an 82-year-old male with past medical history of diabetes II x 20 yrs, hypertension, hyperlipidemia, coronary artery disease s/p PCI in July 2023, BPH s/p surgery who presented for follow up     Lab test showed BUNs/creatinine 47/1.46 mg/dL with a EGFR 48 mm/min.  Creatinine 0.98 mg/dL in June 2023.  Serum potassium high at 5.2.  Serum calcium within normal limits.  A1c mostly in 6 range.  UA unremarkable    Ex smoker and ex alcohol - stopped in 2007. No leg swelling no urinary complaints. No NSAIDs or herbal supplement or protein supplement     On furosemide, spironolactone, amlodipine, metoprolol 12.5 mg daily.  Check daily weight at home and stable at 166-167 lbs.         HISTORY:  Past Medical History:   Diagnosis Date    Benign prostatic hypertrophy     Cataract     Colon polyps 2013    Coronary artery disease     Deep vein thrombosis (HCC)     Diabetes type 2, controlled (HCC)     Borderline    Dysplastic nevus 2022    right upper back - mild dysplasia    Elevated PSA     High blood pressure     High cholesterol     Hypertension     Shortness of breath       Past Surgical History:   Procedure Laterality Date    CATARACT      CATH DRUG ELUTING STENT      Not sure what type    COLONOSCOPY N/A 2/5/2020    Procedure: COLONOSCOPY;  Surgeon: Kendrick Naranjo MD;  Location: [facility] ENDOSCOPY    COLONOSCOPY      EGD  02/04/2020    HAND/FINGER SURGERY UNLISTED      Right index finger skin graft for injury    LIPOMA REMOVAL  7/15/2016    Left upper back     PT W/ CORONARY ARTERY STENT  2015    UPPER GI ENDOSCOPY,EXAM        Family History   Problem Relation Age of Onset    Diabetes Paternal Aunt     Glaucoma Neg       Social History:   Social History     Socioeconomic History    Marital status:     Number of children: 2   Occupational History    [place]up[place]:      [place] [employer]   Tobacco Use    Smoking status: Former     Types: Cigarettes      Quit date: 6/3/2007     Years since quittin.6    Smokeless tobacco: Never   Vaping Use    Vaping Use: Never used   Substance and Sexual Activity    Alcohol use: Not Currently     Alcohol/week: 0.0 standard drinks of alcohol     Comment: quit in     Drug use: No   Other Topics Concern    Caffeine Concern Yes    Reaction to local anesthetic No    Pt has a pacemaker Yes    Pt has a defibrillator No        Medications (Active prior to today's visit):  Current Outpatient Medications   Medication Sig Dispense Refill    amLODIPine 5 MG Oral Tab Take 1 tablet (5 mg total) by mouth daily. For blood pressure.  Went from 10 mg to 5 mg due to edema. 90 tablet 3    atorvastatin 80 MG Oral Tab Take 1 tablet (80 mg total) by mouth nightly. 90 tablet 3    spironolactone 25 MG Oral Tab Take 1 tablet (25 mg total) by mouth 2 (two) times daily. For blood pressure and swelling of the legs. 180 tablet 3    furosemide 20 MG Oral Tab Take 1 tablet (20 mg total) by mouth daily with dinner. 90 tablet 3    aspirin 81 MG Oral Tab EC Take 1 tablet (81 mg total) by mouth daily. 90 tablet 3    clopidogrel 75 MG Oral Tab Take 1 tablet (75 mg total) by mouth daily. 90 tablet 3    tamsulosin 0.4 MG Oral Cap Take 1 capsule (0.4 mg total) by mouth daily. 30 MIN AFTER SAME MEAL 90 capsule 3    finasteride 5 MG Oral Tab Take 1 tablet (5 mg total) by mouth daily. 90 tablet 3    metoprolol tartrate 25 MG Oral Tab Take 0.5 tablets (12.5 mg total) by mouth 2 (two) times daily. 90 tablet 2    metroNIDAZOLE 0.75 % External Gel Use at bedtime to nose 60 g 1       Allergies:  Allergies   Allergen Reactions    Ibuprofen DIZZINESS         ROS:     Constitutional:  Negative for decreased activity, fever, irritability and lethargy  ENMT:  Negative for ear drainage, hearing loss and nasal drainage  Eyes:  Negative for eye discharge and vision loss  Cardiovascular:  Negative for chest pain, sobs  Respiratory:  Negative for cough, dyspnea and  wheezing  Gastrointestinal:  Negative for abdominal pain, constipation  Genitourinary:  Negative for dysuria and hematuria  Endocrine:  Negative for abnormal sleep patterns, increased activity  Hema/Lymph:  Negative for easy bleeding and easy bruising  Integumentary:  Negative for pruritus and rash  Musculoskeletal:  Negative for bone/joint symptoms  Neurological:  Negative for gait disturbance  Psychiatric:  Negative for inappropriate interaction and psychiatric symptoms      Vitals:    01/16/24 1124   BP: 112/60   Pulse: 78     Wt Readings from Last 6 Encounters:   01/16/24 168 lb (76.2 kg)   11/07/23 167 lb (75.8 kg)   08/12/23 166 lb 9.6 oz (75.6 kg)   07/18/23 170 lb (77.1 kg)   06/29/23 172 lb (78 kg)   06/08/23 183 lb (83 kg)       PHYSICAL EXAM:   Constitutional: appears well hydrated alert and responsive no acute distress noted  Head/Face: normocephalic  Eyes/Vision: normal extraocular motion is intact  Nose/Mouth/Throat:mucous membranes are moist   Neck/Thyroid: neck is supple without adenopathy  Lymphatic: no abnormal cervical, supraclavicular adenopathy is noted  Skin/Hair: no unusual rashes present  Back/Spine: no abnormalities noted  Musculoskeletal:  no deformities  Extremities: no edema  Neurological:  Grossly normal       ASSESSMENT/PLAN:     1, CKD stage III:  BUNs/creatinine 47/1.46 mg/dL with a EGFR 48 mm/min - worsen to 1.7 mg/dl. Repeat after reducing dose of spironolactone   Creatinine 0.98 mg/dL in June 2023.  Serum potassium high at 5.2.    Rise in Creatinine post PCI and after starting diuretics   Reduce spironolactone to 25 mg daily dose and repeat lab test   Serum calcium within normal limits.    A1c mostly in 6 range.    UA unremarkable   UPCR unremarkable   Repeat kidney function   Renal US - showed echogenic kidneys with normal size and renal cysts.  Low potassium diet - recheck pending    2. Coronary artery disease s/p PCI in July 2023:  On furosemide, spironolactone to 25 mg  daily      3. HTN:  On furosemide 20 mg daily dose, spironolactone, amlodipine 5 mg daily, metoprolol 12.5 mg BID  Reduce spironolactone to daily dose for high potassium   Low potassium diet re counseled     Follow up in 4 months     Orders This Visit:  No orders of the defined types were placed in this encounter.      Meds This Visit:  Requested Prescriptions      No prescriptions requested or ordered in this encounter       Imaging & Referrals:  None     1/16/2024  Marko Sarabia MD      No follow-ups on file.

## 2024-01-16 NOTE — PATIENT INSTRUCTIONS
Follow up in 4 months  Lab test as ordered today   Reduce spironolactone to 25 mg daily dose   Monitor blood pressure at home

## 2024-01-19 ENCOUNTER — TELEPHONE (OUTPATIENT)
Dept: NEPHROLOGY | Facility: CLINIC | Age: 84
End: 2024-01-19

## 2024-01-19 NOTE — TELEPHONE ENCOUNTER
Pt states that during OV with MD on 1/16/24, he was instructed to stop a medication.  He states he does not remember which medication he is suppose to stop.  Please call     Needed

## 2024-01-19 NOTE — TELEPHONE ENCOUNTER
Informed patient per encounter on 1/16/24-     Marko Monteiro MD         1/16/24  2:13 PM  Note     Lab test showed worsening renal function and high potassium      Hold spironolactone for now      Repeat lab test in one week - please order BMP      Please call patient with instruction       Pt verbalized understanding.

## 2024-01-26 ENCOUNTER — OFFICE VISIT (OUTPATIENT)
Dept: FAMILY MEDICINE CLINIC | Facility: CLINIC | Age: 84
End: 2024-01-26

## 2024-01-26 ENCOUNTER — LAB ENCOUNTER (OUTPATIENT)
Dept: LAB | Age: 84
End: 2024-01-26
Attending: FAMILY MEDICINE
Payer: COMMERCIAL

## 2024-01-26 VITALS
DIASTOLIC BLOOD PRESSURE: 67 MMHG | TEMPERATURE: 97 F | HEART RATE: 60 BPM | HEIGHT: 68 IN | BODY MASS INDEX: 24.55 KG/M2 | WEIGHT: 162 LBS | SYSTOLIC BLOOD PRESSURE: 132 MMHG

## 2024-01-26 DIAGNOSIS — I10 ESSENTIAL HYPERTENSION, BENIGN: ICD-10-CM

## 2024-01-26 DIAGNOSIS — N40.1 BENIGN NON-NODULAR PROSTATIC HYPERPLASIA WITH LOWER URINARY TRACT SYMPTOMS: ICD-10-CM

## 2024-01-26 DIAGNOSIS — I25.10 CORONARY ARTERY DISEASE INVOLVING NATIVE CORONARY ARTERY OF NATIVE HEART WITHOUT ANGINA PECTORIS: ICD-10-CM

## 2024-01-26 DIAGNOSIS — E78.2 MIXED HYPERLIPIDEMIA: ICD-10-CM

## 2024-01-26 DIAGNOSIS — N40.1 BENIGN PROSTATIC HYPERPLASIA WITH URINARY FREQUENCY: ICD-10-CM

## 2024-01-26 DIAGNOSIS — N18.30 STAGE 3 CHRONIC KIDNEY DISEASE, UNSPECIFIED WHETHER STAGE 3A OR 3B CKD (HCC): ICD-10-CM

## 2024-01-26 DIAGNOSIS — Z00.00 ADULT GENERAL MEDICAL EXAM: ICD-10-CM

## 2024-01-26 DIAGNOSIS — R35.0 BENIGN PROSTATIC HYPERPLASIA WITH URINARY FREQUENCY: ICD-10-CM

## 2024-01-26 DIAGNOSIS — R73.9 HYPERGLYCEMIA: ICD-10-CM

## 2024-01-26 DIAGNOSIS — R79.89 ELEVATED SERUM CREATININE: ICD-10-CM

## 2024-01-26 DIAGNOSIS — L57.8 SUN-DAMAGED SKIN: ICD-10-CM

## 2024-01-26 DIAGNOSIS — H25.9 AGE-RELATED CATARACT OF BOTH EYES, UNSPECIFIED AGE-RELATED CATARACT TYPE: ICD-10-CM

## 2024-01-26 DIAGNOSIS — N17.9 AKI (ACUTE KIDNEY INJURY) (HCC): ICD-10-CM

## 2024-01-26 DIAGNOSIS — E11.9 CONTROLLED TYPE 2 DIABETES MELLITUS WITHOUT COMPLICATION, WITHOUT LONG-TERM CURRENT USE OF INSULIN (HCC): ICD-10-CM

## 2024-01-26 DIAGNOSIS — Z00.00 ADULT GENERAL MEDICAL EXAM: Primary | ICD-10-CM

## 2024-01-26 LAB
ALBUMIN SERPL-MCNC: 4.3 G/DL (ref 3.2–4.8)
ALP LIVER SERPL-CCNC: 89 U/L
ALT SERPL-CCNC: 19 U/L
ANION GAP SERPL CALC-SCNC: 3 MMOL/L (ref 0–18)
AST SERPL-CCNC: 17 U/L (ref ?–34)
BASOPHILS # BLD AUTO: 0.05 X10(3) UL (ref 0–0.2)
BASOPHILS NFR BLD AUTO: 0.5 %
BILIRUB DIRECT SERPL-MCNC: 0.3 MG/DL (ref ?–0.3)
BILIRUB SERPL-MCNC: 0.6 MG/DL (ref 0.2–1.1)
BUN BLD-MCNC: 27 MG/DL (ref 9–23)
BUN/CREAT SERPL: 21.3 (ref 10–20)
CALCIUM BLD-MCNC: 9.1 MG/DL (ref 8.7–10.4)
CHLORIDE SERPL-SCNC: 112 MMOL/L (ref 98–112)
CHOLEST SERPL-MCNC: 65 MG/DL (ref ?–200)
CO2 SERPL-SCNC: 25 MMOL/L (ref 21–32)
COMPLEXED PSA SERPL-MCNC: 1.33 NG/ML (ref ?–4)
CREAT BLD-MCNC: 1.27 MG/DL
CREAT UR-SCNC: 182.9 MG/DL
DEPRECATED RDW RBC AUTO: 46.3 FL (ref 35.1–46.3)
EGFRCR SERPLBLD CKD-EPI 2021: 56 ML/MIN/1.73M2 (ref 60–?)
EOSINOPHIL # BLD AUTO: 0.31 X10(3) UL (ref 0–0.7)
EOSINOPHIL NFR BLD AUTO: 3.4 %
ERYTHROCYTE [DISTWIDTH] IN BLOOD BY AUTOMATED COUNT: 14.1 % (ref 11–15)
EST. AVERAGE GLUCOSE BLD GHB EST-MCNC: 137 MG/DL (ref 68–126)
FASTING PATIENT LIPID ANSWER: YES
FASTING STATUS PATIENT QL REPORTED: YES
GLUCOSE BLD-MCNC: 96 MG/DL (ref 70–99)
HBA1C MFR BLD: 6.4 % (ref ?–5.7)
HCT VFR BLD AUTO: 38.6 %
HDLC SERPL-MCNC: 32 MG/DL (ref 40–59)
HGB BLD-MCNC: 12.6 G/DL
IMM GRANULOCYTES # BLD AUTO: 0.03 X10(3) UL (ref 0–1)
IMM GRANULOCYTES NFR BLD: 0.3 %
LDLC SERPL CALC-MCNC: 19 MG/DL (ref ?–100)
LYMPHOCYTES # BLD AUTO: 1.46 X10(3) UL (ref 1–4)
LYMPHOCYTES NFR BLD AUTO: 15.8 %
MCH RBC QN AUTO: 29.4 PG (ref 26–34)
MCHC RBC AUTO-ENTMCNC: 32.6 G/DL (ref 31–37)
MCV RBC AUTO: 90 FL
MICROALBUMIN UR-MCNC: 4.8 MG/DL
MICROALBUMIN/CREAT 24H UR-RTO: 26.2 UG/MG (ref ?–30)
MONOCYTES # BLD AUTO: 0.79 X10(3) UL (ref 0.1–1)
MONOCYTES NFR BLD AUTO: 8.5 %
NEUTROPHILS # BLD AUTO: 6.61 X10 (3) UL (ref 1.5–7.7)
NEUTROPHILS # BLD AUTO: 6.61 X10(3) UL (ref 1.5–7.7)
NEUTROPHILS NFR BLD AUTO: 71.5 %
NONHDLC SERPL-MCNC: 33 MG/DL (ref ?–130)
OSMOLALITY SERPL CALC.SUM OF ELEC: 295 MOSM/KG (ref 275–295)
PLATELET # BLD AUTO: 252 10(3)UL (ref 150–450)
POTASSIUM SERPL-SCNC: 5.2 MMOL/L (ref 3.5–5.1)
PROT SERPL-MCNC: 7.2 G/DL (ref 5.7–8.2)
RBC # BLD AUTO: 4.29 X10(6)UL
SODIUM SERPL-SCNC: 140 MMOL/L (ref 136–145)
TRIGL SERPL-MCNC: 52 MG/DL (ref 30–149)
TSI SER-ACNC: 0.76 MIU/ML (ref 0.55–4.78)
VLDLC SERPL CALC-MCNC: 7 MG/DL (ref 0–30)
WBC # BLD AUTO: 9.3 X10(3) UL (ref 4–11)

## 2024-01-26 PROCEDURE — 80076 HEPATIC FUNCTION PANEL: CPT

## 2024-01-26 PROCEDURE — 84443 ASSAY THYROID STIM HORMONE: CPT

## 2024-01-26 PROCEDURE — 82570 ASSAY OF URINE CREATININE: CPT

## 2024-01-26 PROCEDURE — 80048 BASIC METABOLIC PNL TOTAL CA: CPT

## 2024-01-26 PROCEDURE — 36415 COLL VENOUS BLD VENIPUNCTURE: CPT

## 2024-01-26 PROCEDURE — 85025 COMPLETE CBC W/AUTO DIFF WBC: CPT

## 2024-01-26 PROCEDURE — 82043 UR ALBUMIN QUANTITATIVE: CPT

## 2024-01-26 PROCEDURE — 80061 LIPID PANEL: CPT

## 2024-01-26 PROCEDURE — 83036 HEMOGLOBIN GLYCOSYLATED A1C: CPT

## 2024-01-26 RX ORDER — SPIRONOLACTONE 25 MG/1
TABLET ORAL
COMMUNITY
Start: 2024-01-20

## 2024-01-26 NOTE — PROGRESS NOTES
Patient ID: Huseyin Lindsay is a 83 year old male.    HPI  Chief Complaint   Patient presents with    Routine Physical     Last physical on 12/20/2022.      Pt is still working and he takes the train to [place] for work and walks to the daily center..      Last A1c value was 7.2% done 8/12/2023. Patient denies any chest pain, shortness breath, diaphoresis, dizziness, hypoglycemia, numbness or tingling in the legs. Pt sees ophthalmology regularly. I provided a referral to podiatry.     Pt is taking clopidogrel 75 mg per cardiology. I reviewed his labs from Dr. Ever Sarabia, nephrologist; his kidney function is decreased. Hx CKD. Pt is taking furosemide 20 mg to help the swelling in his legs. He is taking spironolactone 25 mg once daily instead of twice daily per the nephrologist, amlodipine 5 mg and metoprolol tartrate 25 mg for BP. Pt is taking tamsulosin 0.4 mg for his prostate per urology. He will see Dr. Cruz, dermatologist in April 2024.      Health Maintenance   Topic Date Due    Zoster Vaccines (2 of 2) 12/17/2022    Diabetes Care: Microalb/Creat Ratio  12/20/2023    Annual Physical  12/20/2023    PSA  12/20/2023    Annual Depression Screening  01/01/2024    Fall Risk Screening (Annual)  01/01/2024    Diabetes Care Foot Exam (Annual)  01/01/2024    Diabetes Care A1C  02/12/2024    Diabetes Care Dilated Eye Exam  10/13/2024    Diabetes Care: GFR  01/16/2025    Influenza Vaccine  Completed    Pneumococcal Vaccine: 65+ Years  Completed    COVID-19 Vaccine  Completed       =======================================================    Lab Results   Component Value Date    WBC 11.9 (H) 07/10/2023    RBC 5.10 07/10/2023    HGB 13.4 07/10/2023    HCT 42.3 07/10/2023    .0 07/10/2023    MPV 9.3 05/26/2018    MCV 82.9 07/10/2023    MCH 26.3 07/10/2023    MCHC 31.7 07/10/2023    RDW 15.1 (H) 07/10/2023    NEPRELIM 8.18 (H) 07/10/2023    NEUT 5.5 09/12/2015    LYMPH 1.7 09/12/2015    MON 0.8 09/12/2015    EOS 0.5  09/12/2015    BASO 0.1 09/12/2015    NEPERCENT 68.9 07/10/2023    LYPERCENT 16.6 07/10/2023    MOPERCENT 9.3 07/10/2023    EOPERCENT 4.1 07/10/2023    BAPERCENT 0.6 07/10/2023    NE 8.18 (H) 07/10/2023    LYMABS 1.97 07/10/2023    MOABSO 1.11 (H) 07/10/2023    EOABSO 0.49 07/10/2023    BAABSO 0.07 07/10/2023       Lab Results   Component Value Date    GLU 98 01/16/2024    BUN 40 (H) 01/16/2024    BUNCREA 23.4 (H) 01/16/2024    CREATSERUM 1.71 (H) 01/16/2024    ANIONGAP 8 01/16/2024    GFRNAA 83 01/14/2022    GFRAA 96 01/14/2022    CA 9.1 01/16/2024    OSMOCALC 300 (H) 01/16/2024    ALKPHO 122 (H) 12/20/2022    AST 21 12/20/2022    ALT 38 12/20/2022    ALKPHOS 69 06/21/2016    BILT 0.8 12/20/2022    TP 8.0 12/20/2022    ALB 4.4 11/07/2023    GLOBULIN 4.3 12/20/2022    AGRATIO 0.9 (L) 06/21/2016     01/16/2024    K 5.6 (H) 01/16/2024     01/16/2024    CO2 22.0 01/16/2024       Lab Results   Component Value Date    GLU 98 01/16/2024    BUN 40 (H) 01/16/2024    CREATSERUM 1.71 (H) 01/16/2024    BUNCREA 23.4 (H) 01/16/2024    ANIONGAP 8 01/16/2024    GFRAA 96 01/14/2022    GFRNAA 83 01/14/2022    CA 9.1 01/16/2024     01/16/2024    K 5.6 (H) 01/16/2024     01/16/2024    CO2 22.0 01/16/2024    OSMOCALC 300 (H) 01/16/2024       Lab Results   Component Value Date    COLORUR Light-Yellow 11/07/2023    CLARITY Clear 11/07/2023    SPECGRAVITY 1.016 11/07/2023    GLUUR Normal 11/07/2023    BILUR Negative 11/07/2023    KETUR Negative 11/07/2023    BLOODURINE Negative 11/07/2023    PHURINE 5.0 11/07/2023    PROUR Negative 11/07/2023    UROBILINOGEN Normal 11/07/2023    NITRITE Negative 11/07/2023    LEUUR Negative 11/07/2023    ASCACIDURINE Negative 08/31/2016    NMIC Microscopic not indicated 11/07/2023    WBCUR 2 09/06/2018    RBCUR 1 09/06/2018    HYALCASTURN 1 08/31/2016    BACUR Negative 09/06/2018    MICCOM Completed 08/31/2016     Lab Results   Component Value Date     (H) 08/12/2023    A1C  7.2 (H) 08/12/2023    A1C 6.6 (H) 03/25/2023    A1C 6.8 (H) 12/20/2022       Lab Results   Component Value Date    MALBP 155.00 12/20/2022    CREUR 109.40 11/07/2023    CREAURINE 140.8 06/21/2016    MIALBURINE 34.2 (H) 06/21/2016    MCRRATIOUR 242.9 (H) 06/21/2016       Lab Results   Component Value Date    CHOLEST 92 12/20/2022    TRIG 68 12/20/2022    HDL 46 12/20/2022    LDL 31 12/20/2022    VLDL 9 12/20/2022    NONHDLC 46 12/20/2022    CALCNONHDL 38 06/21/2016    CALCNONHDL 48 09/12/2015    CALCNONHDL 79 10/18/2014     TSH (mIU/mL)   Date Value   12/20/2022 1.350     TSH (S) (uIU/mL)   Date Value   09/12/2015 0.65       Lab Results   Component Value Date    IRON 15 (L) 02/03/2020       Lab Results   Component Value Date    SAT 11 (L) 08/12/2017       Lab Results   Component Value Date    IRON 15 (L) 02/03/2020    TRANSFERRIN 262 08/12/2017    TIBCP 346 08/12/2017    SAT 11 (L) 08/12/2017       Lab Results   Component Value Date    KHANH 17.1 (L) 02/03/2020     Lab Results   Component Value Date    PSA 2.0 08/12/2017    TOTPSASCREEN 3.0 10/18/2014       =======================================================    Wt Readings from Last 6 Encounters:   01/26/24 162 lb   01/16/24 168 lb   11/07/23 167 lb   08/12/23 166 lb 9.6 oz   07/18/23 170 lb   06/29/23 172 lb               BMI Readings from Last 6 Encounters:   01/26/24 24.63 kg/m²   01/16/24 25.54 kg/m²   11/07/23 25.39 kg/m²   08/12/23 25.33 kg/m²   07/18/23 25.85 kg/m²   06/29/23 25.77 kg/m²       BP Readings from Last 6 Encounters:   01/26/24 132/67   01/16/24 112/60   11/07/23 114/64   08/12/23 128/70   07/25/23 148/74   06/29/23 132/60         Review of Systems   Respiratory:  Negative for shortness of breath.    Cardiovascular:  Negative for chest pain.         Past Medical History:   Diagnosis Date    Benign prostatic hypertrophy     Cataract     Colon polyps 2013    Coronary artery disease     Deep vein thrombosis (HCC)     Diabetes type 2, controlled  (HCC)     Borderline    Dysplastic nevus     right upper back - mild dysplasia    Elevated PSA     High blood pressure     High cholesterol     Hypertension     Shortness of breath        Past Surgical History:   Procedure Laterality Date    CATARACT      CATH DRUG ELUTING STENT      Not sure what type    COLONOSCOPY N/A 2020    Procedure: COLONOSCOPY;  Surgeon: Kendrick Naranjo MD;  [facility]: [facility] ENDOSCOPY    COLONOSCOPY      EGD  2020    HAND/FINGER SURGERY UNLISTED      Right index finger skin graft for injury    LIPOMA REMOVAL  7/15/2016    Left upper back     PT W/ CORONARY ARTERY STENT      UPPER GI ENDOSCOPY,EXAM         Social History     Socioeconomic History    Marital status:      Spouse name: Not on file    Number of children: 2    Years of education: Not on file    Highest education level: Not on file   Occupational History    Occupation:      Comment: [employer]   Tobacco Use    Smoking status: Former     Types: Cigarettes     Quit date: 6/3/2007     Years since quittin.6    Smokeless tobacco: Never   Vaping Use    Vaping Use: Never used   Substance and Sexual Activity    Alcohol use: Not Currently     Alcohol/week: 0.0 standard drinks of alcohol     Comment: quit in     Drug use: No    Sexual activity: Not on file   Other Topics Concern     Service Not Asked    Blood Transfusions Not Asked    Caffeine Concern Yes    Occupational Exposure Not Asked    Hobby Hazards Not Asked    Sleep Concern Not Asked    Stress Concern Not Asked    Weight Concern Not Asked    Special Diet Not Asked    Back Care Not Asked    Exercise Not Asked    Bike Helmet Not Asked    Seat Belt Not Asked    Self-Exams Not Asked    Grew up on a farm Not Asked    History of tanning Not Asked    Outdoor occupation Not Asked    Reaction to local anesthetic No    Pt has a pacemaker Yes    Pt has a defibrillator No   Social History Narrative    Not on file      Social Determinants of Health     Financial Resource Strain: Not on file   Food Insecurity: Not on file   Transportation Needs: Not on file   Physical Activity: Not on file   Stress: Not on file   Social Connections: Not on file   Housing Stability: Not on file          Current Outpatient Medications   Medication Sig Dispense Refill    spironolactone 25 MG Oral Tab       amLODIPine 5 MG Oral Tab Take 1 tablet (5 mg total) by mouth daily. For blood pressure.  Went from 10 mg to 5 mg due to edema. 90 tablet 3    atorvastatin 80 MG Oral Tab Take 1 tablet (80 mg total) by mouth nightly. 90 tablet 3    furosemide 20 MG Oral Tab Take 1 tablet (20 mg total) by mouth daily with dinner. 90 tablet 3    aspirin 81 MG Oral Tab EC Take 1 tablet (81 mg total) by mouth daily. 90 tablet 3    clopidogrel 75 MG Oral Tab Take 1 tablet (75 mg total) by mouth daily. 90 tablet 3    tamsulosin 0.4 MG Oral Cap Take 1 capsule (0.4 mg total) by mouth daily. 30 MIN AFTER SAME MEAL 90 capsule 3    finasteride 5 MG Oral Tab Take 1 tablet (5 mg total) by mouth daily. 90 tablet 3    metoprolol tartrate 25 MG Oral Tab Take 0.5 tablets (12.5 mg total) by mouth 2 (two) times daily. 90 tablet 2    metroNIDAZOLE 0.75 % External Gel Use at bedtime to nose 60 g 1     Allergies:  Allergies   Allergen Reactions    Ibuprofen DIZZINESS      PHYSICAL EXAM:   Physical Exam      Physical Exam   Constitutional: He appears well-developed and well-nourished. No distress.   Head: Normocephalic.   Right Ear: Tympanic membrane and ear canal normal.   Left Ear: Tympanic membrane and ear canal normal.   Nose: No mucosal edema or rhinorrhea.  Mouth/Throat: Oropharynx is clear and moist and mucous membranes are normal.   Eyes: Conjunctivae and EOM are normal. Pupils are equal, round, and reactive to light.   Neck: Normal range of motion. Neck supple. No thyromegaly present.   Cardiovascular: Normal rate, regular rhythm and no murmur heard.   Pulmonary/Chest: Effort  normal and breath sounds normal. No respiratory distress.  Not winded when he speaks.  Abdominal: Soft. Bowel sounds are normal. There is no hepatosplenomegaly. There is no tenderness.   Lymphadenopathy: He has no cervical adenopathy.   Neurological: He is alert and oriented to person, place, and time. He has normal reflexes. No cranial nerve deficit.   Skin: Skin is warm and dry. Sun spots on the forearms and the hands.   Psychiatric: He has a normal mood and affect.  Lower legs: Trace ankle edema LJ but not bothersome as this is very minimal.  No ulcerations of the legs.  He still walks quite a bit..       Vitals reviewed.    Blood pressure 132/67, pulse 60, temperature 97.4 °F (36.3 °C), temperature source Temporal, height 5' 8\" (1.727 m), weight 162 lb.         ASSESSMENT/PLAN:     Diagnoses and all orders for this visit:    Adult general medical exam  -     CBC With Differential With Platelet; Future  -     Lipid Panel; Future  -     PSA Total, Screen; Future  -     Assay, Thyroid Stim Hormone; Future  -     Hepatic Function Panel (7); Future    Coronary artery disease involving native coronary artery of native heart without angina pectoris  -     metoprolol tartrate 25 MG Oral Tab; Take 0.5 tablets (12.5 mg total) by mouth 2 (two) times daily.    Essential hypertension, benign  Controlled, continue present management  Mixed hyperlipidemia  Continue cholesterol medicatio    Benign non-nodular prostatic hyperplasia with lower urinary tract symptoms  He will check labs.  Benign prostatic hyperplasia with urinary frequency    Age-related cataract of both eyes, unspecified age-related cataract type    Controlled type 2 diabetes mellitus without complication, without long-term current use of insulin (HCC)  -     Hemoglobin A1C; Future  -     Microalb/Creat Ratio, Random Urine; Future  -     PODIATRY - INTERNAL    Elevated serum creatinine  He is following with nephrology.  Sun-damaged skin  -     DERM -  INTERNAL        Referrals (if applicable)  Orders Placed This Encounter   Procedures    PODIATRY - INTERNAL     Referral Priority:   Routine     Referral Type:   OFFICE VISIT     Referred to Provider:   Nereida Taveras DPM     Requested Specialty:   PODIATRIST     Number of Visits Requested:   3    DERM - INTERNAL     SKIN DOCTOR    Call [phone].     Referral Priority:   Routine     Referral Type:   OFFICE VISIT     Referred to Provider:   She Cruz MD     Requested Specialty:   DERMATOLOGY     Number of Visits Requested:   3       Follow up if symptoms persist.  Take medicine (if given) as prescribed.  Approach to treatment discussed and patient/family member understands and agrees to plan.     No follow-ups on file.    There are no Patient Instructions on file for this visit.    Jigar Arango    1/26/2024    By signing my name below, IJigar,  attest that this documentation has been prepared under the direction and in the presence of Julian Hendricks DO.   Electronically Signed: Jigar Arango, 1/26/2024, 8:39 AM.      I, Julian Hendricks DO,  personally performed the services described in this documentation. All medical record entries made by the scribe were at my direction and in my presence.  I have reviewed the chart and discharge instructions (if applicable) and agree that the record reflects my personal performance and is accurate and complete.  Julian Hendricks DO, 1/26/2024, 5:54 PM

## 2024-01-29 ENCOUNTER — TELEPHONE (OUTPATIENT)
Dept: FAMILY MEDICINE CLINIC | Facility: CLINIC | Age: 84
End: 2024-01-29

## 2024-01-29 NOTE — TELEPHONE ENCOUNTER
Pt stated someone called about his labs-no documentation-relayed Dr message, verbalized understanding   Urine is normal.  PSA for your prostate is normal.  Your liver tests are normal.  Your thyroid is normal.  Your hemoglobin A1c is wonderful at 6.4 send no diabetic medications needed.  You have some very minimal anemia but nothing severe and you have already had a colonoscopy in the past.  Cholesterol is still very good.   Written by Julian Hendricks DO on 1/26/2024  6:36 PM CST

## 2024-02-09 ENCOUNTER — OFFICE VISIT (OUTPATIENT)
Dept: PODIATRY CLINIC | Facility: CLINIC | Age: 84
End: 2024-02-09

## 2024-02-09 DIAGNOSIS — E11.9 TYPE 2 DIABETES MELLITUS WITHOUT COMPLICATION, WITHOUT LONG-TERM CURRENT USE OF INSULIN (HCC): Primary | ICD-10-CM

## 2024-02-09 PROCEDURE — 99203 OFFICE O/P NEW LOW 30 MIN: CPT | Performed by: STUDENT IN AN ORGANIZED HEALTH CARE EDUCATION/TRAINING PROGRAM

## 2024-02-09 NOTE — PROGRESS NOTES
[facility] Podiatry  Progress Note      Huseyin Lindsay is a 83 year old male.   Chief Complaint   Patient presents with    Diabetic Foot Care     Consult- nail trim and foot check- last AIC=6.4 on 1/26/2024- FBS this am was not taken - denies pain-was seen by Dr. Canales on 2/26/2020             HPI:     Patient is a pleasant 83-year-old diabetic male presents to clinic for bilateral foot evaluation.  Admits to going to a foot spac every 2 months for footcare      Allergies: Ibuprofen    Current Outpatient Medications   Medication Sig Dispense Refill    spironolactone 25 MG Oral Tab       metoprolol tartrate 25 MG Oral Tab Take 0.5 tablets (12.5 mg total) by mouth 2 (two) times daily. 90 tablet 2    amLODIPine 5 MG Oral Tab Take 1 tablet (5 mg total) by mouth daily. For blood pressure.  Went from 10 mg to 5 mg due to edema. 90 tablet 3    atorvastatin 80 MG Oral Tab Take 1 tablet (80 mg total) by mouth nightly. 90 tablet 3    furosemide 20 MG Oral Tab Take 1 tablet (20 mg total) by mouth daily with dinner. 90 tablet 3    aspirin 81 MG Oral Tab EC Take 1 tablet (81 mg total) by mouth daily. 90 tablet 3    clopidogrel 75 MG Oral Tab Take 1 tablet (75 mg total) by mouth daily. 90 tablet 3    tamsulosin 0.4 MG Oral Cap Take 1 capsule (0.4 mg total) by mouth daily. 30 MIN AFTER SAME MEAL 90 capsule 3    finasteride 5 MG Oral Tab Take 1 tablet (5 mg total) by mouth daily. 90 tablet 3    metroNIDAZOLE 0.75 % External Gel Use at bedtime to nose 60 g 1      Past Medical History:   Diagnosis Date    Benign prostatic hypertrophy     Cataract     Colon polyps 2013    Coronary artery disease     Deep vein thrombosis (HCC)     Diabetes type 2, controlled (HCC)     Borderline    Dysplastic nevus 2022    right upper back - mild dysplasia    Elevated PSA     High blood pressure     High cholesterol     Hypertension     Shortness of breath       Past Surgical History:   Procedure Laterality Date    CATARACT      CATH DRUG  ELUTING STENT      Not sure what type    COLONOSCOPY N/A 2020    Procedure: COLONOSCOPY;  Surgeon: Kendrick Naranjo MD;  Location: [facility] ENDOSCOPY    COLONOSCOPY      EGD  2020    HAND/FINGER SURGERY UNLISTED      Right index finger skin graft for injury    LIPOMA REMOVAL  7/15/2016    Left upper back     PT W/ CORONARY ARTERY STENT      UPPER GI ENDOSCOPY,EXAM        Family History   Problem Relation Age of Onset    Diabetes Paternal Aunt     Glaucoma Neg       Social History     Socioeconomic History    Marital status:     Number of children: 2   Occupational History    Occupation:      Comment: [employer]   Tobacco Use    Smoking status: Former     Types: Cigarettes     Quit date: 6/3/2007     Years since quittin.6    Smokeless tobacco: Never   Vaping Use    Vaping Use: Never used   Substance and Sexual Activity    Alcohol use: Not Currently     Alcohol/week: 0.0 standard drinks of alcohol     Comment: quit in     Drug use: No   Other Topics Concern    Caffeine Concern Yes    Reaction to local anesthetic No    Pt has a pacemaker Yes    Pt has a defibrillator No           REVIEW OF SYSTEMS:     Denies nause, fever, chills  No calf pain  Denies chest pain or SOB      EXAM:   There were no vitals taken for this visit.  GENERAL: well developed, well nourished, in no apparent distress  EXTREMITIES:   1. Integument: Normal skin temperature and turgor. Toenails x10 are elongated    2. Vascular: Dorsalis pedis two out of four bilateral and posterior tibial pulses two out of   four bilateral, capillary refill normal.   3. Musculoskeletal: All muscle groups are graded 5 out of 5 in the foot and ankle.   4. Neurological: Normal sharp dull sensation; reflexes normal.    Bilateral barefoot skin diabetic exam is normal, visualized feet and the appearance is normal.  Bilateral monofilament/sensation of both feet is normal.  Pulsation pedal pulse exam of both lower  legs/feet is normal as well.                 ASSESSMENT AND PLAN:   Diagnoses and all orders for this visit:    Type 2 diabetes mellitus without complication, without long-term current use of insulin ([facility])        Plan:       -Patient examined, chart history reviewed.  -Discussed importance of proper pedal hygiene, regular foot checks, and tight glucose control.  -Sharply debrided nails x10 with a sterile nail nipper achieving a 20% reduction in thickness and length, without incident.   -Ambulate with supportive shoes and inserts and avoid walking barefoot.  -Educated patient on acute signs of infection advised patient to seek immediate medical attention if symptoms arise.    RTC in 6 months      The patient indicates understanding of these issues and agrees to the plan.        Nereida Taveras DPM

## 2024-03-08 ENCOUNTER — TELEPHONE (OUTPATIENT)
Dept: NEPHROLOGY | Facility: CLINIC | Age: 84
End: 2024-03-08

## 2024-03-08 NOTE — TELEPHONE ENCOUNTER
Pt states he got a call that he needs to schedule an appt with Dr. Ever Sarabia within the month. The schedule is booked please call

## 2024-04-12 ENCOUNTER — OFFICE VISIT (OUTPATIENT)
Dept: DERMATOLOGY CLINIC | Facility: CLINIC | Age: 84
End: 2024-04-12
Payer: COMMERCIAL

## 2024-04-12 DIAGNOSIS — L82.1 SEBORRHEIC KERATOSES: ICD-10-CM

## 2024-04-12 DIAGNOSIS — L81.4 LENTIGO: Primary | ICD-10-CM

## 2024-04-12 DIAGNOSIS — Z13.89 ENCOUNTER FOR SURVEILLANCE OF ABNORMAL NEVI: ICD-10-CM

## 2024-04-12 DIAGNOSIS — L56.5 DSAP (DISSEMINATED SUPERFICIAL ACTINIC POROKERATOSIS): ICD-10-CM

## 2024-04-12 DIAGNOSIS — D23.9 BENIGN NEOPLASM OF SKIN, UNSPECIFIED LOCATION: ICD-10-CM

## 2024-04-12 DIAGNOSIS — Z86.018 HISTORY OF DYSPLASTIC NEVUS: ICD-10-CM

## 2024-04-12 PROCEDURE — 99213 OFFICE O/P EST LOW 20 MIN: CPT | Performed by: DERMATOLOGY

## 2024-04-28 NOTE — PROGRESS NOTES
Huseyin Lindsay is a 83 year old male.  HPI:     CC:    Chief Complaint   Patient presents with    Full Skin Exam     Patient present for a Full Body Check - LOV 23 - Patient has a hx of Dysplastic Nevus - No new spots or areas of concern         Allergies:  Ibuprofen    HISTORY:    Past Medical History:    Benign prostatic hypertrophy    Cataract    Colon polyps    Coronary artery disease    Deep vein thrombosis (HCC)    Diabetes type 2, controlled (HCC)    Borderline    Dysplastic nevus    right upper back - mild dysplasia    Elevated PSA    High blood pressure    High cholesterol    Hypertension    Shortness of breath      Past Surgical History:   Procedure Laterality Date    Cataract      Cath drug eluting stent      Not sure what type    Colonoscopy N/A 2020    Procedure: COLONOSCOPY;  Surgeon: Kendrick Naranjo MD;  Location: [facility] ENDOSCOPY    Colonoscopy      Egd  2020    Hand/finger surgery unlisted      Right index finger skin graft for injury    Lipoma removal  7/15/2016    Left upper back     Pt w/ coronary artery stent      Upper gi endoscopy,exam        Family History   Problem Relation Age of Onset    Diabetes Paternal Aunt     Glaucoma Neg       Social History     Socioeconomic History    Marital status:     Number of children: 2   Occupational History    Occupation:      Comment: [employer], [employer]   Tobacco Use    Smoking status: Former     Current packs/day: 0.00     Types: Cigarettes     Quit date: 6/3/2007     Years since quittin.9     Passive exposure: Past    Smokeless tobacco: Never   Vaping Use    Vaping status: Never Used   Substance and Sexual Activity    Alcohol use: Not Currently     Alcohol/week: 0.0 standard drinks of alcohol     Comment: quit in     Drug use: No   Other Topics Concern    Caffeine Concern Yes    Reaction to local anesthetic No    Pt has a pacemaker Yes    Pt has a defibrillator No     Social Determinants of  Health     Physical Activity: Sufficiently Active (4/16/2019)    [facility]    Exercise Vital Sign     Days of Exercise per Week: 5 days     Minutes of Exercise per Session: 30 min        Current Outpatient Medications   Medication Sig Dispense Refill    spironolactone 25 MG Oral Tab       metoprolol tartrate 25 MG Oral Tab Take 0.5 tablets (12.5 mg total) by mouth 2 (two) times daily. 90 tablet 2    amLODIPine 5 MG Oral Tab Take 1 tablet (5 mg total) by mouth daily. For blood pressure.  Went from 10 mg to 5 mg due to edema. 90 tablet 3    atorvastatin 80 MG Oral Tab Take 1 tablet (80 mg total) by mouth nightly. 90 tablet 3    furosemide 20 MG Oral Tab Take 1 tablet (20 mg total) by mouth daily with dinner. 90 tablet 3    aspirin 81 MG Oral Tab EC Take 1 tablet (81 mg total) by mouth daily. 90 tablet 3    clopidogrel 75 MG Oral Tab Take 1 tablet (75 mg total) by mouth daily. 90 tablet 3    tamsulosin 0.4 MG Oral Cap Take 1 capsule (0.4 mg total) by mouth daily. 30 MIN AFTER SAME MEAL 90 capsule 3    finasteride 5 MG Oral Tab Take 1 tablet (5 mg total) by mouth daily. 90 tablet 3    metroNIDAZOLE 0.75 % External Gel Use at bedtime to nose 60 g 1     Allergies:   Allergies   Allergen Reactions    Ibuprofen DIZZINESS       Past Medical History:    Benign prostatic hypertrophy    Cataract    Colon polyps    Coronary artery disease    Deep vein thrombosis (HCC)    Diabetes type 2, controlled (HCC)    Borderline    Dysplastic nevus    right upper back - mild dysplasia    Elevated PSA    High blood pressure    High cholesterol    Hypertension    Shortness of breath     Past Surgical History:   Procedure Laterality Date    Cataract      Cath drug eluting stent      Not sure what type    Colonoscopy N/A 2/5/2020    Procedure: COLONOSCOPY;  Surgeon: Kendrick Naranjo MD;  Location: [facility] ENDOSCOPY    Colonoscopy      Egd  02/04/2020    Hand/finger surgery unlisted       Right index finger skin graft for injury    Lipoma removal  7/15/2016    Left upper back     Pt w/ coronary artery stent  2015    Upper gi endoscopy,exam       Social History     Socioeconomic History    Marital status:      Spouse name: Not on file    Number of children: 2    Years of education: Not on file    Highest education level: Not on file   Occupational History    Occupation:      Comment: [place] [employer]   Tobacco Use    Smoking status: Former     Current packs/day: 0.00     Types: Cigarettes     Quit date: 6/3/2007     Years since quittin.9     Passive exposure: Past    Smokeless tobacco: Never   Vaping Use    Vaping status: Never Used   Substance and Sexual Activity    Alcohol use: Not Currently     Alcohol/week: 0.0 standard drinks of alcohol     Comment: quit in     Drug use: No    Sexual activity: Not on file   Other Topics Concern     Service Not Asked    Blood Transfusions Not Asked    Caffeine Concern Yes    Occupational Exposure Not Asked    Hobby Hazards Not Asked    Sleep Concern Not Asked    Stress Concern Not Asked    Weight Concern Not Asked    Special Diet Not Asked    Back Care Not Asked    Exercise Not Asked    Bike Helmet Not Asked    Seat Belt Not Asked    Self-Exams Not Asked    Grew up on a farm Not Asked    History of tanning Not Asked    Outdoor occupation Not Asked    Reaction to local anesthetic No    Pt has a pacemaker Yes    Pt has a defibrillator No   Social History Narrative    Not on file     Social Determinants of Health     Financial Resource Strain: Not on file   Food Insecurity: Not on file   Transportation Needs: Not on file   Physical Activity: Sufficiently Active (2019)    [facility]    Exercise Vital Sign     Days of Exercise per Week: 5 days     Minutes of Exercise per Session: 30 min   Stress: Not on file   Social Connections: Not on file   Housing Stability: Not on  file     Family History   Problem Relation Age of Onset    Diabetes Paternal Aunt     Glaucoma Neg        There were no vitals filed for this visit.    HPI:    Chief Complaint   Patient presents with    Full Skin Exam     Patient present for a Full Body Check - LOV 06-09-23 - Patient has a hx of Dysplastic Nevus - No new spots or areas of concern     Past notes/ records and appropriate/relevant lab results including pathology and past body maps reviewed. Updated and new information noted in current visit.     Follow-up biopsy dysplastic nevus at right upper back    Patient concerned with multiple pigmented lesions.  I patient with lots of sun in the past, originally from [place].  Patient's granddaughter studying medicine there currently.  Family wedding there this year, patient not planning on attending.  Lots of sun in the past.  More careful with sun exposure currently no recent travel  Patient presents with concerns above.    Patient has been in their usual state of health.  History, medications, allergies reviewed as noted.      ROS:  Denies any other systemic complaints.  No new or changeing lesions other than noted above. No fevers, chills, night sweats, unusual sun sensitivity.  No other skin complaints.        History, medications, allergies reviewed as noted.       Physical Examination:     Well-developed well-nourished patient alert oriented in no acute distress.  Exam total-body performed, including scalp, head, neck, face,nails, hair, external eyes, including conjunctival mucosa, eyelids, lips external ears, back, chest,/ breasts, axillae,  abdomen, arms, legs, palms.     Multiple light to medium brown, well marginated, uniformly pigmented, macules and papules 6 mm and less scattered on exam. pigmented lesions examined with dermoscopy benign-appearing patterns.     Waxy tannish keratotic papules scattered, cherry-red vascular papules scattered.    See map today's date for lesions noted .       Otherwise remarkable for lesions as noted on map.  See details of examination  See Assessment /Plan for additional history and physical exam also:    Assessment / plan:    No orders of the defined types were placed in this encounter.      Meds & Refills for this Visit:  Requested Prescriptions      No prescriptions requested or ordered in this encounter         Encounter Diagnoses   Name Primary?    Lentigo Yes    Seborrheic keratoses     Encounter for surveillance of abnormal nevi     History of dysplastic nevus     Benign neoplasm of skin, unspecified location     DSAP (disseminated superficial actinic porokeratosis)        See details on map.      Remarkable for:  Dysplastic nevus right upper back excised and biopsy no recurrence  Overall pigmented lesions are stable    Multiple skin tags lesion at right buttocks reassurance.    Numerous benign keratoses on the back and lentigines.  Waxy tan keratotic papules lesions in areas of concern as noted reassurance given.  Benign nature discussed.  Possibility of cryo, alphahydroxy acids over-the-counter retinol's discussed.    No other significant changes in exam no new atypical nevi.  Continue careful monitoring follow-up recheck in 6 months.  5/21 biopsy left medial brow irritated seborrheic keratosis    Lesion on nose dome-shaped pinkish papule consistent with fibrous papule left nasal sidewall recommend careful observation.  Asymptomatic.  Recheck at follow-up.    Scaling erythematous tan-brown papules right preauricular cheek inflamed keratoses versus pigmented AK's trial of cryo recheck at follow-up improved    Exam otherwise without significant changes  Cystic lesion at right nasolabial fold recommend observation.    Scattered inflammatory papules metronidazole  .Rosacea.  Meds in grid.  Skin care instructions reviewed.  Pathophysiology reviewed.  Chronic recurrent nature discussed.  Patient will let us know how they are doing over the next several weeks.   Await clinical response to above therapy.    Generalized actinic damage with multiple areas of lentigines ephelides.  Continue careful sun protection    Multiple tan-brown scaly waxy brownish like scaling patches consistent with disseminated superficial actinic porokeratosis.  Multiple lesions.  Discussed benign nature.  Continue moisturizers not bothersome alphahydroxy's discussed.  Retinol's.  Reassurance given.  Multiple lentigines benign keratoses reassurance no suspicious lesions.  Little sun damage over the face scalp arms hands.  Follow-up annually sun protection salicylic acid creams over-the-counter may be helpful.  Goldbond or CeraVe SA      Larger lentigines over the upper back continue careful monitoring as these do have slight variation in color consistent with seborrheic keratoses/lentigines on dermoscopy consider biopsy if these continue to grow.    No other new suspicious lesions  Please refer to map for specific lesions.  See additional diagnoses.  Pros cons of various therapies, risks benefits discussed.Pathophysiology discussed with patient.  Therapeutic options reviewed.  See  Medications in grid.  Instructions reviewed at length.    Benign nevi, seborrheic  keratoses, cherry angiomas:  Reassurance regarding other benign skin lesions.Signs and symptoms of skin cancer, ABCDE's of melanoma discussed with patient. Sunscreen use, sun protection, self exams reviewed.  Followup as noted RTC routine checkup 6 mos - one year or p.r.n.    The patient indicates understanding of these issues and agrees to the plan.  The patient is asked to return as noted in follow-up/ above.    This note was generated using Dragon voice recognition software.  Please contact me regarding any confusion resulting from errors in recognition.    Encounter Times  Including precharting, reviewing chart, prior notes obtaining history: 5 minutes, medical exam :10 minutes, notes on body map, plan, counseling 10minutes My total time  spent caring for the patient on the day of the encounter: 25 minutes

## 2024-05-03 DIAGNOSIS — E78.2 MIXED HYPERLIPIDEMIA: ICD-10-CM

## 2024-05-06 ENCOUNTER — TELEPHONE (OUTPATIENT)
Dept: NEPHROLOGY | Facility: CLINIC | Age: 84
End: 2024-05-06

## 2024-05-06 ENCOUNTER — LAB ENCOUNTER (OUTPATIENT)
Dept: LAB | Facility: HOSPITAL | Age: 84
End: 2024-05-06
Attending: INTERNAL MEDICINE
Payer: COMMERCIAL

## 2024-05-06 ENCOUNTER — TELEPHONE (OUTPATIENT)
Facility: CLINIC | Age: 84
End: 2024-05-06

## 2024-05-06 DIAGNOSIS — N17.9 AKI (ACUTE KIDNEY INJURY) (HCC): ICD-10-CM

## 2024-05-06 DIAGNOSIS — E87.5 HYPERKALEMIA: ICD-10-CM

## 2024-05-06 LAB
ALBUMIN SERPL-MCNC: 4.3 G/DL (ref 3.2–4.8)
ANION GAP SERPL CALC-SCNC: 3 MMOL/L (ref 0–18)
BUN BLD-MCNC: 33 MG/DL (ref 9–23)
BUN/CREAT SERPL: 24.1 (ref 10–20)
CALCIUM BLD-MCNC: 9.3 MG/DL (ref 8.7–10.4)
CHLORIDE SERPL-SCNC: 108 MMOL/L (ref 98–112)
CO2 SERPL-SCNC: 28 MMOL/L (ref 21–32)
CREAT BLD-MCNC: 1.37 MG/DL
EGFRCR SERPLBLD CKD-EPI 2021: 51 ML/MIN/1.73M2 (ref 60–?)
GLUCOSE BLD-MCNC: 95 MG/DL (ref 70–99)
OSMOLALITY SERPL CALC.SUM OF ELEC: 295 MOSM/KG (ref 275–295)
PHOSPHATE SERPL-MCNC: 3.4 MG/DL (ref 2.4–5.1)
POTASSIUM SERPL-SCNC: 6 MMOL/L (ref 3.5–5.1)
SODIUM SERPL-SCNC: 139 MMOL/L (ref 136–145)

## 2024-05-06 PROCEDURE — 80069 RENAL FUNCTION PANEL: CPT

## 2024-05-06 PROCEDURE — 36415 COLL VENOUS BLD VENIPUNCTURE: CPT

## 2024-05-06 RX ORDER — ATORVASTATIN CALCIUM 80 MG/1
80 TABLET, FILM COATED ORAL NIGHTLY
Qty: 90 TABLET | Refills: 3 | OUTPATIENT
Start: 2024-05-06

## 2024-05-06 NOTE — TELEPHONE ENCOUNTER
Dr Villasenor patient of Dr Ever Sarabia ,received a call from the lab has critical Potassium of 6.0.

## 2024-05-06 NOTE — TELEPHONE ENCOUNTER
Received a returned call from patient informed of note below  instructed patient to make sure to start the medication today and verbalized understanding.

## 2024-05-06 NOTE — TELEPHONE ENCOUNTER
Left message to patient to call back .Patient wife informed of note below and verbalized understanding.

## 2024-05-06 NOTE — TELEPHONE ENCOUNTER
Please have him stop his Aldactone.  Monitor potassium in his diet.  Recheck a renal panel next week.    I am ordering Lokelma.  He should take this daily for 3 days.

## 2024-05-08 ENCOUNTER — LAB ENCOUNTER (OUTPATIENT)
Dept: LAB | Facility: HOSPITAL | Age: 84
End: 2024-05-08
Attending: NURSE PRACTITIONER
Payer: COMMERCIAL

## 2024-05-08 DIAGNOSIS — I25.10 CORONARY ATHEROSCLEROSIS OF NATIVE CORONARY ARTERY: Primary | ICD-10-CM

## 2024-05-08 LAB
ANION GAP SERPL CALC-SCNC: 3 MMOL/L (ref 0–18)
BUN BLD-MCNC: 37 MG/DL (ref 9–23)
BUN/CREAT SERPL: 26.1 (ref 10–20)
CALCIUM BLD-MCNC: 9.1 MG/DL (ref 8.7–10.4)
CHLORIDE SERPL-SCNC: 109 MMOL/L (ref 98–112)
CO2 SERPL-SCNC: 28 MMOL/L (ref 21–32)
CREAT BLD-MCNC: 1.42 MG/DL
EGFRCR SERPLBLD CKD-EPI 2021: 49 ML/MIN/1.73M2 (ref 60–?)
FASTING STATUS PATIENT QL REPORTED: YES
GLUCOSE BLD-MCNC: 100 MG/DL (ref 70–99)
OSMOLALITY SERPL CALC.SUM OF ELEC: 299 MOSM/KG (ref 275–295)
POTASSIUM SERPL-SCNC: 5.6 MMOL/L (ref 3.5–5.1)
SODIUM SERPL-SCNC: 140 MMOL/L (ref 136–145)

## 2024-05-08 PROCEDURE — 36415 COLL VENOUS BLD VENIPUNCTURE: CPT

## 2024-05-08 PROCEDURE — 80048 BASIC METABOLIC PNL TOTAL CA: CPT

## 2024-05-11 ENCOUNTER — TELEPHONE (OUTPATIENT)
Dept: NEPHROLOGY | Facility: CLINIC | Age: 84
End: 2024-05-11

## 2024-05-11 DIAGNOSIS — N18.30 STAGE 3 CHRONIC KIDNEY DISEASE, UNSPECIFIED WHETHER STAGE 3A OR 3B CKD (HCC): Primary | ICD-10-CM

## 2024-05-11 NOTE — TELEPHONE ENCOUNTER
Patient is schedule for an appointment on 5/21 - pls have him do the BMP in next 3-5 days - pls order

## 2024-05-13 NOTE — TELEPHONE ENCOUNTER
Called- unable to contact  Spoke with wife; related Dr. Ever Sarabia's message.      Dr. Ever Sarabia:  Wife stated he was not able to fill LOKELMA prescribed 5/6/24; insurance did not cover high cost.    Called pharmacy- asked to start prior auth

## 2024-05-16 ENCOUNTER — TELEPHONE (OUTPATIENT)
Dept: NEPHROLOGY | Facility: CLINIC | Age: 84
End: 2024-05-16

## 2024-05-16 NOTE — TELEPHONE ENCOUNTER
Called patient- Left voice message and asked to read my chart. Prescription sent to pharmacy for Roxanna.

## 2024-05-21 ENCOUNTER — OFFICE VISIT (OUTPATIENT)
Dept: NEPHROLOGY | Facility: CLINIC | Age: 84
End: 2024-05-21

## 2024-05-21 ENCOUNTER — LAB ENCOUNTER (OUTPATIENT)
Dept: LAB | Facility: HOSPITAL | Age: 84
End: 2024-05-21
Attending: INTERNAL MEDICINE

## 2024-05-21 VITALS
DIASTOLIC BLOOD PRESSURE: 55 MMHG | SYSTOLIC BLOOD PRESSURE: 122 MMHG | BODY MASS INDEX: 25.76 KG/M2 | WEIGHT: 170 LBS | HEART RATE: 84 BPM | HEIGHT: 68 IN

## 2024-05-21 DIAGNOSIS — E87.5 HYPERKALEMIA: ICD-10-CM

## 2024-05-21 DIAGNOSIS — N18.30 STAGE 3 CHRONIC KIDNEY DISEASE, UNSPECIFIED WHETHER STAGE 3A OR 3B CKD (HCC): Primary | ICD-10-CM

## 2024-05-21 DIAGNOSIS — N18.30 STAGE 3 CHRONIC KIDNEY DISEASE, UNSPECIFIED WHETHER STAGE 3A OR 3B CKD (HCC): ICD-10-CM

## 2024-05-21 LAB
ANION GAP SERPL CALC-SCNC: 5 MMOL/L (ref 0–18)
BUN BLD-MCNC: 23 MG/DL (ref 9–23)
BUN/CREAT SERPL: 18.7 (ref 10–20)
CALCIUM BLD-MCNC: 9.2 MG/DL (ref 8.7–10.4)
CHLORIDE SERPL-SCNC: 109 MMOL/L (ref 98–112)
CO2 SERPL-SCNC: 28 MMOL/L (ref 21–32)
CREAT BLD-MCNC: 1.23 MG/DL
EGFRCR SERPLBLD CKD-EPI 2021: 58 ML/MIN/1.73M2 (ref 60–?)
FASTING STATUS PATIENT QL REPORTED: YES
GLUCOSE BLD-MCNC: 104 MG/DL (ref 70–99)
OSMOLALITY SERPL CALC.SUM OF ELEC: 298 MOSM/KG (ref 275–295)
POTASSIUM SERPL-SCNC: 4.2 MMOL/L (ref 3.5–5.1)
SODIUM SERPL-SCNC: 142 MMOL/L (ref 136–145)

## 2024-05-21 PROCEDURE — 3008F BODY MASS INDEX DOCD: CPT | Performed by: INTERNAL MEDICINE

## 2024-05-21 PROCEDURE — 80048 BASIC METABOLIC PNL TOTAL CA: CPT

## 2024-05-21 PROCEDURE — 3074F SYST BP LT 130 MM HG: CPT | Performed by: INTERNAL MEDICINE

## 2024-05-21 PROCEDURE — 36415 COLL VENOUS BLD VENIPUNCTURE: CPT

## 2024-05-21 PROCEDURE — 99215 OFFICE O/P EST HI 40 MIN: CPT | Performed by: INTERNAL MEDICINE

## 2024-05-21 PROCEDURE — 3078F DIAST BP <80 MM HG: CPT | Performed by: INTERNAL MEDICINE

## 2024-05-21 NOTE — PROGRESS NOTES
Progress Note:     Patient is an 82-year-old male with past medical history of diabetes II x 20 yrs, hypertension, hyperlipidemia, coronary artery disease s/p PCI in July 2023, BPH s/p surgery who presented for follow up     Lab test showed BUNs/creatinine 47/1.46 mg/dL with a EGFR 48 mm/min.  Creatinine 0.98 mg/dL in June 2023.  Serum potassium high at 5.2.  Serum calcium within normal limits.  A1c mostly in 6 range.  UA unremarkable    Ex smoker and ex alcohol - stopped in 2007. No leg swelling no urinary complaints. No NSAIDs or herbal supplement or protein supplement     Energy and appetite good. Following low potassium diet. No leg swelling. Off of spironolactone      HISTORY:  Past Medical History:    Benign prostatic hypertrophy    Cataract    Colon polyps    Coronary artery disease    Deep vein thrombosis (HCC)    Diabetes type 2, controlled (HCC)    Borderline    Dysplastic nevus    right upper back - mild dysplasia    Elevated PSA    High blood pressure    High cholesterol    Hypertension    Shortness of breath      Past Surgical History:   Procedure Laterality Date    Cataract      Cath drug eluting stent      Not sure what type    Colonoscopy N/A 2/5/2020    Procedure: COLONOSCOPY;  Surgeon: Kendrick Naranjo MD;  Location: [facility] ENDOSCOPY    Colonoscopy      Egd  02/04/2020    Hand/finger surgery unlisted      Right index finger skin graft for injury    Lipoma removal  7/15/2016    Left upper back     Pt w/ coronary artery stent  2015    Upper gi endoscopy,exam        Family History   Problem Relation Age of Onset    Diabetes Paternal Aunt     Glaucoma Neg       Social History:   Social History     Socioeconomic History    Marital status:     Number of children: 2   Occupational History    Occupation:      [place] [employer]   Tobacco Use    Smoking status: Former     Current packs/day: 0.00     Types: Cigarettes     Quit date: 6/3/2007     Years since  quittin.9     Passive exposure: Past    Smokeless tobacco: Never   Vaping Use    Vaping status: Never Used   Substance and Sexual Activity    Alcohol use: Not Currently     Alcohol/week: 0.0 standard drinks of alcohol     Comment: quit in     Drug use: No   Other Topics Concern    Caffeine Concern Yes    Reaction to local anesthetic No    Pt has a pacemaker Yes    Pt has a defibrillator No     Social Determinants of Health     Physical Activity: Sufficiently Active (2019)    Received from [facility]    Exercise Vital Sign     Days of Exercise per Week: 5 days     Minutes of Exercise per Session: 30 min        Medications (Active prior to today's visit):  Current Outpatient Medications   Medication Sig Dispense Refill    patiromer (VELTASSA) 8.4 g Oral Powd Pack Take 1 packet (8.4 g total) by mouth Every Monday, Wednesday, and Friday.      metoprolol tartrate 25 MG Oral Tab Take 0.5 tablets (12.5 mg total) by mouth 2 (two) times daily. 90 tablet 2    amLODIPine 5 MG Oral Tab Take 1 tablet (5 mg total) by mouth daily. For blood pressure.  Went from 10 mg to 5 mg due to edema. 90 tablet 3    atorvastatin 80 MG Oral Tab Take 1 tablet (80 mg total) by mouth nightly. 90 tablet 3    furosemide 20 MG Oral Tab Take 1 tablet (20 mg total) by mouth daily with dinner. 90 tablet 3    aspirin 81 MG Oral Tab EC Take 1 tablet (81 mg total) by mouth daily. 90 tablet 3    clopidogrel 75 MG Oral Tab Take 1 tablet (75 mg total) by mouth daily. 90 tablet 3    tamsulosin 0.4 MG Oral Cap Take 1 capsule (0.4 mg total) by mouth daily. 30 MIN AFTER SAME MEAL 90 capsule 3    finasteride 5 MG Oral Tab Take 1 tablet (5 mg total) by mouth daily. 90 tablet 3    metroNIDAZOLE 0.75 % External Gel Use at bedtime to nose 60 g 1       Allergies:  Allergies   Allergen Reactions    Ibuprofen DIZZINESS         ROS:     Constitutional:  Negative for decreased activity, fever, irritability and  lethargy  ENMT:  Negative for ear drainage, hearing loss and nasal drainage  Eyes:  Negative for eye discharge and vision loss  Cardiovascular:  Negative for chest pain, sobs  Respiratory:  Negative for cough, dyspnea and wheezing  Gastrointestinal:  Negative for abdominal pain, constipation  Genitourinary:  Negative for dysuria and hematuria  Endocrine:  Negative for abnormal sleep patterns, increased activity  Hema/Lymph:  Negative for easy bleeding and easy bruising  Integumentary:  Negative for pruritus and rash  Musculoskeletal:  Negative for bone/joint symptoms  Neurological:  Negative for gait disturbance  Psychiatric:  Negative for inappropriate interaction and psychiatric symptoms      Vitals:    05/21/24 1426   BP: 122/55   Pulse: 84       Wt Readings from Last 6 Encounters:   05/21/24 170 lb (77.1 kg)   01/26/24 162 lb (73.5 kg)   01/16/24 168 lb (76.2 kg)   11/07/23 167 lb (75.8 kg)   08/12/23 166 lb 9.6 oz (75.6 kg)   07/18/23 170 lb (77.1 kg)       PHYSICAL EXAM:   Constitutional: appears well hydrated alert and responsive no acute distress noted  Head/Face: normocephalic  Eyes/Vision: normal extraocular motion is intact  Nose/Mouth/Throat:mucous membranes are moist   Neck/Thyroid: neck is supple without adenopathy  Lymphatic: no abnormal cervical, supraclavicular adenopathy is noted  Skin/Hair: no unusual rashes present  Back/Spine: no abnormalities noted  Musculoskeletal:  no deformities  Extremities: no edema  Neurological:  Grossly normal       ASSESSMENT/PLAN:     1, CKD stage III:  BUNs/creatinine 47/1.46 mg/dL with a EGFR 48 mm/min -> 1.23 mg/dl with an eGFR 58 ml/min   Creatinine 0.98 mg/dL in June 2023.  Rise in Creatinine post PCI and after starting diuretics - Cr trending down   Serum calcium within normal limits.    A1c mostly in 6 range.    UA unremarkable and UACR unremarkable  UPCR unremarkable   Renal US - showed echogenic kidneys with normal size and renal cysts.    2. Coronary artery  disease s/p PCI in July 2023:  On furosemide  Volume status euvolemic     3. HTN:  On furosemide 20 mg daily dose, amlodipine 5 mg daily, metoprolol 12.5 mg BID  Off of spironolactone for high potassium     4. Hyperkalemia: potassium is now 4.2   Following Low potassium diet   Was prescribed lokelma - not covered. On  veltassa - reduce to Monday and Friday   Off of spironolactone    Follow up in 4 months     Lab tests prior to next visit     Orders This Visit:  Orders Placed This Encounter   Procedures    Renal Function Panel    CBC With Differential With Platelet       Meds This Visit:  Requested Prescriptions      No prescriptions requested or ordered in this encounter       Imaging & Referrals:  None     5/21/2024  Marko Sarabia MD      No follow-ups on file.

## 2024-07-19 ENCOUNTER — LAB ENCOUNTER (OUTPATIENT)
Dept: LAB | Age: 84
End: 2024-07-19
Attending: FAMILY MEDICINE
Payer: COMMERCIAL

## 2024-07-19 ENCOUNTER — OFFICE VISIT (OUTPATIENT)
Dept: FAMILY MEDICINE CLINIC | Facility: CLINIC | Age: 84
End: 2024-07-19

## 2024-07-19 VITALS
WEIGHT: 168 LBS | SYSTOLIC BLOOD PRESSURE: 124 MMHG | HEIGHT: 68 IN | BODY MASS INDEX: 25.46 KG/M2 | HEART RATE: 63 BPM | TEMPERATURE: 97 F | DIASTOLIC BLOOD PRESSURE: 60 MMHG

## 2024-07-19 DIAGNOSIS — E78.2 MIXED HYPERLIPIDEMIA: ICD-10-CM

## 2024-07-19 DIAGNOSIS — R60.0 BILATERAL LEG EDEMA: ICD-10-CM

## 2024-07-19 DIAGNOSIS — R73.9 HYPERGLYCEMIA: Primary | ICD-10-CM

## 2024-07-19 DIAGNOSIS — I25.10 CORONARY ARTERY DISEASE INVOLVING NATIVE CORONARY ARTERY OF NATIVE HEART WITHOUT ANGINA PECTORIS: ICD-10-CM

## 2024-07-19 DIAGNOSIS — N39.41 URGE INCONTINENCE: ICD-10-CM

## 2024-07-19 DIAGNOSIS — R80.9 CONTROLLED TYPE 2 DIABETES MELLITUS WITH MICROALBUMINURIA, WITHOUT LONG-TERM CURRENT USE OF INSULIN (HCC): ICD-10-CM

## 2024-07-19 DIAGNOSIS — Z23 NEED FOR VACCINATION: ICD-10-CM

## 2024-07-19 DIAGNOSIS — I10 ESSENTIAL HYPERTENSION, BENIGN: ICD-10-CM

## 2024-07-19 DIAGNOSIS — N40.1 BENIGN PROSTATIC HYPERPLASIA WITH NOCTURIA: ICD-10-CM

## 2024-07-19 DIAGNOSIS — R73.9 HYPERGLYCEMIA: ICD-10-CM

## 2024-07-19 DIAGNOSIS — R35.1 BENIGN PROSTATIC HYPERPLASIA WITH NOCTURIA: ICD-10-CM

## 2024-07-19 DIAGNOSIS — E11.29 CONTROLLED TYPE 2 DIABETES MELLITUS WITH MICROALBUMINURIA, WITHOUT LONG-TERM CURRENT USE OF INSULIN (HCC): ICD-10-CM

## 2024-07-19 LAB
EST. AVERAGE GLUCOSE BLD GHB EST-MCNC: 134 MG/DL (ref 68–126)
HBA1C MFR BLD: 6.3 % (ref ?–5.7)

## 2024-07-19 PROCEDURE — 83036 HEMOGLOBIN GLYCOSYLATED A1C: CPT

## 2024-07-19 PROCEDURE — 3078F DIAST BP <80 MM HG: CPT | Performed by: FAMILY MEDICINE

## 2024-07-19 PROCEDURE — 90471 IMMUNIZATION ADMIN: CPT | Performed by: FAMILY MEDICINE

## 2024-07-19 PROCEDURE — 99214 OFFICE O/P EST MOD 30 MIN: CPT | Performed by: FAMILY MEDICINE

## 2024-07-19 PROCEDURE — 3074F SYST BP LT 130 MM HG: CPT | Performed by: FAMILY MEDICINE

## 2024-07-19 PROCEDURE — 36415 COLL VENOUS BLD VENIPUNCTURE: CPT

## 2024-07-19 PROCEDURE — 90750 HZV VACC RECOMBINANT IM: CPT | Performed by: FAMILY MEDICINE

## 2024-07-19 PROCEDURE — 3008F BODY MASS INDEX DOCD: CPT | Performed by: FAMILY MEDICINE

## 2024-07-19 RX ORDER — TAMSULOSIN HYDROCHLORIDE 0.4 MG/1
0.4 CAPSULE ORAL DAILY
Qty: 90 CAPSULE | Refills: 2 | Status: SHIPPED | OUTPATIENT
Start: 2024-07-19

## 2024-07-19 RX ORDER — FUROSEMIDE 20 MG/1
20 TABLET ORAL
Qty: 90 TABLET | Refills: 2 | Status: SHIPPED | OUTPATIENT
Start: 2024-07-19

## 2024-07-19 RX ORDER — ATORVASTATIN CALCIUM 80 MG/1
80 TABLET, FILM COATED ORAL NIGHTLY
Qty: 90 TABLET | Refills: 2 | Status: SHIPPED | OUTPATIENT
Start: 2024-07-19

## 2024-07-19 RX ORDER — AMLODIPINE BESYLATE 5 MG/1
5 TABLET ORAL DAILY
Qty: 90 TABLET | Refills: 2 | Status: SHIPPED | OUTPATIENT
Start: 2024-07-19

## 2024-07-19 NOTE — PROGRESS NOTES
Patient ID: Huseyin Lindsay is a 83 year old male.    HPI  Chief Complaint   Patient presents with    Medication Follow-Up     Doing well. HgA1c 6.4 and not on DM meds. [place] and active.         Wt Readings from Last 6 Encounters:   07/19/24 168 lb (76.2 kg)   05/21/24 170 lb (77.1 kg)   01/26/24 162 lb (73.5 kg)   01/16/24 168 lb (76.2 kg)   11/07/23 167 lb (75.8 kg)   08/12/23 166 lb 9.6 oz (75.6 kg)       BMI Readings from Last 6 Encounters:   07/19/24 25.54 kg/m²   05/21/24 25.85 kg/m²   01/26/24 24.63 kg/m²   01/16/24 25.54 kg/m²   11/07/23 25.39 kg/m²   08/12/23 25.33 kg/m²       BP Readings from Last 6 Encounters:   07/19/24 135/71   05/21/24 122/55   01/26/24 132/67   01/16/24 112/60   11/07/23 114/64   08/12/23 128/70         Review of Systems  No CP, SOB, palpitations.       Medical History:      Past Medical History:    Benign prostatic hypertrophy    Cataract    Colon polyps    Coronary artery disease    Deep vein thrombosis (HCC)    Diabetes type 2, controlled (HCC)    Borderline    Dysplastic nevus    right upper back - mild dysplasia    Elevated PSA    High blood pressure    High cholesterol    Hypertension    Shortness of breath       Past Surgical History:   Procedure Laterality Date    Cataract      Cath drug eluting stent      Not sure what type    Colonoscopy N/A 2/5/2020    Procedure: COLONOSCOPY;  Surgeon: Kendrick Naranjo MD;  Location: [facility] ENDOSCOPY    Colonoscopy      Egd  02/04/2020    Hand/finger surgery unlisted      Right index finger skin graft for injury    Lipoma removal  7/15/2016    Left upper back     Pt w/ coronary artery stent  2015    Upper gi endoscopy,exam            Current Outpatient Medications   Medication Sig Dispense Refill    patiromer (VELTASSA) 8.4 g Oral Powd Pack Take 1 packet (8.4 g total) by mouth every other day. 30 packet 1    metoprolol tartrate 25 MG Oral Tab Take 0.5 tablets (12.5 mg total) by mouth 2 (two) times daily. 90 tablet 2     amLODIPine 5 MG Oral Tab Take 1 tablet (5 mg total) by mouth daily. For blood pressure.  Went from 10 mg to 5 mg due to edema. 90 tablet 3    atorvastatin 80 MG Oral Tab Take 1 tablet (80 mg total) by mouth nightly. 90 tablet 3    furosemide 20 MG Oral Tab Take 1 tablet (20 mg total) by mouth daily with dinner. 90 tablet 3    aspirin 81 MG Oral Tab EC Take 1 tablet (81 mg total) by mouth daily. 90 tablet 3    clopidogrel 75 MG Oral Tab Take 1 tablet (75 mg total) by mouth daily. 90 tablet 3    tamsulosin 0.4 MG Oral Cap Take 1 capsule (0.4 mg total) by mouth daily. 30 MIN AFTER SAME MEAL 90 capsule 3    finasteride 5 MG Oral Tab Take 1 tablet (5 mg total) by mouth daily. 90 tablet 3    metroNIDAZOLE 0.75 % External Gel Use at bedtime to nose 60 g 1     Allergies:  Allergies   Allergen Reactions    Ibuprofen DIZZINESS        Physical Exam:       Physical Exam  Blood pressure 135/71, pulse 63, temperature 97 °F (36.1 °C), temperature source Temporal, height 5' 8\" (1.727 m), weight 168 lb (76.2 kg).  Vitals:    07/19/24 0759 07/19/24 0831   BP: 135/71 124/60   Pulse: 63    Temp: 97 °F (36.1 °C)    TempSrc: Temporal    Weight: 168 lb (76.2 kg)    Height: 5' 8\" (1.727 m)      Physical Exam   Constitutional: Patient is oriented to person, place, and time. Patient appears well-developed and well-nourished.   HENT:   Mouth/Throat: Mucous membranes are normal.   Neck: Normal range of motion. Neck supple. No thyromegaly   Cardiovascular: Normal rate, regular rhythm and normal heart sounds.    Pulmonary/Chest: Effort normal and breath sounds normal. No respiratory distress.   Abdominal: Normal appearance and bowel sounds are normal. There is    no tenderness.  There is no rigidity, no rebound, no guarding and negative  Pickard's sign.   Neurological: Patient is alert and oriented to person, place, and time.   Skin: Skin is warm and dry.   Psychiatric: Patient has a normal mood and affect.   Trace pretibial edema legs.   Vitals  reviewed.           Assessment/Plan:      Diagnoses and all orders for this visit:    Hyperglycemia  -     Hemoglobin A1C; Future  No diabetes, just watch.  Mixed hyperlipidemia  -     atorvastatin 80 MG Oral Tab; Take 1 tablet (80 mg total) by mouth nightly.  Compliant on meds  Coronary artery disease involving native coronary artery of native heart without angina pectoris  -     metoprolol tartrate 25 MG Oral Tab; Take 0.5 tablets (12.5 mg total) by mouth 2 (two) times daily.  No CP  Essential hypertension, benign  -     amLODIPine 5 MG Oral Tab; Take 1 tablet (5 mg total) by mouth daily. For blood pressure.  Went from 10 mg to 5 mg due to edema.  Controlled.   Bilateral leg edema  -     amLODIPine 5 MG Oral Tab; Take 1 tablet (5 mg total) by mouth daily. For blood pressure.  Went from 10 mg to 5 mg due to edema.  -     furosemide 20 MG Oral Tab; Take 1 tablet (20 mg total) by mouth daily with dinner.  Stable and sees nephro again in 10/24.   Benign prostatic hyperplasia with nocturia  -     tamsulosin 0.4 MG Oral Cap; Take 1 capsule (0.4 mg total) by mouth daily. 30 MIN AFTER SAME MEAL    Urge incontinence  -     tamsulosin 0.4 MG Oral Cap; Take 1 capsule (0.4 mg total) by mouth daily. 30 MIN AFTER SAME MEAL    Need for vaccination  -     Immunization Admin Counseling, 1st Component, 18 years and older  -     Zoster Vac (Shingrix) in office vaccine- Non-Medicare or Medicare with ABN        Referrals (if applicable)  No orders of the defined types were placed in this encounter.        Follow up if symptoms persist.  Take medicine (if given) as prescribed.  Approach to treatment discussed and patient/family member understands and agrees to plan.     No follow-ups on file.        Julian Hendricks DO  7/19/2024

## 2024-08-06 NOTE — PROGRESS NOTES
[facility] Urology  Follow Up Visit    HPI:   Huseyin Lindsay is a 83 year old male here today for BPH. The patient was last seen on 2023 by LINDA Galvan.     Patient states his symptoms are nocturia 1-2 times per night. PVR 21 mL. He is currently taking Tamsulosin 0.4 mg every day and Finasteride 5 mg every day. S/p greenlight laser vaporization with Dr Jonhson in 2022. Denies hematuria or dysuria.    Past Medical History:    Benign prostatic hypertrophy    Cataract    Colon polyps    Coronary artery disease    Deep vein thrombosis (HCC)    Diabetes type 2, controlled (HCC)    Borderline    Dysplastic nevus    right upper back - mild dysplasia    Elevated PSA    High blood pressure    High cholesterol    Hypertension    Shortness of breath     Past Surgical History:   Procedure Laterality Date    Cataract      Cath drug eluting stent      Not sure what type    Colonoscopy N/A 2020    Procedure: COLONOSCOPY;  Surgeon: Kendrick Naranjo MD;  Location: [facility] ENDOSCOPY    Colonoscopy      Egd  2020    Hand/finger surgery unlisted      Right index finger skin graft for injury    Lipoma removal  7/15/2016    Left upper back     Pt w/ coronary artery stent  2015    Upper gi endoscopy,exam       Family History   Problem Relation Age of Onset    Diabetes Paternal Aunt     Glaucoma Neg      Social History     Socioeconomic History    Marital status:     Number of children: 2   Occupational History    [place]up[place]:      [place] , [employer]   Tobacco Use    Smoking status: Former     Current packs/day: 0.00     Types: Cigarettes     Quit date: 6/3/2007     Years since quittin.1     Passive exposure: Past    Smokeless tobacco: Never   Vaping Use    Vaping status: Never Used   Substance and Sexual Activity    Alcohol use: Not Currently     Alcohol/week: 0.0 standard drinks of alcohol     Comment: quit in     Drug use: No   Other Topics Concern     Caffeine Concern Yes    Reaction to local anesthetic No    Pt has a pacemaker Yes    Pt has a defibrillator No     Social Determinants of Health     Physical Activity: Sufficiently Active (4/16/2019)    [facility]    Exercise Vital Sign     Days of Exercise per Week: 5 days     Minutes of Exercise per Session: 30 min     Current Outpatient Medications   Medication Sig Dispense Refill    amLODIPine 5 MG Oral Tab Take 1 tablet (5 mg total) by mouth daily. For blood pressure.  Went from 10 mg to 5 mg due to edema. 90 tablet 2    atorvastatin 80 MG Oral Tab Take 1 tablet (80 mg total) by mouth nightly. 90 tablet 2    furosemide 20 MG Oral Tab Take 1 tablet (20 mg total) by mouth daily with dinner. 90 tablet 2    metoprolol tartrate 25 MG Oral Tab Take 0.5 tablets (12.5 mg total) by mouth 2 (two) times daily. 90 tablet 2    tamsulosin 0.4 MG Oral Cap Take 1 capsule (0.4 mg total) by mouth daily. 30 MIN AFTER SAME MEAL 90 capsule 2    patiromer (VELTASSA) 8.4 g Oral Powd Pack Take 1 packet (8.4 g total) by mouth every other day. 30 packet 1    aspirin 81 MG Oral Tab EC Take 1 tablet (81 mg total) by mouth daily. 90 tablet 3    clopidogrel 75 MG Oral Tab Take 1 tablet (75 mg total) by mouth daily. 90 tablet 3    finasteride 5 MG Oral Tab Take 1 tablet (5 mg total) by mouth daily. 90 tablet 3    metroNIDAZOLE 0.75 % External Gel Use at bedtime to nose 60 g 1       Allergies: Ibuprofen    REVIEW OF SYSTEMS:  Review of Systems   All other systems reviewed and are negative.        EXAM:  /74   Pulse 61     Physical Exam  Constitutional:       Appearance: Normal appearance.   HENT:      Head: Normocephalic and atraumatic.      Mouth/Throat:      Mouth: Mucous membranes are moist.   Pulmonary:      Effort: Pulmonary effort is normal.   Abdominal:      General: Abdomen is flat.      Palpations: Abdomen is soft.   Skin:     General: Skin is warm and dry.   Neurological:       Mental Status: He is alert and oriented to person, place, and time.   Psychiatric:         Mood and Affect: Mood normal.         Behavior: Behavior normal.         Thought Content: Thought content normal.         Judgment: Judgment normal.          LABS:  Lab Results   Component Value Date    PSA 2.0 08/12/2017    TOTPSASCREEN 3.0 10/18/2014     Lab Results   Component Value Date    WBC 9.3 01/26/2024    RBC 4.29 01/26/2024    HGB 12.6 (L) 01/26/2024    HCT 38.6 (L) 01/26/2024    MCV 90.0 01/26/2024    MCH 29.4 01/26/2024    MCHC 32.6 01/26/2024    RDW 14.1 01/26/2024    .0 01/26/2024    MPV 9.3 05/26/2018     Lab Results   Component Value Date     (H) 05/21/2024    BUN 23 05/21/2024    BUNCREA 18.7 05/21/2024    CREATSERUM 1.23 05/21/2024    ANIONGAP 5 05/21/2024    GFRNAA 83 01/14/2022    GFRAA 96 01/14/2022    CA 9.2 05/21/2024     05/21/2024    K 4.2 05/21/2024     05/21/2024    CO2 28.0 05/21/2024     Lab Results   Component Value Date    PTP 14.0 07/10/2023    INR 1.09 07/10/2023       IMAGING:  No results found.    IMPRESSION:  BPH    PLAN:  - Continue Tamsulosin and Finasteride. Refills given.  - Follow-up in one year    LINDA Navarro  8/9/2024

## 2024-08-09 ENCOUNTER — OFFICE VISIT (OUTPATIENT)
Dept: SURGERY | Facility: CLINIC | Age: 84
End: 2024-08-09
Payer: COMMERCIAL

## 2024-08-09 VITALS — DIASTOLIC BLOOD PRESSURE: 74 MMHG | SYSTOLIC BLOOD PRESSURE: 149 MMHG | HEART RATE: 61 BPM

## 2024-08-09 DIAGNOSIS — R35.0 BENIGN PROSTATIC HYPERPLASIA WITH URINARY FREQUENCY: Primary | ICD-10-CM

## 2024-08-09 DIAGNOSIS — N40.1 BENIGN PROSTATIC HYPERPLASIA WITH URINARY FREQUENCY: Primary | ICD-10-CM

## 2024-08-09 LAB
APPEARANCE: CLEAR
BILIRUBIN: NEGATIVE
GLUCOSE (URINE DIPSTICK): NEGATIVE MG/DL
KETONES (URINE DIPSTICK): NEGATIVE MG/DL
LEUKOCYTES: NEGATIVE
MULTISTIX LOT#: ABNORMAL NUMERIC
NITRITE, URINE: NEGATIVE
OCCULT BLOOD: NEGATIVE
PH, URINE: 5.5 (ref 4.5–8)
PROTEIN (URINE DIPSTICK): 30 MG/DL
SPECIFIC GRAVITY: 1.02 (ref 1–1.03)
URINE-COLOR: YELLOW
UROBILINOGEN,SEMI-QN: 0.2 MG/DL (ref 0–1.9)

## 2024-08-09 PROCEDURE — 81002 URINALYSIS NONAUTO W/O SCOPE: CPT

## 2024-08-09 PROCEDURE — 3078F DIAST BP <80 MM HG: CPT

## 2024-08-09 PROCEDURE — 3077F SYST BP >= 140 MM HG: CPT

## 2024-08-09 PROCEDURE — 99212 OFFICE O/P EST SF 10 MIN: CPT

## 2024-08-09 RX ORDER — FINASTERIDE 5 MG/1
5 TABLET, FILM COATED ORAL DAILY
Qty: 90 TABLET | Refills: 3 | Status: SHIPPED | OUTPATIENT
Start: 2024-08-09

## 2024-08-09 RX ORDER — TAMSULOSIN HYDROCHLORIDE 0.4 MG/1
0.4 CAPSULE ORAL DAILY
Qty: 90 CAPSULE | Refills: 3 | Status: SHIPPED | OUTPATIENT
Start: 2024-08-09 | End: 2025-08-04

## 2024-11-06 ENCOUNTER — LAB ENCOUNTER (OUTPATIENT)
Dept: LAB | Facility: HOSPITAL | Age: 84
End: 2024-11-06
Attending: INTERNAL MEDICINE
Payer: COMMERCIAL

## 2024-11-06 DIAGNOSIS — N18.30 STAGE 3 CHRONIC KIDNEY DISEASE, UNSPECIFIED WHETHER STAGE 3A OR 3B CKD (HCC): ICD-10-CM

## 2024-11-06 LAB
ALBUMIN SERPL-MCNC: 4.3 G/DL (ref 3.2–4.8)
ANION GAP SERPL CALC-SCNC: 8 MMOL/L (ref 0–18)
BASOPHILS # BLD AUTO: 0.05 X10(3) UL (ref 0–0.2)
BASOPHILS NFR BLD AUTO: 0.5 %
BUN BLD-MCNC: 36 MG/DL (ref 9–23)
BUN/CREAT SERPL: 25.2 (ref 10–20)
CALCIUM BLD-MCNC: 9.9 MG/DL (ref 8.7–10.4)
CHLORIDE SERPL-SCNC: 106 MMOL/L (ref 98–112)
CO2 SERPL-SCNC: 27 MMOL/L (ref 21–32)
CREAT BLD-MCNC: 1.43 MG/DL
DEPRECATED RDW RBC AUTO: 49.9 FL (ref 35.1–46.3)
EGFRCR SERPLBLD CKD-EPI 2021: 49 ML/MIN/1.73M2 (ref 60–?)
EOSINOPHIL # BLD AUTO: 0.35 X10(3) UL (ref 0–0.7)
EOSINOPHIL NFR BLD AUTO: 3.4 %
ERYTHROCYTE [DISTWIDTH] IN BLOOD BY AUTOMATED COUNT: 15.7 % (ref 11–15)
GLUCOSE BLD-MCNC: 110 MG/DL (ref 70–99)
HCT VFR BLD AUTO: 41.4 %
HGB BLD-MCNC: 13.1 G/DL
IMM GRANULOCYTES # BLD AUTO: 0.03 X10(3) UL (ref 0–1)
IMM GRANULOCYTES NFR BLD: 0.3 %
LYMPHOCYTES # BLD AUTO: 1.59 X10(3) UL (ref 1–4)
LYMPHOCYTES NFR BLD AUTO: 15.6 %
MCH RBC QN AUTO: 27.3 PG (ref 26–34)
MCHC RBC AUTO-ENTMCNC: 31.6 G/DL (ref 31–37)
MCV RBC AUTO: 86.4 FL
MONOCYTES # BLD AUTO: 1.01 X10(3) UL (ref 0.1–1)
MONOCYTES NFR BLD AUTO: 9.9 %
NEUTROPHILS # BLD AUTO: 7.15 X10 (3) UL (ref 1.5–7.7)
NEUTROPHILS # BLD AUTO: 7.15 X10(3) UL (ref 1.5–7.7)
NEUTROPHILS NFR BLD AUTO: 70.3 %
OSMOLALITY SERPL CALC.SUM OF ELEC: 301 MOSM/KG (ref 275–295)
PHOSPHATE SERPL-MCNC: 3.4 MG/DL (ref 2.4–5.1)
PLATELET # BLD AUTO: 230 10(3)UL (ref 150–450)
POTASSIUM SERPL-SCNC: 5.5 MMOL/L (ref 3.5–5.1)
RBC # BLD AUTO: 4.79 X10(6)UL
SODIUM SERPL-SCNC: 141 MMOL/L (ref 136–145)
WBC # BLD AUTO: 10.2 X10(3) UL (ref 4–11)

## 2024-11-06 PROCEDURE — 85025 COMPLETE CBC W/AUTO DIFF WBC: CPT

## 2024-11-06 PROCEDURE — 36415 COLL VENOUS BLD VENIPUNCTURE: CPT

## 2024-11-06 PROCEDURE — 80069 RENAL FUNCTION PANEL: CPT

## 2024-11-08 NOTE — TELEPHONE ENCOUNTER
Rx request, please review and sign off if appropriate. Thank you.    Last seen: 5/21/24  Return to clinic 5 months,   No follow up visit scheduled.   Last refill: 6/24/24 #30 with 1 refills     show

## 2024-11-08 NOTE — TELEPHONE ENCOUNTER
patiromer (VELTASSA) 8.4 g Oral Powd Pack, Take 1 packet (8.4 g total) by mouth every other day., Disp: 30 packet, Rfl: 1  Resend new rx for 90 days to go thru insurance

## 2024-11-08 NOTE — TELEPHONE ENCOUNTER
Sent refill for 60 days.  Please have patient make follow-up appointment otherwise we will not be able to refill the prescriptions anymore

## 2024-11-14 ENCOUNTER — TELEPHONE (OUTPATIENT)
Dept: NEPHROLOGY | Facility: CLINIC | Age: 84
End: 2024-11-14

## 2024-11-14 NOTE — TELEPHONE ENCOUNTER
Patient is requesting for prior authorization   patiromer (VELTASSA) 8.4 g Oral Powd Pack   Please follow up

## 2024-11-14 NOTE — TELEPHONE ENCOUNTER
Patient needs prior auth thanks.      Medication PA Requested:           patiromer (VELTASSA)                                                     CoverMyMeds Used:  Key:  Quantity:30 packets  Day Supply:  Sig: Take 1 packet (8.4 g total) by mouth every other day.   DX Code:       Hyperkalemia E87.5 ,     Stage 3 chronic kidney disease, unspecified whether stage 3a or 3b CKD (HCC) N18.30                          CPT code (if applicable):   Case Number/Pending Ref#:

## 2024-11-21 NOTE — TELEPHONE ENCOUNTER
Rn called pharmacy to check if med Veltassa need prior auth spoke to [employer] insurance is asking for 90 days supply and Rn called to change quantity to 45 packets hopefully no need for PA.

## 2024-11-26 NOTE — TELEPHONE ENCOUNTER
Patient calling with an .  Patient states that he has not taken Veltassa since the beginning of November.  Pharmacy states insurance will only cover a 90 day supply.  2 prescriptions have been sent, one for 30 day and another for 60.  Patient wants a 90 day supply sent to [facility] in [place].  Patient is requesting a call back when corrected.  Please call       Current Outpatient Medications:     patiromer (VELTASSA) 8.4 g Oral Powd Pack, Take 1 packet (8.4 g total) by mouth every other day., Disp: 90 packet, Rfl: 0    See Closed telephone encounter's 11/8/24 and 11/11/24 .

## 2024-11-26 NOTE — TELEPHONE ENCOUNTER
Dr Ever Sarabia patient insurance is requesting 90 days to cover  meds ,scheduled appt on 3/12/25.

## 2024-12-31 ENCOUNTER — PATIENT OUTREACH (OUTPATIENT)
Dept: CASE MANAGEMENT | Age: 84
End: 2024-12-31

## 2024-12-31 NOTE — PROCEDURES
The office order to remove patient from the diabetes registry is Approved and finalized on December 31, 2024.

## 2025-02-08 DIAGNOSIS — E78.2 MIXED HYPERLIPIDEMIA: ICD-10-CM

## 2025-02-12 RX ORDER — ATORVASTATIN CALCIUM 80 MG/1
80 TABLET, FILM COATED ORAL NIGHTLY
Qty: 90 TABLET | Refills: 0 | Status: SHIPPED | OUTPATIENT
Start: 2025-02-12

## 2025-02-12 NOTE — TELEPHONE ENCOUNTER
Please Review. Protocol Failed; No Protocol         Lab Results   Component Value Date     ALT 19 01/26/2024     Requested Prescriptions   Pending Prescriptions Disp Refills    ATORVASTATIN 80 MG Oral Tab [Pharmacy Med Name: ATORVASTATIN 80 MG TABLET] 90 tablet 2     Sig: TAKE 1 TABLET BY MOUTH EVERY DAY AT NIGHT       Cholesterol Medication Protocol Failed - 2/12/2025  8:30 AM        Failed - ALT < 80     Lab Results   Component Value Date    ALT 19 01/26/2024             Failed - ALT resulted within past year        Failed - Lipid panel within past 12 months     Lab Results   Component Value Date    CHOLEST 65 01/26/2024    TRIG 52 01/26/2024    HDL 32 (L) 01/26/2024    LDL 19 01/26/2024    VLDL 7 01/26/2024    NONHDLC 33 01/26/2024             Passed - In person appointment or virtual visit in the past 12 mos or appointment in next 3 mos     Recent Outpatient Visits              6 months ago Benign prostatic hyperplasia with urinary frequency    [facility] Tawana Salinas APRN    Office Visit    6 months ago Hyperglycemia    [facility], Julian Bell DO    Office Visit    8 months ago Stage 3 chronic kidney disease, unspecified whether stage 3a or 3b CKD ([facility])    [facility] Marko Monteiro MD    Office Visit    10 months ago Lentigo    [facility] She Cruz MD    Office Visit    1 year ago Type 2 diabetes mellitus without complication, without long-term current use of insulin ([facility])    [facility] Nereida Taveras DPM    Office Visit          Future Appointments         Provider Department Appt Notes    In 4 weeks Marko Monteiro MD [facility]     In 1 month She Cruz MD [facility]  full body    In 2 months Julian Hendricks,  [facility] Physical  last px 1/26/2024    In 5 months Tawana Salinas APRN [facility]                     Passed - Medication is active on med list               Future Appointments         Provider Department Appt Notes    In 4 weeks Marko Monteiro MD [facility]     In 1 month She Cruz MD [facility] full body    In 2 months Julian Hendricks,  [facility] Physical  last px 1/26/2024    In 5 months Tawana Salinas APRN [facility]           Recent Outpatient Visits              6 months ago Benign prostatic hyperplasia with urinary frequency    [facility] Tawana Salinas APRN    Office Visit    6 months ago Hyperglycemia    [facility] Julian Hendricks DO    Office Visit    8 months ago Stage 3 chronic kidney disease, unspecified whether stage 3a or 3b CKD ([facility])    [facility] Marko Monteiro MD    Office Visit    10 months ago Lentigo    [facility] She Cruz MD    Office Visit    1 year ago Type 2 diabetes mellitus without complication, without long-term current use of insulin ([facility])    [facility] Nereida Taveras DPM    Office Visit

## 2025-03-12 ENCOUNTER — TELEPHONE (OUTPATIENT)
Dept: NEPHROLOGY | Facility: CLINIC | Age: 85
End: 2025-03-12

## 2025-03-12 ENCOUNTER — OFFICE VISIT (OUTPATIENT)
Dept: NEPHROLOGY | Facility: CLINIC | Age: 85
End: 2025-03-12
Payer: COMMERCIAL

## 2025-03-12 ENCOUNTER — LAB ENCOUNTER (OUTPATIENT)
Dept: LAB | Facility: HOSPITAL | Age: 85
End: 2025-03-12
Attending: INTERNAL MEDICINE
Payer: COMMERCIAL

## 2025-03-12 VITALS
SYSTOLIC BLOOD PRESSURE: 123 MMHG | WEIGHT: 170 LBS | BODY MASS INDEX: 26 KG/M2 | DIASTOLIC BLOOD PRESSURE: 60 MMHG | HEART RATE: 78 BPM

## 2025-03-12 DIAGNOSIS — E87.5 HYPERKALEMIA: ICD-10-CM

## 2025-03-12 DIAGNOSIS — N18.30 STAGE 3 CHRONIC KIDNEY DISEASE, UNSPECIFIED WHETHER STAGE 3A OR 3B CKD (HCC): Primary | ICD-10-CM

## 2025-03-12 DIAGNOSIS — I10 PRIMARY HYPERTENSION: ICD-10-CM

## 2025-03-12 DIAGNOSIS — N18.30 STAGE 3 CHRONIC KIDNEY DISEASE, UNSPECIFIED WHETHER STAGE 3A OR 3B CKD (HCC): ICD-10-CM

## 2025-03-12 LAB
ALBUMIN SERPL-MCNC: 4.3 G/DL (ref 3.2–4.8)
ANION GAP SERPL CALC-SCNC: 4 MMOL/L (ref 0–18)
BUN BLD-MCNC: 21 MG/DL (ref 9–23)
BUN/CREAT SERPL: 17.4 (ref 10–20)
CALCIUM BLD-MCNC: 9.3 MG/DL (ref 8.7–10.4)
CHLORIDE SERPL-SCNC: 106 MMOL/L (ref 98–112)
CO2 SERPL-SCNC: 31 MMOL/L (ref 21–32)
CREAT BLD-MCNC: 1.21 MG/DL
CREAT UR-SCNC: 148.1 MG/DL
EGFRCR SERPLBLD CKD-EPI 2021: 59 ML/MIN/1.73M2 (ref 60–?)
GLUCOSE BLD-MCNC: 101 MG/DL (ref 70–99)
MICROALBUMIN UR-MCNC: 23.8 MG/DL
MICROALBUMIN/CREAT 24H UR-RTO: 160.7 UG/MG (ref ?–30)
OSMOLALITY SERPL CALC.SUM OF ELEC: 295 MOSM/KG (ref 275–295)
PHOSPHATE SERPL-MCNC: 3.2 MG/DL (ref 2.4–5.1)
POTASSIUM SERPL-SCNC: 4.4 MMOL/L (ref 3.5–5.1)
SODIUM SERPL-SCNC: 141 MMOL/L (ref 136–145)

## 2025-03-12 PROCEDURE — 82043 UR ALBUMIN QUANTITATIVE: CPT

## 2025-03-12 PROCEDURE — 36415 COLL VENOUS BLD VENIPUNCTURE: CPT

## 2025-03-12 PROCEDURE — 82570 ASSAY OF URINE CREATININE: CPT

## 2025-03-12 PROCEDURE — 99214 OFFICE O/P EST MOD 30 MIN: CPT | Performed by: INTERNAL MEDICINE

## 2025-03-12 PROCEDURE — 3074F SYST BP LT 130 MM HG: CPT | Performed by: INTERNAL MEDICINE

## 2025-03-12 PROCEDURE — 3078F DIAST BP <80 MM HG: CPT | Performed by: INTERNAL MEDICINE

## 2025-03-12 PROCEDURE — 80069 RENAL FUNCTION PANEL: CPT

## 2025-03-12 NOTE — TELEPHONE ENCOUNTER
Dr Ever Sarabia informed of the note below and ordered Renal panel and urine microalbumin creatinine ratio,Rn generated order for labs and left message to pt to call back.

## 2025-03-12 NOTE — TELEPHONE ENCOUNTER
Pt walked in. He has appt today at 2pm with dr eloisa cazares. He said he went to laboratory to try and get his labs done but they told him he did not have any orders.     I advised for him to wait for an RN's call in regards to if he needs any labs done and if appt needs to be moved so they can have any results prior to his visit. Pt verbalized understanding

## 2025-03-12 NOTE — PROGRESS NOTES
Progress Note:     Patient is an 84 year-old male with past medical history of diabetes II x 20 yrs, hypertension, hyperlipidemia, coronary artery disease s/p PCI in 2023, BPH s/p surgery who presented for follow up     Lab test showed BUNs/creatinine 47/1.46 mg/dL with a EGFR 48 mm/min.  Creatinine 0.98 mg/dL in 2023.  Serum potassium high at 5.2.  Serum calcium within normal limits.  A1c mostly in 6 range.  UA unremarkable    Ex smoker and ex alcohol - stopped in .  No NSAIDs or herbal supplement or protein supplement     No leg swelling. No urinary complaints. Energy and appetite is good. BP stable       HISTORY:  Past Medical History:    Benign prostatic hypertrophy    Cataract    Colon polyps    Coronary artery disease    Deep vein thrombosis (HCC)    Diabetes type 2, controlled (HCC)    Borderline    Dysplastic nevus    right upper back - mild dysplasia    Elevated PSA    High blood pressure    High cholesterol    Hypertension    Shortness of breath      Past Surgical History:   Procedure Laterality Date    Cataract      Cath drug eluting stent      Not sure what type    Colonoscopy N/A 2020    Procedure: COLONOSCOPY;  Surgeon: Kendrick Naranjo MD;  Location: [facility] ENDOSCOPY    Colonoscopy      Egd  2020    Hand/finger surgery unlisted      Right index finger skin graft for injury    Lipoma removal  7/15/2016    Left upper back     Pt w/ coronary artery stent      Upper gi endoscopy,exam        Family History   Problem Relation Age of Onset    Diabetes Paternal Aunt     Glaucoma Neg       Social History:   Social History     Socioeconomic History    Marital status:     Number of children: 2   Occupational History    [place]upation:      [place] , [employer]   Tobacco Use    Smoking status: Former     Current packs/day: 0.00     Types: Cigarettes     Quit date: 6/3/2007     Years since quittin.7     Passive exposure: Past    Smokeless  tobacco: Never   Vaping Use    Vaping status: Never Used   Substance and Sexual Activity    Alcohol use: Not Currently     Alcohol/week: 0.0 standard drinks of alcohol     Comment: quit in 2007    Drug use: No   Other Topics Concern    Caffeine Concern Yes    Reaction to local anesthetic No    Pt has a pacemaker Yes    Pt has a defibrillator No        Medications (Active prior to today's visit):  Current Outpatient Medications   Medication Sig Dispense Refill    patiromer 8.4 g Oral Powd Pack Take 1 packet (8.4 g total) by mouth twice a week.      atorvastatin 80 MG Oral Tab Take 1 tablet (80 mg total) by mouth nightly. Pt due for physical  and labs 90 tablet 0    tamsulosin 0.4 MG Oral Cap Take 1 capsule (0.4 mg total) by mouth daily. Take 1/2 hour following the same meal each day 90 capsule 3    amLODIPine 5 MG Oral Tab Take 1 tablet (5 mg total) by mouth daily. For blood pressure.  Went from 10 mg to 5 mg due to edema. 90 tablet 2    furosemide 20 MG Oral Tab Take 1 tablet (20 mg total) by mouth daily with dinner. 90 tablet 2    metoprolol tartrate 25 MG Oral Tab Take 0.5 tablets (12.5 mg total) by mouth 2 (two) times daily. 90 tablet 2    aspirin 81 MG Oral Tab EC Take 1 tablet (81 mg total) by mouth daily. 90 tablet 3    clopidogrel 75 MG Oral Tab Take 1 tablet (75 mg total) by mouth daily. 90 tablet 3    finasteride 5 MG Oral Tab Take 1 tablet (5 mg total) by mouth daily. 90 tablet 3    metroNIDAZOLE 0.75 % External Gel Use at bedtime to nose 60 g 1       Allergies:  Allergies   Allergen Reactions    Ibuprofen DIZZINESS         ROS:     Constitutional:  Negative for decreased activity, fever, irritability and lethargy  ENMT:  Negative for ear drainage, hearing loss and nasal drainage  Eyes:  Negative for eye discharge and vision loss  Cardiovascular:  Negative for chest pain, sobs  Respiratory:  Negative for cough, dyspnea and wheezing  Gastrointestinal:  Negative for abdominal pain,  constipation  Genitourinary:  Negative for dysuria and hematuria  Endocrine:  Negative for abnormal sleep patterns, increased activity  Hema/Lymph:  Negative for easy bleeding and easy bruising  Integumentary:  Negative for pruritus and rash  Musculoskeletal:  Negative for bone/joint symptoms  Neurological:  Negative for gait disturbance  Psychiatric:  Negative for inappropriate interaction and psychiatric symptoms      Vitals:    03/12/25 1358   BP: 123/60   Pulse: 78         Wt Readings from Last 6 Encounters:   03/12/25 170 lb (77.1 kg)   07/19/24 168 lb (76.2 kg)   05/21/24 170 lb (77.1 kg)   01/26/24 162 lb (73.5 kg)   01/16/24 168 lb (76.2 kg)   11/07/23 167 lb (75.8 kg)       PHYSICAL EXAM:     Constitutional: appears well hydrated alert and responsive no acute distress noted  Head/Face: normocephalic  Eyes/Vision: normal extraocular motion is intact  Nose/Mouth/Throat:mucous membranes are moist   Neck/Thyroid: neck is supple without adenopathy  Lymphatic: no abnormal cervical, supraclavicular adenopathy is noted  Skin/Hair: no unusual rashes present  Back/Spine: no abnormalities noted  Musculoskeletal:  no deformities  Extremities: no edema  Neurological:  Grossly normal       ASSESSMENT/PLAN:     1, CKD stage III:  BUNs/creatinine 47/1.46 mg/dL with a EGFR 48 mm/min -> 1.23 mg/dl with an eGFR 58 ml/min   Creatinine 0.98 mg/dL in June 2023.  Rise in Creatinine post PCI and after starting diuretics - Cr trending down   Serum calcium within normal limits.    A1c mostly in 6 range.    UACR 160 - check UA   UPCR unremarkable   Renal US - showed echogenic kidneys with normal size and renal cysts.  Hgb 13.1 g/dl    2. Coronary artery disease s/p PCI in July 2023:  On furosemide  Volume status euvolemic     3. HTN:  On furosemide 20 mg daily dose, amlodipine 5 mg daily, metoprolol 12.5 mg BID  Off of spironolactone for high potassium     4. Hyperkalemia: potassium is now 4.4   Following Low potassium diet   Was  prescribed lokelma - not covered. On  veltassa - reduce to Monday and Friday   Off of spironolactone    Follow up in 6 months     Lab tests prior to next visit     Orders This Visit:  Orders Placed This Encounter   Procedures    Renal Function Panel    Urinalysis, Routine       Meds This Visit:  Requested Prescriptions      No prescriptions requested or ordered in this encounter       Imaging & Referrals:  None     3/12/2025  Marko Sarabia MD      No follow-ups on file.

## 2025-03-21 NOTE — PROGRESS NOTES
[facility] Urology  Follow Up Visit    HPI:   Huseyin Lindsay is a 84 year old male here today for BPH. The patient was last seen on 2024.     Patient states he is doing well. Denies bothersome urinary symptoms. He is currently taking Tamsulosin 0.4 mg every day and Finasteride 5 mg every day. S/p greenlight laser vaporization with Dr Johnson in 2022. Denies hematuria or dysuria.    The patient has a history of urethral stricture. This was dilated during his cystoscopy in 2022. He denies any worsening urinary stream.     Past Medical History:    Benign prostatic hypertrophy    Cataract    Colon polyps    Coronary artery disease    Deep vein thrombosis (HCC)    Diabetes type 2, controlled (HCC)    Borderline    Dysplastic nevus    right upper back - mild dysplasia    Elevated PSA    High blood pressure    High cholesterol    Hypertension    Shortness of breath     Past Surgical History:   Procedure Laterality Date    Cataract      Cath drug eluting stent      Not sure what type    Colonoscopy N/A 2020    Procedure: COLONOSCOPY;  Surgeon: Kendrick Naranjo MD;  Location: [facility] ENDOSCOPY    Colonoscopy      Egd  2020    Hand/finger surgery unlisted      Right index finger skin graft for injury    Lipoma removal  7/15/2016    Left upper back     Pt w/ coronary artery stent  2015    Upper gi endoscopy,exam       Family History   Problem Relation Age of Onset    Diabetes Paternal Aunt     Glaucoma Neg      Social History     Socioeconomic History    Marital status:     Number of children: 2   Occupational History    [place]up[place]:      [place] [employer]   Tobacco Use    Smoking status: Former     Current packs/day: 0.00     Types: Cigarettes     Quit date: 6/3/2007     Years since quittin.8     Passive exposure: Past    Smokeless tobacco: Never   Vaping Use    Vaping status: Never Used   Substance and Sexual Activity    Alcohol use: Not  Currently     Alcohol/week: 0.0 standard drinks of alcohol     Comment: quit in 2007    Drug use: No   Other Topics Concern    Caffeine Concern Yes    Reaction to local anesthetic No    Pt has a pacemaker Yes    Pt has a defibrillator No     Current Outpatient Medications   Medication Sig Dispense Refill    patiromer 8.4 g Oral Powd Pack Take 1 packet (8.4 g total) by mouth twice a week.      atorvastatin 80 MG Oral Tab Take 1 tablet (80 mg total) by mouth nightly. Pt due for physical  and labs 90 tablet 0    tamsulosin 0.4 MG Oral Cap Take 1 capsule (0.4 mg total) by mouth daily. Take 1/2 hour following the same meal each day 90 capsule 3    amLODIPine 5 MG Oral Tab Take 1 tablet (5 mg total) by mouth daily. For blood pressure.  Went from 10 mg to 5 mg due to edema. 90 tablet 2    furosemide 20 MG Oral Tab Take 1 tablet (20 mg total) by mouth daily with dinner. 90 tablet 2    metoprolol tartrate 25 MG Oral Tab Take 0.5 tablets (12.5 mg total) by mouth 2 (two) times daily. 90 tablet 2    aspirin 81 MG Oral Tab EC Take 1 tablet (81 mg total) by mouth daily. 90 tablet 3    clopidogrel 75 MG Oral Tab Take 1 tablet (75 mg total) by mouth daily. 90 tablet 3    finasteride 5 MG Oral Tab Take 1 tablet (5 mg total) by mouth daily. 90 tablet 3    metroNIDAZOLE 0.75 % External Gel Use at bedtime to nose 60 g 1       Allergies: Ibuprofen    REVIEW OF SYSTEMS:  Review of Systems   All other systems reviewed and are negative.        EXAM:  There were no vitals taken for this visit.    Physical Exam  Constitutional:       Appearance: Normal appearance.   HENT:      Head: Normocephalic and atraumatic.      Mouth/Throat:      Mouth: Mucous membranes are moist.   Pulmonary:      Effort: Pulmonary effort is normal.   Abdominal:      General: Abdomen is flat.      Palpations: Abdomen is soft.   Skin:     General: Skin is warm and dry.   Neurological:      Mental Status: He is alert and oriented to person, place, and time.    Psychiatric:         Mood and Affect: Mood normal.         Behavior: Behavior normal.         Thought Content: Thought content normal.         Judgment: Judgment normal.          LABS:  Lab Results   Component Value Date    PSA 2.0 08/12/2017    TOTPSASCREEN 3.0 10/18/2014     Lab Results   Component Value Date    WBC 10.2 11/06/2024    RBC 4.79 11/06/2024    HGB 13.1 11/06/2024    HCT 41.4 11/06/2024    MCV 86.4 11/06/2024    MCH 27.3 11/06/2024    MCHC 31.6 11/06/2024    RDW 15.7 (H) 11/06/2024    .0 11/06/2024    MPV 9.3 05/26/2018     Lab Results   Component Value Date     (H) 03/12/2025    BUN 21 03/12/2025    BUNCREA 17.4 03/12/2025    CREATSERUM 1.21 03/12/2025    ANIONGAP 4 03/12/2025    GFRNAA 83 01/14/2022    GFRAA 96 01/14/2022    CA 9.3 03/12/2025     03/12/2025    K 4.4 03/12/2025     03/12/2025    CO2 31.0 03/12/2025     Lab Results   Component Value Date    PTP 14.0 07/10/2023    INR 1.09 07/10/2023       IMAGING:  No results found.    IMPRESSION:  BPH  History of Urethral Stricture    PLAN:  - Continue Tamsulosin and Finasteride. Refills given.  - Follow-up in one year or as needed    LINDA Navarro  03/24/2025

## 2025-03-24 ENCOUNTER — OFFICE VISIT (OUTPATIENT)
Dept: SURGERY | Facility: CLINIC | Age: 85
End: 2025-03-24

## 2025-03-24 DIAGNOSIS — Z87.448 HISTORY OF URETHRAL STRICTURE: ICD-10-CM

## 2025-03-24 DIAGNOSIS — R35.1 BENIGN PROSTATIC HYPERPLASIA WITH NOCTURIA: Primary | ICD-10-CM

## 2025-03-24 DIAGNOSIS — N40.1 BENIGN PROSTATIC HYPERPLASIA WITH NOCTURIA: Primary | ICD-10-CM

## 2025-03-24 PROCEDURE — 99213 OFFICE O/P EST LOW 20 MIN: CPT

## 2025-03-24 RX ORDER — FINASTERIDE 5 MG/1
5 TABLET, FILM COATED ORAL DAILY
Qty: 90 TABLET | Refills: 3 | Status: SHIPPED | OUTPATIENT
Start: 2025-03-24

## 2025-03-24 RX ORDER — TAMSULOSIN HYDROCHLORIDE 0.4 MG/1
0.4 CAPSULE ORAL DAILY
Qty: 90 CAPSULE | Refills: 3 | Status: SHIPPED | OUTPATIENT
Start: 2025-03-24 | End: 2026-03-19

## 2025-04-11 ENCOUNTER — OFFICE VISIT (OUTPATIENT)
Dept: DERMATOLOGY CLINIC | Facility: CLINIC | Age: 85
End: 2025-04-11

## 2025-04-11 DIAGNOSIS — L56.5 DSAP (DISSEMINATED SUPERFICIAL ACTINIC POROKERATOSIS): ICD-10-CM

## 2025-04-11 DIAGNOSIS — D23.9 BENIGN NEOPLASM OF SKIN, UNSPECIFIED LOCATION: ICD-10-CM

## 2025-04-11 DIAGNOSIS — Z86.018 HISTORY OF DYSPLASTIC NEVUS: ICD-10-CM

## 2025-04-11 DIAGNOSIS — L82.1 SEBORRHEIC KERATOSES: ICD-10-CM

## 2025-04-11 DIAGNOSIS — Z13.89 ENCOUNTER FOR SURVEILLANCE OF ABNORMAL NEVI: ICD-10-CM

## 2025-04-11 DIAGNOSIS — L57.0 AK (ACTINIC KERATOSIS): ICD-10-CM

## 2025-04-11 DIAGNOSIS — L81.4 LENTIGO: ICD-10-CM

## 2025-04-11 DIAGNOSIS — D48.5 NEOPLASM OF UNCERTAIN BEHAVIOR OF SKIN: Primary | ICD-10-CM

## 2025-04-11 PROCEDURE — 88305 TISSUE EXAM BY PATHOLOGIST: CPT | Performed by: DERMATOLOGY

## 2025-04-11 PROCEDURE — 99213 OFFICE O/P EST LOW 20 MIN: CPT | Performed by: DERMATOLOGY

## 2025-04-11 PROCEDURE — 17000 DESTRUCT PREMALG LESION: CPT | Performed by: DERMATOLOGY

## 2025-04-11 PROCEDURE — 11102 TANGNTL BX SKIN SINGLE LES: CPT | Performed by: DERMATOLOGY

## 2025-04-11 RX ORDER — IMIQUIMOD 12.5 MG/.25G
CREAM TOPICAL
Qty: 12 EACH | Refills: 1 | Status: SHIPPED | OUTPATIENT
Start: 2025-04-11 | End: 2025-04-11

## 2025-04-11 RX ORDER — SPIRONOLACTONE 25 MG/1
TABLET ORAL
COMMUNITY
Start: 2024-11-06

## 2025-04-11 NOTE — PROGRESS NOTES
The following individual(s) verbally consented to be recorded using ambient AI listening technology and understand that they can each withdraw their consent to this listening technology at any point by asking the clinician to turn off or pause the recording:    Patient name: Huseyin Lindsay

## 2025-04-15 NOTE — PROGRESS NOTES
The pathology report from last visit showed     left posterior neck, shave biopsy:  - Solar lentigo  No atypia  Please log in test results, send biopsy results letter.  Pt to rtc 1 year or prn.

## 2025-04-20 NOTE — PROGRESS NOTES
Operative Report                     Shave/  Tangential biopsy     Clinical diagnosis:    Size of lesion:     Patient with irregular brown patch 1.3cm r/o atypical pigmented lesion left posterior neck,     Location:    Procedure:    With patient in appropriate position the skin of the above was scrubbed with alcohol.  Anesthesia was obtained with 1% Xylocaine with epinephrine.  The skin surrounding the lesion was placed under tension and the lesion was incised using a #15 scalpel blade.  The specimen was sent for histopathologic exam.    Hemostasis was obtained with electrocautery/aluminum chloride.  Estimated blood loss less than 2 cc.    Biopsy dressed with Polysporin, bandage.    Pressure dressing:   No    Complications: None    Written instructions given and reviewed with patient    Await pathology    Contact information reviewed.    Procedural physician:  She Cruz MD

## 2025-04-20 NOTE — PROGRESS NOTES
Huseyin Lindsay is a 84 year old male.  HPI:     CC:    Chief Complaint   Patient presents with    Full Skin Exam     LOV 24. Pt presents for FBSE.     Pt has hx of SK, Dysplastic Nevus   Pt has Pacemaker         Allergies:  Ibuprofen    HISTORY:    Past Medical History:    Benign prostatic hypertrophy    Cataract    Colon polyps    Coronary artery disease    Deep vein thrombosis (HCC)    Diabetes type 2, controlled (HCC)    Borderline    Dysplastic nevus    right upper back - mild dysplasia    Elevated PSA    High blood pressure    High cholesterol    Hypertension    Shortness of breath    Solar lentigo    posterior left neck- no atypia      Past Surgical History:   Procedure Laterality Date    Cataract      Cath drug eluting stent      Not sure what type    Colonoscopy N/A 2020    Procedure: COLONOSCOPY;  Surgeon: Kendrick Naranjo MD;  Location: [facility] ENDOSCOPY    Colonoscopy      Egd  2020    Hand/finger surgery unlisted      Right index finger skin graft for injury    Lipoma removal  7/15/2016    Left upper back     Pt w/ coronary artery stent      Upper gi endoscopy,exam        Family History   Problem Relation Age of Onset    Diabetes Paternal Aunt     Glaucoma Neg       Social History     Socioeconomic History    Marital status:     Number of children: 2   Occupational History    Occupation:      Comment: [employer]   Tobacco Use    Smoking status: Former     Current packs/day: 0.00     Types: Cigarettes     Quit date: 6/3/2007     Years since quittin.8     Passive exposure: Past    Smokeless tobacco: Never   Vaping Use    Vaping status: Never Used   Substance and Sexual Activity    Alcohol use: Not Currently     Alcohol/week: 0.0 standard drinks of alcohol     Comment: quit in     Drug use: No   Other Topics Concern    Caffeine Concern Yes    Reaction to local anesthetic No    Pt has a pacemaker Yes    Pt has a defibrillator No         Current Outpatient Medications   Medication Sig Dispense Refill    spironolactone 25 MG Oral Tab spironolactone 25 mg tablet, [RxNorm: 152085]      patiromer (VELTASSA) 8.4 g Oral Powd Pack Veltassa 8.4 gram oral powder packet, [RxNorm: 9129354]      finasteride 5 MG Oral Tab Take 1 tablet (5 mg total) by mouth daily. 90 tablet 3    tamsulosin 0.4 MG Oral Cap Take 1 capsule (0.4 mg total) by mouth daily. Take 1/2 hour following the same meal each day 90 capsule 3    patiromer 8.4 g Oral Powd Pack Take 1 packet (8.4 g total) by mouth twice a week.      atorvastatin 80 MG Oral Tab Take 1 tablet (80 mg total) by mouth nightly. Pt due for physical  and labs 90 tablet 0    amLODIPine 5 MG Oral Tab Take 1 tablet (5 mg total) by mouth daily. For blood pressure.  Went from 10 mg to 5 mg due to edema. 90 tablet 2    furosemide 20 MG Oral Tab Take 1 tablet (20 mg total) by mouth daily with dinner. 90 tablet 2    metoprolol tartrate 25 MG Oral Tab Take 0.5 tablets (12.5 mg total) by mouth 2 (two) times daily. 90 tablet 2    aspirin 81 MG Oral Tab EC Take 1 tablet (81 mg total) by mouth daily. 90 tablet 3    clopidogrel 75 MG Oral Tab Take 1 tablet (75 mg total) by mouth daily. 90 tablet 3    metroNIDAZOLE 0.75 % External Gel Use at bedtime to nose 60 g 1     Allergies:   Allergies   Allergen Reactions    Ibuprofen DIZZINESS       Past Medical History:    Benign prostatic hypertrophy    Cataract    Colon polyps    Coronary artery disease    Deep vein thrombosis (HCC)    Diabetes type 2, controlled (HCC)    Borderline    Dysplastic nevus    right upper back - mild dysplasia    Elevated PSA    High blood pressure    High cholesterol    Hypertension    Shortness of breath    Solar lentigo    posterior left neck- no atypia     Past Surgical History:   Procedure Laterality Date    Cataract      Cath drug eluting stent      Not sure what type    Colonoscopy N/A 2/5/2020    Procedure: COLONOSCOPY;  Surgeon: Kendrick Naranjo  MD Barry;  Location: [facility] ENDOSCOPY    Colonoscopy      Egd  2020    Hand/finger surgery unlisted      Right index finger skin graft for injury    Lipoma removal  7/15/2016    Left upper back     Pt w/ coronary artery stent  2015    Upper gi endoscopy,exam       Social History     Socioeconomic History    Marital status:      Spouse name: Not on file    Number of children: 2    Years of education: Not on file    Highest education level: Not on file   Occupational History    Occupation:      [place] [employer]   Tobacco Use    Smoking status: Former     Current packs/day: 0.00     Types: Cigarettes     Quit date: 6/3/2007     Years since quittin.8     Passive exposure: Past    Smokeless tobacco: Never   Vaping Use    Vaping status: Never Used   Substance and Sexual Activity    Alcohol use: Not Currently     Alcohol/week: 0.0 standard drinks of alcohol     Comment: quit in     Drug use: No    Sexual activity: Not on file   Other Topics Concern     Service Not Asked    Blood Transfusions Not Asked    Caffeine Concern Yes    Occupational Exposure Not Asked    Hobby Hazards Not Asked    Sleep Concern Not Asked    Stress Concern Not Asked    Weight Concern Not Asked    Special Diet Not Asked    Back Care Not Asked    Exercise Not Asked    Bike Helmet Not Asked    Seat Belt Not Asked    Self-Exams Not Asked    Grew up on a farm Not Asked    History of tanning Not Asked    Outdoor occupation Not Asked    Reaction to local anesthetic No    Pt has a pacemaker Yes    Pt has a defibrillator No   Social History Narrative    Not on file     Social Drivers of Health     Food Insecurity: Not on file   Transportation Needs: Not on file   Stress: Not on file   Housing Stability: Not on file     Family History   Problem Relation Age of Onset    Diabetes Paternal Aunt     Glaucoma Neg        There were no vitals filed for this visit.    HPI:    Chief Complaint   Patient presents  with    Full Skin Exam     LOV 04/12/24. Pt presents for FBSE.     Pt has hx of SK, Dysplastic Nevus   Pt has Pacemaker     Past notes/ records and appropriate/relevant lab results including pathology and past body maps reviewed. Updated and new information noted in current visit.     Follow-up biopsy dysplastic nevus at right upper back    Patient concerned with multiple pigmented lesions.  I patient with lots of sun in the past, originally from [place].  Patient's granddaughter studying medicine there currently.    Lots of sun in the past.  More careful with sun exposure currently was in [place] recently    Patient presents with concerns above.    Patient has been in their usual state of health.  History, medications, allergies reviewed as noted.      ROS:  Denies any other systemic complaints.  No new or changeing lesions other than noted above. No fevers, chills, night sweats, unusual sun sensitivity.  No other skin complaints.        History, medications, allergies reviewed as noted.       Physical Examination:     Well-developed well-nourished patient alert oriented in no acute distress.  Exam total-body performed, including scalp, head, neck, face,nails, hair, external eyes, including conjunctival mucosa, eyelids, lips external ears, back, chest,/ breasts, axillae,  abdomen, arms, legs, palms.     Multiple light to medium brown, well marginated, uniformly pigmented, macules and papules 6 mm and less scattered on exam. pigmented lesions examined with dermoscopy benign-appearing patterns.     Waxy tannish keratotic papules scattered, cherry-red vascular papules scattered.    See map today's date for lesions noted .      Otherwise remarkable for lesions as noted on map.  See details of examination  See Assessment /Plan for additional history and physical exam also:    Assessment / plan:    Orders Placed This Encounter   Procedures    Specimen to Pathology, Tissue [[facility] Pt to [facility] lab]       Meds &  Refills for this Visit:  Requested Prescriptions      No prescriptions requested or ordered in this encounter         Encounter Diagnoses   Name Primary?    Neoplasm of uncertain behavior of skin Yes    Lentigo     Seborrheic keratoses     Encounter for surveillance of abnormal nevi     History of dysplastic nevus     Benign neoplasm of skin, unspecified location     DSAP (disseminated superficial actinic porokeratosis)     AK (actinic keratosis)        See details on map.      Remarkable for:      Physical Exam  SKIN: Small spot on the center of the forehead. Enlarged sebaceous glands on the left cheek. Larger solar lentigines on the back with one area of concern. Hyperpigmented spot on the back, biopsy planned. Senile lentigines on the middle of the back. Xerotic areas on the skin. Sun-exposed area on the back of the neck.    Results  Procedure: Cryotherapy  Description: Liquid nitrogen was applied to a lesion on the center of the forehead.    Assessment & Plan  Abnormal skin lesion  A darker spot on the back is concerning for malignancy due to its appearance and his history of sun exposure and fair skin. A biopsy is warranted to rule out malignancy. Larger sun freckles on the back are uniform and not concerning.  - Perform biopsy of the darker spot on the back.    Actinic damage  Multiple age spots and xerosis are likely due to chronic sun exposure. His fair skin and history of sun exposure, including time spent in [place], increase the risk of actinic damage.  - Advise on sun protection measures, including regular use of sunscreen.    Sebaceous hyperplasia  Enlarged sebaceous glands on the left cheek are benign and not concerning.                 Patient with irregular brown patch 1.3cm r/o atypical pigmented lesion left posterior neck,     Shave/ tangential biopsy performed, operative note and consent in chart further plans pending pathology      Erythematous scaling keratotic papules noted at sites noted  on map  Actinic Keratoses.  Precancerous nature discussed. Sun protection, sunscreen/ blocks encouraged Lesions treated with cryo- .  Biopsy if not resolved.    Foreheadx1    Dysplastic nevus right upper back excised and biopsy no recurrence  Overall pigmented lesions are stable    Multiple skin tags lesion at right buttocks reassurance.    Numerous benign keratoses on the back and lentigines.  Waxy tan keratotic papules lesions in areas of concern as noted reassurance given.  Benign nature discussed.  Possibility of cryo, alphahydroxy acids over-the-counter retinol's discussed.    No other significant changes in exam no new atypical nevi.  Continue careful monitoring follow-up recheck in 6 months.  5/21 biopsy left medial brow irritated seborrheic keratosis    Lesion on nose dome-shaped pinkish papule consistent with fibrous papule left nasal sidewall recommend careful observation.  Asymptomatic.  Recheck at follow-up.    Scaling erythematous tan-brown papules right preauricular cheek inflamed keratoses versus pigmented AK's trial of cryo recheck at follow-up improved    Exam otherwise without significant changes  Cystic lesion at right nasolabial fold recommend observation.    Scattered inflammatory papules metronidazole  .Rosacea.  Meds in grid.  Skin care instructions reviewed.  Pathophysiology reviewed.  Chronic recurrent nature discussed.  Patient will let us know how they are doing over the next several weeks.  Await clinical response to above therapy.    Generalized actinic damage with multiple areas of lentigines ephelides.  Continue careful sun protection    Multiple tan-brown scaly waxy brownish like scaling patches consistent with disseminated superficial actinic porokeratosis.  Multiple lesions.  Discussed benign nature.  Continue moisturizers not bothersome alphahydroxy's discussed.  Retinol's.  Reassurance given.  Multiple lentigines benign keratoses reassurance no suspicious lesions.  Little sun  damage over the face scalp arms hands.  Follow-up annually sun protection salicylic acid creams over-the-counter may be helpful.  Goldbond or CeraVe SA      Larger lentigines over the upper back continue careful monitoring as these do have slight variation in color consistent with seborrheic keratoses/lentigines on dermoscopy consider biopsy if these continue to grow.    No other new suspicious lesions  Please refer to map for specific lesions.  See additional diagnoses.  Pros cons of various therapies, risks benefits discussed.Pathophysiology discussed with patient.  Therapeutic options reviewed.  See  Medications in grid.  Instructions reviewed at length.    Benign nevi, seborrheic  keratoses, cherry angiomas:  Reassurance regarding other benign skin lesions.    Monitor for new or changing lesions. Signs and symptoms of skin cancer, ABCDE's of melanoma ( additional information available at AAD.org, skincancer.org) Encourage Sunscreen (broad-spectrum, ideally mineral-based-UVA/UVB -SPF 30 or higher) use encouraged, sun protection/sun protective clothing, self exams reviewed Followup as noted RTC ---routine checkup 6 mos -one year or p.r.n.    Encounter Times   Including precharting, reviewing chart, prior notes obtaining history: 10 minutes, medical exam :10 minutes, notes on body map, plan, counseling 10minutes My total time spent caring for the patient on the day of the encounter: 30 minutes     The patient indicates understanding of these issues and agrees to the plan.  The patient is asked to return as noted in follow-up/ above.    This note was generated using Dragon voice recognition software.  Please contact me regarding any confusion resulting from errors in recognition..  Note to patient and family: The 21st Century Cures Act makes medical notes like these available to patients. However, be advised this is a medical document. It is intended as [id number] communication and monitoring of a patient's care  needs. It is written in medical language and may contain abbreviations or verbiage that are unfamiliar. It may appear blunt or direct. Medical documents are intended to carry relevant information, facts as evident and the clinical opinion of the practitioner.

## 2025-04-26 ENCOUNTER — LAB ENCOUNTER (OUTPATIENT)
Dept: LAB | Age: 85
End: 2025-04-26
Attending: FAMILY MEDICINE
Payer: COMMERCIAL

## 2025-04-26 ENCOUNTER — OFFICE VISIT (OUTPATIENT)
Dept: FAMILY MEDICINE CLINIC | Facility: CLINIC | Age: 85
End: 2025-04-26
Payer: COMMERCIAL

## 2025-04-26 VITALS
BODY MASS INDEX: 26.22 KG/M2 | TEMPERATURE: 98 F | SYSTOLIC BLOOD PRESSURE: 165 MMHG | HEART RATE: 63 BPM | HEIGHT: 68 IN | WEIGHT: 173 LBS | DIASTOLIC BLOOD PRESSURE: 81 MMHG

## 2025-04-26 DIAGNOSIS — Z00.00 ADULT GENERAL MEDICAL EXAM: Primary | ICD-10-CM

## 2025-04-26 DIAGNOSIS — R73.9 HYPERGLYCEMIA: ICD-10-CM

## 2025-04-26 DIAGNOSIS — E78.2 MIXED HYPERLIPIDEMIA: ICD-10-CM

## 2025-04-26 DIAGNOSIS — I10 ESSENTIAL HYPERTENSION, BENIGN: ICD-10-CM

## 2025-04-26 DIAGNOSIS — R35.1 BENIGN PROSTATIC HYPERPLASIA WITH NOCTURIA: ICD-10-CM

## 2025-04-26 DIAGNOSIS — N40.1 BENIGN PROSTATIC HYPERPLASIA WITH NOCTURIA: ICD-10-CM

## 2025-04-26 DIAGNOSIS — Z00.00 ADULT GENERAL MEDICAL EXAM: ICD-10-CM

## 2025-04-26 DIAGNOSIS — R60.0 BILATERAL LEG EDEMA: ICD-10-CM

## 2025-04-26 LAB
ALT SERPL-CCNC: 23 U/L (ref 10–49)
AST SERPL-CCNC: 19 U/L (ref ?–34)
BASOPHILS # BLD AUTO: 0.05 X10(3) UL (ref 0–0.2)
BASOPHILS NFR BLD AUTO: 0.5 %
CHOLEST SERPL-MCNC: 88 MG/DL (ref ?–200)
COMPLEXED PSA SERPL-MCNC: 2.1 NG/ML (ref ?–4)
DEPRECATED RDW RBC AUTO: 45.1 FL (ref 35.1–46.3)
EOSINOPHIL # BLD AUTO: 0.59 X10(3) UL (ref 0–0.7)
EOSINOPHIL NFR BLD AUTO: 6.2 %
ERYTHROCYTE [DISTWIDTH] IN BLOOD BY AUTOMATED COUNT: 14.1 % (ref 11–15)
EST. AVERAGE GLUCOSE BLD GHB EST-MCNC: 146 MG/DL (ref 68–126)
FASTING PATIENT LIPID ANSWER: YES
HBA1C MFR BLD: 6.7 % (ref ?–5.7)
HCT VFR BLD AUTO: 42.9 % (ref 39–53)
HDLC SERPL-MCNC: 39 MG/DL (ref 40–59)
HGB BLD-MCNC: 13.7 G/DL (ref 13–17.5)
IMM GRANULOCYTES # BLD AUTO: 0.02 X10(3) UL (ref 0–1)
IMM GRANULOCYTES NFR BLD: 0.2 %
LDLC SERPL CALC-MCNC: 35 MG/DL (ref ?–100)
LYMPHOCYTES # BLD AUTO: 1.51 X10(3) UL (ref 1–4)
LYMPHOCYTES NFR BLD AUTO: 15.9 %
MCH RBC QN AUTO: 27.8 PG (ref 26–34)
MCHC RBC AUTO-ENTMCNC: 31.9 G/DL (ref 31–37)
MCV RBC AUTO: 87.2 FL (ref 80–100)
MONOCYTES # BLD AUTO: 0.76 X10(3) UL (ref 0.1–1)
MONOCYTES NFR BLD AUTO: 8 %
NEUTROPHILS # BLD AUTO: 6.55 X10 (3) UL (ref 1.5–7.7)
NEUTROPHILS # BLD AUTO: 6.55 X10(3) UL (ref 1.5–7.7)
NEUTROPHILS NFR BLD AUTO: 69.2 %
NONHDLC SERPL-MCNC: 49 MG/DL (ref ?–130)
PLATELET # BLD AUTO: 230 10(3)UL (ref 150–450)
RBC # BLD AUTO: 4.92 X10(6)UL (ref 3.8–5.8)
TRIGL SERPL-MCNC: 57 MG/DL (ref 30–149)
TSI SER-ACNC: 1.13 UIU/ML (ref 0.55–4.78)
VLDLC SERPL CALC-MCNC: 8 MG/DL (ref 0–30)
WBC # BLD AUTO: 9.5 X10(3) UL (ref 4–11)

## 2025-04-26 PROCEDURE — 84443 ASSAY THYROID STIM HORMONE: CPT

## 2025-04-26 PROCEDURE — 85025 COMPLETE CBC W/AUTO DIFF WBC: CPT

## 2025-04-26 PROCEDURE — 84460 ALANINE AMINO (ALT) (SGPT): CPT

## 2025-04-26 PROCEDURE — 80061 LIPID PANEL: CPT

## 2025-04-26 PROCEDURE — 36415 COLL VENOUS BLD VENIPUNCTURE: CPT

## 2025-04-26 PROCEDURE — 84450 TRANSFERASE (AST) (SGOT): CPT

## 2025-04-26 PROCEDURE — 83036 HEMOGLOBIN GLYCOSYLATED A1C: CPT

## 2025-04-26 NOTE — PROGRESS NOTES
Patient ID: Huseyin Lindsay is a 84 year old male.         The following individual(s) verbally consented to be recorded using ambient AI listening technology and understand that they can each withdraw their consent to this listening technology at any point by asking the clinician to turn off or pause the recording:    Patient name: Huseyin Lindsay  Additional names:            HPI  Chief Complaint   Patient presents with    Routine Physical       History of Present Illness  Huseyin Lindsay is an 84 year old male with hypertension and chronic kidney disease who presents for routine follow-up.    He is currently taking furosemide once with dinner and amlodipine, which was reduced from 10 mg to 5 mg due to leg swelling. He previously took spironolactone but discontinued it due to high potassium levels.     His last nephrology appointment was on March 12, 2025, where his creatinine was 1.21, indicating stable kidney function. He has a follow-up urology appointment scheduled for March 2026. His last PSA test was in January of the previous year, with a result of 1.33.    No chest pain, trouble breathing, recent infections, or significant leg swelling, although there is some trace edema in the ankles. He has a history of high blood pressure readings, with today's reading being 165/81, which is higher than usual. Previous readings include 123/60 and 149/74.    [place] and walks about four blocks from the train to his workplace. He has no history of diabetes, although his blood sugar has been slightly elevated. He has not eaten today and plans to have his labs done.    Health Maintenance   Topic Date Due    Annual Depression Screening  01/01/2025    Annual Physical  01/26/2025    PSA  01/26/2025    COVID-19 Vaccine (7 - 2024-25 season) 03/28/2025    Influenza Vaccine  Completed    Fall Risk Screening (Annual)  Completed    Pneumococcal Vaccine: 50+ Years  Completed    Zoster Vaccines  Completed     Meningococcal B Vaccine  Aged Out       Results  LABS  Creatinine: 1.21 (03/12/2025)  PSA: 1.33 (01/2024)    =======================================================    Lab Results   Component Value Date    WBC 10.2 11/06/2024    RBC 4.79 11/06/2024    HGB 13.1 11/06/2024    HCT 41.4 11/06/2024    .0 11/06/2024    MPV 9.3 05/26/2018    MCV 86.4 11/06/2024    MCH 27.3 11/06/2024    MCHC 31.6 11/06/2024    RDW 15.7 (H) 11/06/2024    NEPRELIM 7.15 11/06/2024    NEUT 5.5 09/12/2015    LYMPH 1.7 09/12/2015    MON 0.8 09/12/2015    EOS 0.5 09/12/2015    BASO 0.1 09/12/2015    NEPERCENT 70.3 11/06/2024    LYPERCENT 15.6 11/06/2024    MOPERCENT 9.9 11/06/2024    EOPERCENT 3.4 11/06/2024    BAPERCENT 0.5 11/06/2024    NE 7.15 11/06/2024    LYMABS 1.59 11/06/2024    MOABSO 1.01 (H) 11/06/2024    EOABSO 0.35 11/06/2024    BAABSO 0.05 11/06/2024       Lab Results   Component Value Date     (H) 03/12/2025    BUN 21 03/12/2025    BUNCREA 17.4 03/12/2025    CREATSERUM 1.21 03/12/2025    ANIONGAP 4 03/12/2025    GFRNAA 83 01/14/2022    GFRAA 96 01/14/2022    CA 9.3 03/12/2025    OSMOCALC 295 03/12/2025    ALKPHO 89 01/26/2024    AST 17 01/26/2024    ALT 19 01/26/2024    ALKPHOS 69 06/21/2016    BILT 0.6 01/26/2024    TP 7.2 01/26/2024    ALB 4.3 03/12/2025    GLOBULIN 4.3 12/20/2022    AGRATIO 0.9 (L) 06/21/2016     03/12/2025    K 4.4 03/12/2025     03/12/2025    CO2 31.0 03/12/2025       Lab Results   Component Value Date     (H) 03/12/2025    BUN 21 03/12/2025    CREATSERUM 1.21 03/12/2025    BUNCREA 17.4 03/12/2025    ANIONGAP 4 03/12/2025    GFRAA 96 01/14/2022    GFRNAA 83 01/14/2022    CA 9.3 03/12/2025     03/12/2025    K 4.4 03/12/2025     03/12/2025    CO2 31.0 03/12/2025    OSMOCALC 295 03/12/2025       Lab Results   Component Value Date    COLORUR Light-Yellow 11/07/2023    CLARITY Clear 11/07/2023    SPECGRAVITY 1.020 08/09/2024    GLUUR Normal 11/07/2023    BILUR Negative  11/07/2023    KETUR Negative 11/07/2023    BLOODURINE Negative 11/07/2023    PHURINE 5.5 08/09/2024    PROUR Negative 11/07/2023    UROBILINOGEN Normal 11/07/2023    NITRITE Negative 08/09/2024    LEUUR Negative 11/07/2023    ASCACIDURINE Negative 08/31/2016    NMIC Microscopic not indicated 11/07/2023    WBCUR 2 09/06/2018    RBCUR 1 09/06/2018    HYALCASTURN 1 08/31/2016    BACUR Negative 09/06/2018    MICCOM Completed 08/31/2016     Lab Results   Component Value Date     (H) 07/19/2024    A1C 6.3 (H) 07/19/2024    A1C 6.4 (H) 01/26/2024    A1C 7.2 (H) 08/12/2023       Lab Results   Component Value Date    MALBP 23.80 03/12/2025    CREUR 148.10 03/12/2025    CREAURINE 140.8 06/21/2016    MIALBURINE 34.2 (H) 06/21/2016    MCRRATIOUR 242.9 (H) 06/21/2016       Lab Results   Component Value Date    CHOLEST 65 01/26/2024    TRIG 52 01/26/2024    HDL 32 (L) 01/26/2024    LDL 19 01/26/2024    VLDL 7 01/26/2024    NONHDLC 33 01/26/2024    CALCNONHDL 38 06/21/2016    CALCNONHDL 48 09/12/2015    CALCNONHDL 79 10/18/2014     TSH (mIU/mL)   Date Value   01/26/2024 0.763     TSH (S) (uIU/mL)   Date Value   09/12/2015 0.65       No results found for: \"B12\", \"VITB12\"    Lab Results   Component Value Date    IRON 15 (L) 02/03/2020       Lab Results   Component Value Date    SAT 11 (L) 08/12/2017       Lab Results   Component Value Date    IRON 15 (L) 02/03/2020    TRANSFERRIN 262 08/12/2017    TIBCP 346 08/12/2017    SAT 11 (L) 08/12/2017       Lab Results   Component Value Date    KHANH 17.1 (L) 02/03/2020       No results found for: \"VITD\", \"QVITD\", \"[id number]\"  Lab Results   Component Value Date    PSA 2.0 08/12/2017    TOTPSASCREEN 3.0 10/18/2014       =======================================================    Wt Readings from Last 6 Encounters:   04/26/25 173 lb (78.5 kg)   03/12/25 170 lb (77.1 kg)   07/19/24 168 lb (76.2 kg)   05/21/24 170 lb (77.1 kg)   01/26/24 162 lb (73.5 kg)   01/16/24 168 lb (76.2 kg)                BMI Readings from Last 6 Encounters:   25 26.30 kg/m²   25 25.85 kg/m²   24 25.54 kg/m²   24 25.85 kg/m²   24 24.63 kg/m²   24 25.54 kg/m²       BP Readings from Last 6 Encounters:   25 123/60   24 149/74   24 124/60   24 122/55   24 132/67   24 112/60         Review of Systems  No exertional cardiac chest pain or shortness of breath unless stated in HPI which would take precedence.  See HPI for further review of systems.    Past Medical History:    Benign prostatic hypertrophy    Cataract    Colon polyps    Coronary artery disease    Deep vein thrombosis (HCC)    Diabetes type 2, controlled (HCC)    Borderline    Dysplastic nevus    right upper back - mild dysplasia    Elevated PSA    High blood pressure    High cholesterol    Hypertension    Shortness of breath    Solar lentigo    posterior left neck- no atypia       Past Surgical History:   Procedure Laterality Date    Cataract      Cath drug eluting stent      Not sure what type    Colonoscopy N/A 2020    Procedure: COLONOSCOPY;  Surgeon: Kendrick Naranjo MD;  Location: [facility] ENDOSCOPY    Colonoscopy      Egd  2020    Hand/finger surgery unlisted      Right index finger skin graft for injury    Lipoma removal  7/15/2016    Left upper back     Pt w/ coronary artery stent      Upper gi endoscopy,exam         Social History     Socioeconomic History    Marital status:      Spouse name: Not on file    Number of children: 2    Years of education: Not on file    Highest education level: Not on file   Occupational History    Occupation:      Comment: [employer]   Tobacco Use    Smoking status: Former     Current packs/day: 0.00     Types: Cigarettes     Quit date: 6/3/2007     Years since quittin.9     Passive exposure: Past    Smokeless tobacco: Never   Vaping Use    Vaping status: Never Used   Substance and Sexual Activity    Alcohol  use: Not Currently     Alcohol/week: 0.0 standard drinks of alcohol     Comment: quit in 2007    Drug use: No    Sexual activity: Not on file   Other Topics Concern     Service Not Asked    Blood Transfusions Not Asked    Caffeine Concern Yes    Occupational Exposure Not Asked    Hobby Hazards Not Asked    Sleep Concern Not Asked    Stress Concern Not Asked    Weight Concern Not Asked    Special Diet Not Asked    Back Care Not Asked    Exercise Not Asked    Bike Helmet Not Asked    Seat Belt Not Asked    Self-Exams Not Asked    Grew up on a farm Not Asked    History of tanning Not Asked    Outdoor occupation Not Asked    Reaction to local anesthetic No    Pt has a pacemaker Yes    Pt has a defibrillator No   Social History Narrative    Not on file     Social Drivers of Health     Food Insecurity: Not on file   Transportation Needs: Not on file   Stress: Not on file   Housing Stability: Not on file          Current Outpatient Medications   Medication Sig Dispense Refill           patiromer (VELTASSA) 8.4 g Oral Powd Pack Veltassa 8.4 gram oral powder packet, [RxNorm: 8426618]      finasteride 5 MG Oral Tab Take 1 tablet (5 mg total) by mouth daily. 90 tablet 3    tamsulosin 0.4 MG Oral Cap Take 1 capsule (0.4 mg total) by mouth daily. Take 1/2 hour following the same meal each day 90 capsule 3    patiromer 8.4 g Oral Powd Pack Take 1 packet (8.4 g total) by mouth twice a week.      atorvastatin 80 MG Oral Tab Take 1 tablet (80 mg total) by mouth nightly. Pt due for physical  and labs 90 tablet 0    amLODIPine 5 MG Oral Tab Take 1 tablet (5 mg total) by mouth daily. For blood pressure.  Went from 10 mg to 5 mg due to edema. 90 tablet 2    furosemide 20 MG Oral Tab Take 1 tablet (20 mg total) by mouth daily with dinner. 90 tablet 2    metoprolol tartrate 25 MG Oral Tab Take 0.5 tablets (12.5 mg total) by mouth 2 (two) times daily. 90 tablet 2    aspirin 81 MG Oral Tab EC Take 1 tablet (81 mg total) by mouth  daily. 90 tablet 3    clopidogrel 75 MG Oral Tab Take 1 tablet (75 mg total) by mouth daily. 90 tablet 3    metroNIDAZOLE 0.75 % External Gel Use at bedtime to nose 60 g 1     Allergies:  Allergies   Allergen Reactions    Ibuprofen DIZZINESS      PHYSICAL EXAM:   Physical Exam  Physical Exam   Constitutional: He appears well-developed and well-nourished. No distress.   HENT:   Head: Normocephalic.   Right Ear: Tympanic membrane and ear canal normal.   Left Ear: Tympanic membrane and ear canal normal.   Mouth/Throat: Oropharynx is clear and moist and mucous membranes are normal.   Eyes: Conjunctivae and EOM are normal. Pupils are equal, round, and reactive to light.   Neck: Normal range of motion. Neck supple. No thyromegaly present.   Cardiovascular: Normal rate, regular rhythm and no  murmur heard.  Pulmonary/Chest: Effort normal and breath sounds normal. No respiratory distress.   Abdominal: Soft. Bowel sounds are normal. There is no hepatosplenomegaly. There is no tenderness.   Lymphadenopathy:     He has no cervical adenopathy.   Neurological: He is alert and oriented to person, place, and time. He has normal reflexes. No cranial nerve deficit.   Skin: Skin is warm and dry. No rash noted.   Psychiatric: He has a normal mood and affect.     Physical Exam  VITALS: BP- 165/81  HEENT: Nose without infection or obstruction.  EXTREMITIES: Trace edema in the ankles.  No cellulitis.  Vitals reviewed.    Height 5' 8\" (1.727 m), weight 173 lb (78.5 kg).         Vitals:    04/26/25 0829 04/26/25 0843   BP: (!) 182/80 (!) 165/81   BP Location: Right arm    Patient Position: Sitting    Cuff Size: adult    Pulse: 63    Temp: 97.5 °F (36.4 °C)    TempSrc: Tympanic    Weight: 173 lb (78.5 kg)    Height: 5' 8\" (1.727 m)            ASSESSMENT/PLAN:     Diagnoses and all orders for this visit:    Adult general medical exam  -     CBC With Differential With Platelet; Future  -     AST (SGOT); Future  -     ALT(SGPT); Future  -      Assay, Thyroid Stim Hormone; Future  -     Lipid Panel; Future  -     PSA (Screening) [E]; Future  I already reviewed the labs that were done by the nephrologist with him.  I would do another set of labs.  Mixed hyperlipidemia  Await results  Essential hypertension, benign  Patient Instructions                                      BLOOD PRESSURE CUFF    Get a blood pressure cuff that goes on the arm.  Do not get the wrist cuff.  Check your blood pressures preferably on your left arm daily at home.  Do this with your arm supported by a table, do not have it hanging down at your side.  Make sure you do this before you have any coffee or caffeine.  Go ahead and write these numbers down a piece of paper for me.  After you get about 10 to 14 days worth of numbers send them to me through [email or web address].  OMRON is a very reputable brand.       Benign prostatic hyperplasia with nocturia  Continue seeing urology and continue medications  Hyperglycemia  -     Hemoglobin A1C; Future  No diabetes  Bilateral leg edema  Trace edema but nothing significant will be need to adjust medications.      Referrals (if applicable)  No orders of the defined types were placed in this encounter.        Follow up if symptoms persist.  Take medicine (if given) as prescribed.  Approach to treatment discussed and patient/family member understands and agrees to plan.     No follow-ups on file.      Assessment & Plan  Hypertension  Blood pressure today was 165/81, higher than usual. Previous readings have been well-controlled on furosemide and amlodipine. Spironolactone was discontinued due to hyperkalemia. Plan to monitor blood pressure at home to determine if today's reading is an anomaly. Current antihypertensive regimen will remain unchanged until home readings are reviewed.  - Monitor blood pressure at home for 7-10 days and report readings  - Check blood pressure in the morning before caffeine intake  - Ensure arm is supported during  measurement    Chronic kidney disease, unspecified  Managed by nephrology with recent creatinine level at 1.21, indicating well-managed kidney function. Regular follow-up with nephrologist Dr. Ever Sarabia.  - Continue regular follow-up with nephrology  - Ensure kidney function tests are done as scheduled by nephrologist    Hyperkalemia due to spironolactone  Hyperkalemia attributed to spironolactone use. Spironolactone has been discontinued to prevent recurrence.  - Document spironolactone as an allergy due to hyperkalemia    Prostate enlargement  Managed with urology medications. Last PSA was 1.33, within normal limits. Regular follow-up with urologist scheduled for March 2026.  - Order PSA test  - Continue urology medications as prescribed  - Follow up with urologist as scheduled    Wellness Visit  Routine wellness visit for an 84-year-old male who is still working and active. No new complaints or issues reported. No diabetes, but blood sugar levels have been slightly elevated. No chest pain or trouble reported. Regular follow-up with specialists for kidney function and prostate health.  - Order routine lab tests including cholesterol and PSA  - Encourage continued follow-up with nephrology and urology    General Health Maintenance  Discussion on the importance of COVID booster vaccination given his age and active lifestyle.  - Recommend COVID booster vaccination at local pharmacy    Follow-up  Plan for follow-up based on lab results and home blood pressure monitoring.  - Review lab results once available  - Follow up based on home blood pressure readings  Julian Hendricks DO  4/26/2025      .

## 2025-04-26 NOTE — PATIENT INSTRUCTIONS
BLOOD PRESSURE CUFF    Get a blood pressure cuff that goes on the arm.  Do not get the wrist cuff.  Check your blood pressures preferably on your left arm daily at home.  Do this with your arm supported by a table, do not have it hanging down at your side.  Make sure you do this before you have any coffee or caffeine.  Go ahead and write these numbers down a piece of paper for me.  After you get about 10 to 14 days worth of numbers send them to me through [employer].  OMRON is a very reputable brand.

## 2025-04-29 PROBLEM — E11.29 CONTROLLED TYPE 2 DIABETES MELLITUS WITH MICROALBUMINURIA, WITHOUT LONG-TERM CURRENT USE OF INSULIN (HCC): Status: ACTIVE | Noted: 2025-04-29

## 2025-04-29 PROBLEM — R80.9 CONTROLLED TYPE 2 DIABETES MELLITUS WITH MICROALBUMINURIA, WITHOUT LONG-TERM CURRENT USE OF INSULIN (HCC): Status: RESOLVED | Noted: 2025-04-29 | Resolved: 2025-04-29

## 2025-04-29 PROBLEM — N17.9 ACUTE KIDNEY INJURY: Status: RESOLVED | Noted: 2020-02-03 | Resolved: 2025-04-29

## 2025-04-29 PROBLEM — R80.9 CONTROLLED TYPE 2 DIABETES MELLITUS WITH MICROALBUMINURIA, WITHOUT LONG-TERM CURRENT USE OF INSULIN (HCC): Status: ACTIVE | Noted: 2025-04-29

## 2025-04-29 PROBLEM — E11.29 CONTROLLED TYPE 2 DIABETES MELLITUS WITH MICROALBUMINURIA, WITHOUT LONG-TERM CURRENT USE OF INSULIN (HCC): Status: RESOLVED | Noted: 2025-04-29 | Resolved: 2025-04-29

## 2025-05-08 NOTE — TELEPHONE ENCOUNTER
Last office visit- 3/12/25    Return to clinic- 6 months    Follow up- no appointment scheduled    Last refill-

## 2025-05-08 NOTE — TELEPHONE ENCOUNTER
Current Medications[1]    patiromer (VELTASSA) 8.4 g Oral Powd Pack Veltassa 8.4 gram oral powder packet, [RxNorm: 1057015]         90 days per insurance        [1]   Current Outpatient Medications   Medication Sig Dispense Refill    patiromer (VELTASSA) 8.4 g Oral Powd Pack Veltassa 8.4 gram oral powder packet, [RxNorm: 4342073]      finasteride 5 MG Oral Tab Take 1 tablet (5 mg total) by mouth daily. 90 tablet 3    tamsulosin 0.4 MG Oral Cap Take 1 capsule (0.4 mg total) by mouth daily. Take 1/2 hour following the same meal each day 90 capsule 3    patiromer 8.4 g Oral Powd Pack Take 1 packet (8.4 g total) by mouth twice a week.      atorvastatin 80 MG Oral Tab Take 1 tablet (80 mg total) by mouth nightly. Pt due for physical  and labs 90 tablet 0    amLODIPine 5 MG Oral Tab Take 1 tablet (5 mg total) by mouth daily. For blood pressure.  Went from 10 mg to 5 mg due to edema. 90 tablet 2    furosemide 20 MG Oral Tab Take 1 tablet (20 mg total) by mouth daily with dinner. 90 tablet 2    metoprolol tartrate 25 MG Oral Tab Take 0.5 tablets (12.5 mg total) by mouth 2 (two) times daily. 90 tablet 2    aspirin 81 MG Oral Tab EC Take 1 tablet (81 mg total) by mouth daily. 90 tablet 3    clopidogrel 75 MG Oral Tab Take 1 tablet (75 mg total) by mouth daily. 90 tablet 3    metroNIDAZOLE 0.75 % External Gel Use at bedtime to nose 60 g 1

## 2025-05-13 ENCOUNTER — PATIENT MESSAGE (OUTPATIENT)
Dept: FAMILY MEDICINE CLINIC | Facility: CLINIC | Age: 85
End: 2025-05-13

## 2025-05-13 DIAGNOSIS — I25.10 CORONARY ARTERY DISEASE INVOLVING NATIVE CORONARY ARTERY OF NATIVE HEART WITHOUT ANGINA PECTORIS: ICD-10-CM

## 2025-05-13 NOTE — TELEPHONE ENCOUNTER
Please see patient E-mail and advise.   See attached in media.   Blood pressure readings documented in chart.

## 2025-05-17 DIAGNOSIS — E78.2 MIXED HYPERLIPIDEMIA: ICD-10-CM

## 2025-05-18 RX ORDER — METOPROLOL TARTRATE 25 MG/1
25 TABLET, FILM COATED ORAL 2 TIMES DAILY
Qty: 180 TABLET | Refills: 1 | Status: SHIPPED | OUTPATIENT
Start: 2025-05-18

## 2025-05-18 NOTE — TELEPHONE ENCOUNTER
His home blood pressures are too high.  Instead of taking one half of the metoprolol twice daily have him start taking 1 full tablet twice daily and I will send in a new prescription.  Then have him see me in about 6 weeks so we can see how the blood pressure is.

## 2025-05-20 RX ORDER — ATORVASTATIN CALCIUM 80 MG/1
80 TABLET, FILM COATED ORAL NIGHTLY
Qty: 90 TABLET | Refills: 3 | Status: SHIPPED | OUTPATIENT
Start: 2025-05-20

## 2025-05-20 NOTE — TELEPHONE ENCOUNTER
Pt c/o left leg pain spot above the knee on the outside x 3 days. It looks like a bruised spot around her veins, the size of a quarter.  Pt states itchy more than anything.  Denies redness or warmth to touch.  Pt reassured it doesn't sound like a blood clot. Advised could be varicose veins, and offered appt to verify. Pt declined. Pt states she is exercising and trying to lose weight. She wants to do that to see if helps symptoms.  Advised to call back if pain persists or gets worse. Pt verbalized understanding and agreement with plan of care.      Refill passed [facility] protocol.

## 2025-06-06 ENCOUNTER — MED REC SCAN ONLY (OUTPATIENT)
Dept: FAMILY MEDICINE CLINIC | Facility: CLINIC | Age: 85
End: 2025-06-06

## 2025-06-17 DIAGNOSIS — R60.0 BILATERAL LEG EDEMA: ICD-10-CM

## 2025-06-17 DIAGNOSIS — I25.10 CORONARY ARTERY DISEASE INVOLVING NATIVE CORONARY ARTERY OF NATIVE HEART WITHOUT ANGINA PECTORIS: ICD-10-CM

## 2025-06-18 RX ORDER — METOPROLOL TARTRATE 25 MG/1
12.5 TABLET, FILM COATED ORAL 2 TIMES DAILY
Qty: 90 TABLET | Refills: 2 | OUTPATIENT
Start: 2025-06-18

## 2025-06-18 RX ORDER — FUROSEMIDE 20 MG/1
20 TABLET ORAL
Qty: 90 TABLET | Refills: 3 | Status: SHIPPED | OUTPATIENT
Start: 2025-06-18

## 2025-06-18 NOTE — TELEPHONE ENCOUNTER
Metoprolol 25m month supply sent to [facility] in [place] on 2025  
Please review; protocol failed/No Protocol    No Active/Future labs to be completed      
No

## 2025-07-01 ENCOUNTER — NURSE ONLY (OUTPATIENT)
Dept: LAB | Facility: HOSPITAL | Age: 85
End: 2025-07-01
Attending: INTERNAL MEDICINE
Payer: COMMERCIAL

## 2025-07-01 DIAGNOSIS — Z01.818 PRE-OP TESTING: ICD-10-CM

## 2025-07-01 LAB
ANION GAP SERPL CALC-SCNC: 6 MMOL/L (ref 0–18)
ATRIAL RATE: 58 BPM
BUN BLD-MCNC: 23 MG/DL (ref 9–23)
BUN/CREAT SERPL: 18.5 (ref 10–20)
CALCIUM BLD-MCNC: 9.1 MG/DL (ref 8.7–10.4)
CHLORIDE SERPL-SCNC: 107 MMOL/L (ref 98–112)
CO2 SERPL-SCNC: 31 MMOL/L (ref 21–32)
CREAT BLD-MCNC: 1.24 MG/DL (ref 0.7–1.3)
DEPRECATED RDW RBC AUTO: 47 FL (ref 35.1–46.3)
EGFRCR SERPLBLD CKD-EPI 2021: 57 ML/MIN/1.73M2 (ref 60–?)
ERYTHROCYTE [DISTWIDTH] IN BLOOD BY AUTOMATED COUNT: 14.5 % (ref 11–15)
FASTING STATUS PATIENT QL REPORTED: YES
GLUCOSE BLD-MCNC: 104 MG/DL (ref 70–99)
HCT VFR BLD AUTO: 44 % (ref 39–53)
HGB BLD-MCNC: 13.7 G/DL (ref 13–17.5)
MCH RBC QN AUTO: 27.6 PG (ref 26–34)
MCHC RBC AUTO-ENTMCNC: 31.1 G/DL (ref 31–37)
MCV RBC AUTO: 88.7 FL (ref 80–100)
OSMOLALITY SERPL CALC.SUM OF ELEC: 302 MOSM/KG (ref 275–295)
P AXIS: 63 DEGREES
P-R INTERVAL: 170 MS
PLATELET # BLD AUTO: 234 10(3)UL (ref 150–450)
POTASSIUM SERPL-SCNC: 4.8 MMOL/L (ref 3.5–5.1)
Q-T INTERVAL: 440 MS
QRS DURATION: 142 MS
QTC CALCULATION (BEZET): 431 MS
R AXIS: 100 DEGREES
RBC # BLD AUTO: 4.96 X10(6)UL (ref 3.8–5.8)
SODIUM SERPL-SCNC: 144 MMOL/L (ref 136–145)
T AXIS: 26 DEGREES
VENTRICULAR RATE: 58 BPM
WBC # BLD AUTO: 9 X10(3) UL (ref 4–11)

## 2025-07-01 PROCEDURE — 93005 ELECTROCARDIOGRAM TRACING: CPT

## 2025-07-01 PROCEDURE — 93010 ELECTROCARDIOGRAM REPORT: CPT | Performed by: INTERNAL MEDICINE

## 2025-07-01 PROCEDURE — 36415 COLL VENOUS BLD VENIPUNCTURE: CPT

## 2025-07-01 PROCEDURE — 85027 COMPLETE CBC AUTOMATED: CPT

## 2025-07-01 PROCEDURE — 80048 BASIC METABOLIC PNL TOTAL CA: CPT

## 2025-07-03 ENCOUNTER — OFFICE VISIT (OUTPATIENT)
Dept: FAMILY MEDICINE CLINIC | Facility: CLINIC | Age: 85
End: 2025-07-03
Payer: COMMERCIAL

## 2025-07-03 VITALS
TEMPERATURE: 97 F | WEIGHT: 171.19 LBS | SYSTOLIC BLOOD PRESSURE: 160 MMHG | DIASTOLIC BLOOD PRESSURE: 78 MMHG | HEART RATE: 62 BPM | BODY MASS INDEX: 28 KG/M2

## 2025-07-03 DIAGNOSIS — I10 ESSENTIAL HYPERTENSION, BENIGN: Primary | ICD-10-CM

## 2025-07-03 DIAGNOSIS — I25.10 CORONARY ARTERY DISEASE INVOLVING NATIVE CORONARY ARTERY OF NATIVE HEART WITHOUT ANGINA PECTORIS: ICD-10-CM

## 2025-07-03 PROCEDURE — 3078F DIAST BP <80 MM HG: CPT | Performed by: FAMILY MEDICINE

## 2025-07-03 PROCEDURE — G2211 COMPLEX E/M VISIT ADD ON: HCPCS | Performed by: FAMILY MEDICINE

## 2025-07-03 PROCEDURE — 99214 OFFICE O/P EST MOD 30 MIN: CPT | Performed by: FAMILY MEDICINE

## 2025-07-03 PROCEDURE — 3077F SYST BP >= 140 MM HG: CPT | Performed by: FAMILY MEDICINE

## 2025-07-03 RX ORDER — OLMESARTAN MEDOXOMIL 20 MG/1
20 TABLET ORAL DAILY
Qty: 90 TABLET | Refills: 1 | Status: SHIPPED | OUTPATIENT
Start: 2025-07-03

## 2025-07-03 NOTE — PATIENT INSTRUCTIONS
I saw patient on July 3, 2025.  Systolic blood pressure was still at 160.  I started him on Benicar 20 mg daily and he will continue his other medications as directed.  I will then see him in 1 month to follow-up on blood pressure to see if it is controlled.

## 2025-07-03 NOTE — PROGRESS NOTES
Patient ID: Huseyin Lindsay is a 84 year old male.         The following individual(s) verbally consented to be recorded using ambient AI listening technology and understand that they can each withdraw their consent to this listening technology at any point by asking the clinician to turn off or pause the recording:    Patient name: Huseyin Lindsay  Additional names:           HPI  Chief Complaint   Patient presents with    Hypertension     F/u       History of Present Illness  Huseyin Lindsay is an 84 year old male with hypertension who presents for follow-up on blood pressure management.    His blood pressure remains elevated despite current treatment. He is taking metoprolol 25 mg twice daily, amlodipine 5 mg daily, and furosemide 20 mg twice daily. Previously, he increased his metoprolol dosage from half a tablet to a full tablet twice daily.    He recently saw a cardiologist on July 1st, who performed labs showing no anemia, normal kidney function, and normal sodium and potassium levels. His creatinine level was 1.24, indicating stable kidney function. There was no comment on his blood pressure during the visit.  They did not adjust his blood pressure medication at the time.    He has a scheduled interventional cardiac procedure with the cardiology group on July 8th.    Wt Readings from Last 6 Encounters:   07/03/25 171 lb 3.2 oz (77.7 kg)   04/26/25 173 lb (78.5 kg)   03/12/25 170 lb (77.1 kg)   07/19/24 168 lb (76.2 kg)   05/21/24 170 lb (77.1 kg)   01/26/24 162 lb (73.5 kg)       BMI Readings from Last 6 Encounters:   07/03/25 27.63 kg/m²   04/26/25 26.30 kg/m²   03/12/25 25.85 kg/m²   07/19/24 25.54 kg/m²   05/21/24 25.85 kg/m²   01/26/24 24.63 kg/m²       BP Readings from Last 6 Encounters:   07/03/25 (!) 168/72   04/26/25 (!) 165/81   03/12/25 123/60   08/09/24 149/74   07/19/24 124/60   05/21/24 122/55       Results  LABS  CBC: No anemia (07/01/2025)  Creatinine: 1.24 (07/01/2025)    Review of  Systems  No exertional cardiac chest pain or shortness of breath unless stated in HPI which would take precedence.  See HPI for further review of systems.        Medical History:      Past Medical History:    Benign prostatic hypertrophy    Cataract    Colon polyps    Coronary artery disease    Deep vein thrombosis (HCC)    Diabetes type 2, controlled (HCC)    Borderline    Dysplastic nevus    right upper back - mild dysplasia    Elevated PSA    High blood pressure    High cholesterol    Hypertension    Shortness of breath    Solar lentigo    posterior left neck- no atypia       Past Surgical History:   Procedure Laterality Date    Cataract      Cath drug eluting stent      Not sure what type    Colonoscopy N/A 2/5/2020    Procedure: COLONOSCOPY;  Surgeon: Kendrick Naranjo MD;  Location: [facility] ENDOSCOPY    Colonoscopy      Egd  02/04/2020    Hand/finger surgery unlisted      Right index finger skin graft for injury    Lipoma removal  7/15/2016    Left upper back     Pt w/ coronary artery stent  2015    Upper gi endoscopy,exam            Current Outpatient Medications   Medication Sig Dispense Refill    furosemide 20 MG Oral Tab Take 1 tablet (20 mg total) by mouth daily with dinner. (Patient taking differently: Take 1 tablet (20 mg total) by mouth in the morning and 1 tablet (20 mg total) in the evening.) 90 tablet 3    atorvastatin 80 MG Oral Tab Take 1 tablet (80 mg total) by mouth nightly. Pt due for physical  and labs 90 tablet 3    metoprolol tartrate 25 MG Oral Tab Take 1 tablet (25 mg total) by mouth 2 (two) times daily. 180 tablet 1    patiromer (VELTASSA) 8.4 g Oral Powd Pack Take 1 packet (8.4 g total) by mouth twice a week. 30 each 0    finasteride 5 MG Oral Tab Take 1 tablet (5 mg total) by mouth daily. 90 tablet 3    tamsulosin 0.4 MG Oral Cap Take 1 capsule (0.4 mg total) by mouth daily. Take 1/2 hour following the same meal each day (Patient taking differently: Take 1 capsule (0.4 mg total) by  mouth at bedtime. Take 1/2 hour following the same meal each day) 90 capsule 3    patiromer 8.4 g Oral Powd Pack Take 1 packet (8.4 g total) by mouth twice a week.      amLODIPine 5 MG Oral Tab Take 1 tablet (5 mg total) by mouth daily. For blood pressure.  Went from 10 mg to 5 mg due to edema. 90 tablet 2    aspirin 81 MG Oral Tab EC Take 1 tablet (81 mg total) by mouth daily. 90 tablet 3    clopidogrel 75 MG Oral Tab Take 1 tablet (75 mg total) by mouth daily. 90 tablet 3    metroNIDAZOLE 0.75 % External Gel Use at bedtime to nose 60 g 1     Allergies:  Allergies   Allergen Reactions    Ibuprofen DIZZINESS    Spironolactone OTHER (SEE COMMENTS)     Increased potassium levels.         Physical Exam:       Physical Exam  Blood pressure (!) 168/72, pulse 62, temperature 97.2 °F (36.2 °C), temperature source Tympanic, weight 171 lb 3.2 oz (77.7 kg).  Vitals:    07/03/25 0827 07/03/25 0839   BP: (!) 168/72 160/78   BP Location: Left arm    Patient Position: Sitting    Cuff Size: adult    Pulse: 62    Temp: 97.2 °F (36.2 °C)    TempSrc: Tympanic    Weight: 171 lb 3.2 oz (77.7 kg)      Physical Exam   Constitutional: Patient is oriented to person, place, and time. Patient appears well-developed and well-nourished. No distress.   HENT:   Head: Normocephalic and atraumatic.   Neck: Normal range of motion. Neck supple. No thyromegaly present.   Lymphadenopathy:     Has no cervical adenopathy.   Cardiovascular: Normal rate, regular rhythm and no murmur heard.  Pulmonary/Chest: Effort normal and breath sounds normal. No respiratory distress.   Neurological: Patient is alert and oriented to person, place, and time.   Skin: Skin is warm and dry.  No pallor or diaphoresis.  Psychiatric: Patient has a normal mood and affect.   Legs: No pitting edema.    Nursing note and vitals reviewed.           Physical Exam  VITALS: BP- 160/78  NECK: No cervical lymphadenopathy.  CARDIOVASCULAR: Normal heart rate and rhythm, S1 and S2 normal  without murmurs.      Assessment/Plan:        Diagnoses and all orders for this visit:    Essential hypertension, benign  -     olmesartan 20 MG Oral Tab; Take 1 tablet (20 mg total) by mouth daily. (For blood pressure and/or kidney protection.)  -     CARDIO - INTERNAL  Lets add Benicar and see if that helps bring the blood pressure down.  He agrees with that plan.  Would you start him on a small dose at 20 mg and then he will see me in 1 month.  Continue the same medications.  I did review the labs from cardiology that was just recently done.  Coronary artery disease involving native coronary artery of native heart without angina pectoris  -     CARDIO - INTERNAL        Referrals (if applicable)  Orders Placed This Encounter   Procedures    CARDIO - INTERNAL     Referral Priority:   Routine     Referral Type:   OFFICE VISIT     Referred to Provider:   Toby Barney MD     Requested Specialty:   CARDIOLOGY     Number of Visits Requested:   3         Follow up if symptoms persist.  Take medicine (if given) as prescribed.  Approach to treatment discussed and patient/family member understands and agrees to plan.     Return in about 1 month (around 8/3/2025) for High Blood Pressure followup.      Assessment & Plan  Hypertension  Systolic blood pressure remains elevated at 160 mmHg despite current antihypertensive regimen. Current medications include amlodipine 5 mg, furosemide 20 mg twice daily, and metoprolol 25 mg twice daily. An ACE inhibitor was considered, but olmesartan 20 mg daily was initiated to better control blood pressure due to insufficient response to current medications.  - Start olmesartan 20 mg daily  - Continue amlodipine 5 mg, furosemide 20 mg twice daily, metoprolol 25 mg twice daily  - Follow up in one month to assess blood pressure control    Cardiology Follow-up  Scheduled for a cardiac exam with the cardiologist on July 8th. Referral to Dr. Barney, the cardiologist, is confirmed in the system.  -  Ensure referral to Dr. Barney is in the system  - Attend cardiology appointment on July 8th    Julian Hendricks DO  7/3/2025

## 2025-07-04 DIAGNOSIS — I10 ESSENTIAL HYPERTENSION, BENIGN: ICD-10-CM

## 2025-07-04 DIAGNOSIS — R60.0 BILATERAL LEG EDEMA: ICD-10-CM

## 2025-07-08 ENCOUNTER — HOSPITAL ENCOUNTER (OUTPATIENT)
Dept: INTERVENTIONAL RADIOLOGY/VASCULAR | Facility: HOSPITAL | Age: 85
Discharge: HOME | End: 2025-07-08
Attending: NURSE PRACTITIONER | Admitting: INTERNAL MEDICINE
Payer: COMMERCIAL

## 2025-07-08 VITALS
HEART RATE: 68 BPM | BODY MASS INDEX: 27.64 KG/M2 | RESPIRATION RATE: 25 BRPM | TEMPERATURE: 98 F | WEIGHT: 172 LBS | DIASTOLIC BLOOD PRESSURE: 60 MMHG | HEIGHT: 66 IN | SYSTOLIC BLOOD PRESSURE: 139 MMHG | OXYGEN SATURATION: 98 %

## 2025-07-08 DIAGNOSIS — I50.30 (HFPEF) HEART FAILURE WITH PRESERVED EJECTION FRACTION (HCC): ICD-10-CM

## 2025-07-08 DIAGNOSIS — I25.10 CAD S/P PERCUTANEOUS CORONARY ANGIOPLASTY: ICD-10-CM

## 2025-07-08 DIAGNOSIS — R60.0 BILATERAL LEG EDEMA: ICD-10-CM

## 2025-07-08 DIAGNOSIS — Z98.61 CAD S/P PERCUTANEOUS CORONARY ANGIOPLASTY: ICD-10-CM

## 2025-07-08 DIAGNOSIS — R94.39 ABNORMAL STRESS TEST: ICD-10-CM

## 2025-07-08 DIAGNOSIS — I10 ESSENTIAL HYPERTENSION, BENIGN: ICD-10-CM

## 2025-07-08 DIAGNOSIS — Z01.818 PRE-OP TESTING: Primary | ICD-10-CM

## 2025-07-08 DIAGNOSIS — R06.02 SOB (SHORTNESS OF BREATH): ICD-10-CM

## 2025-07-08 LAB — GLUCOSE BLDC GLUCOMTR-MCNC: 95 MG/DL (ref 70–99)

## 2025-07-08 PROCEDURE — 99152 MOD SED SAME PHYS/QHP 5/>YRS: CPT | Performed by: INTERNAL MEDICINE

## 2025-07-08 PROCEDURE — 82962 GLUCOSE BLOOD TEST: CPT

## 2025-07-08 PROCEDURE — 36415 COLL VENOUS BLD VENIPUNCTURE: CPT

## 2025-07-08 PROCEDURE — 93458 L HRT ARTERY/VENTRICLE ANGIO: CPT | Performed by: INTERNAL MEDICINE

## 2025-07-08 RX ORDER — SODIUM CHLORIDE 9 MG/ML
3 INJECTION, SOLUTION INTRAVENOUS
Status: COMPLETED | OUTPATIENT
Start: 2025-07-08 | End: 2025-07-08

## 2025-07-08 RX ORDER — LIDOCAINE HYDROCHLORIDE 20 MG/ML
INJECTION, SOLUTION EPIDURAL; INFILTRATION; INTRACAUDAL; PERINEURAL
Status: COMPLETED
Start: 2025-07-08 | End: 2025-07-08

## 2025-07-08 RX ORDER — ASPIRIN 81 MG/1
324 TABLET, CHEWABLE ORAL ONCE
Status: COMPLETED | OUTPATIENT
Start: 2025-07-08 | End: 2025-07-08

## 2025-07-08 RX ORDER — CHLORHEXIDINE GLUCONATE 40 MG/ML
SOLUTION TOPICAL
Status: DISCONTINUED | OUTPATIENT
Start: 2025-07-08 | End: 2025-07-08

## 2025-07-08 RX ORDER — OLMESARTAN MEDOXOMIL 40 MG/1
40 TABLET ORAL DAILY
Qty: 30 TABLET | Refills: 3 | Status: SHIPPED | OUTPATIENT
Start: 2025-07-08

## 2025-07-08 RX ORDER — IOPAMIDOL 612 MG/ML
100 INJECTION, SOLUTION INTRAVASCULAR
Status: COMPLETED | OUTPATIENT
Start: 2025-07-08 | End: 2025-07-08

## 2025-07-08 RX ORDER — FUROSEMIDE 20 MG/1
20 TABLET ORAL 2 TIMES DAILY
Qty: 60 TABLET | Refills: 3 | Status: SHIPPED | OUTPATIENT
Start: 2025-07-08

## 2025-07-08 RX ORDER — AMLODIPINE BESYLATE 10 MG/1
10 TABLET ORAL DAILY
Qty: 30 TABLET | Refills: 3 | Status: SHIPPED | OUTPATIENT
Start: 2025-07-08

## 2025-07-08 RX ORDER — HYDRALAZINE HYDROCHLORIDE 20 MG/ML
10 INJECTION INTRAMUSCULAR; INTRAVENOUS ONCE
Status: COMPLETED | OUTPATIENT
Start: 2025-07-08 | End: 2025-07-08

## 2025-07-08 RX ORDER — MIDAZOLAM HYDROCHLORIDE 1 MG/ML
INJECTION INTRAMUSCULAR; INTRAVENOUS
Status: COMPLETED
Start: 2025-07-08 | End: 2025-07-08

## 2025-07-08 RX ORDER — HEPARIN SODIUM 1000 [USP'U]/ML
INJECTION, SOLUTION INTRAVENOUS; SUBCUTANEOUS
Status: DISCONTINUED
Start: 2025-07-08 | End: 2025-07-08 | Stop reason: WASHOUT

## 2025-07-08 RX ORDER — ASPIRIN 81 MG/1
TABLET, CHEWABLE ORAL
Status: COMPLETED
Start: 2025-07-08 | End: 2025-07-08

## 2025-07-08 RX ORDER — AMLODIPINE BESYLATE 5 MG/1
5 TABLET ORAL DAILY
Qty: 90 TABLET | Refills: 3 | OUTPATIENT
Start: 2025-07-08

## 2025-07-08 RX ORDER — HYDRALAZINE HYDROCHLORIDE 20 MG/ML
INJECTION INTRAMUSCULAR; INTRAVENOUS
Status: COMPLETED
Start: 2025-07-08 | End: 2025-07-08

## 2025-07-08 RX ADMIN — SODIUM CHLORIDE 3 ML/KG/HR: 9 INJECTION, SOLUTION INTRAVENOUS at 10:30:00

## 2025-07-08 RX ADMIN — ASPIRIN 243 MG: 81 TABLET, CHEWABLE ORAL at 10:30:00

## 2025-07-08 RX ADMIN — IOPAMIDOL 45 ML: 612 INJECTION, SOLUTION INTRAVASCULAR at 11:47:00

## 2025-07-08 RX ADMIN — HYDRALAZINE HYDROCHLORIDE 10 MG: 20 INJECTION INTRAMUSCULAR; INTRAVENOUS at 13:09:00

## 2025-07-08 NOTE — DISCHARGE INSTRUCTIONS
HOME CARE INSTRUCTIONS FOLLOWING CORONARY ANGIOGRAPHY      Activity  DO NOT drive after the procedure.  You may resume driving late the following day according to the nurse or physician's instructions  Plan on resting and relaxing tonight and tomorrow  Resume your normal activity after 48 hours, or as instructed by your physician  Avoid drinking alcohol for the next 24 hours  If the groin site was used, avoid repeated stair use and excessive walking for the next 24 hours  Do not lift anything over 10 pounds for 1 week    What is Normal?  A small lump at the procedure site associated with mild tenderness when touched  The procedure site may be bruised or discolored  There may be a small amount of drainage on the bandage    Special Instructions  Drink plenty of fluids during the next 24 hours to \"flush\" the contrast from your system  Do NOT take meformin/glucophage containing medications for 48 hours  The bandage is to remain in place for 24 hours  Keep the bandage clean and dry  Do NOT shower for 24 hours  After 24 hours, you must remove the bandage  You should shower after removing the bandage, and wash the procedure site gently with soap and water  DO NOT submerge the procedure site for 1 week (no bath tubs or pools)    When you should NOTIFY YOUR PHYSICIAN  Bleeding can occur at the procedure site - both on the outside of the skin and/or beneath the surface of the skin  Swelling or a large lump at the procedure site can occur, which may be accompanied by moderate to severe pain    If either of the above occurs, lie down flat.  Have someone apply pressure to the procedure site with both hands, as instructed by the nurse.  Hold pressure for 20 minutes and the bleeding should stop.  Notify your physician of the occurrence  If the bleeding does not stop, call 911 and continue to apply pressure    If you experience signs of a fever, temperature > 101°, chills, infection (redness, swelling, thick yellow drainage, or a  foul odor from the procedure site)  If you notice any numbness, tingling, or loss of feeling to your leg or foot or groin access  If you notice any numbness, tingling, or loss of feeling to your fingers or hand, if wrist access was utilized    May call on call PCI RN at [phone] for any problems or concerns    Closure device utilized  type of closure used:  Perclose     Additional Instructions:  Leave the dressing/band-aid over the site for 24 hours.   You may feel a \"pea or olive pit\" sized lumpand/or note mild tenderness in the groin area for approximately 5-7 days.         The collagen will be resorbed (broken down) by your body in approximately 6-12 weeks.     See appropriate pamphlet information   Other  You may resume your present diet, unless otherwise specified by your physician.  You may resume all of your medications as prescribed, unless otherwise directed by your physician.  A list of your medications was provided to you at discharge.   Gradually resume your previous aerobic exercise schedule as tolerated.

## 2025-07-08 NOTE — IVS NOTE
DISCHARGE NOTE      Pt is able to sit up and ambulate without difficulty.   Pt voided and tolerated fluids and food.   Procedural site remains dry and intact with good circulation, motion, and sensation.   No signs and symptoms of bleeding/hematoma noted.   Instruction provided, patient/family verbalizes understanding.   Dr. Barney spoke with patient/family post procedure.      Follow up Appointment: July 15 with Alf SALDANA at 1030 am    Amlodipine, Olmesartan prescription sent and available for pick-up

## 2025-07-08 NOTE — INTERVAL H&P NOTE
Pre-op Diagnosis: * No pre-op diagnosis entered *    The above referenced H&P was reviewed by Toby Barney MD on 7/8/2025, the patient was examined and no significant changes have occurred in the patient's condition since the H&P was performed.  I discussed with the patient and/or legal representative the potential benefits, risks and side effects of this procedure; the likelihood of the patient achieving goals; and potential problems that might occur during recuperation.  I discussed reasonable alternatives to the procedure, including risks, benefits and side effects related to the alternatives and risks related to not receiving this procedure.  We will proceed with procedure as planned.

## 2025-07-08 NOTE — TELEPHONE ENCOUNTER
Please review. Protocol Failed; No Protocol    BP Readings from Last 3 Encounters:   07/03/25 160/78   04/26/25 (!) 165/81   03/12/25 123/60

## 2025-07-10 NOTE — PROCEDURES
[facility]  part of [facility]    Cardiac Cath Procedure Note  Huseyin Lindsay Patient Status:  Outpatient    1940 MRN [id number]   Location [facility] INTERVENTIONAL SUITES Attending No att. providers found   Hosp Day # 0 PCP Julian Hendricks DO       Cardiologist: Toby Barney MD  Primary Proceduralist: Toby Barney MD  Procedure Performed: LHC and LV  Date of Procedure: 2025   Indication: Positive stress test    Summary of procedure:  Non-obstructive CAD, long stented segment non-obstructive.  Distal LAD focal calcified stenosis 70% stenosis.      Left Ventriculography and hemodynamics:   LV EF not done  LV EDP 12 mmHg  No gradient across aortic valve        Coronary Angiography  RCA:  Dominant and free of obstructive disease, supplies PDA and PL    Left main:  Free of obstructive disease    Left anterior descending:  Stented segment non-obstructive.  Focal distal LAD calcified stenosis 70%.  Remainder of the LAD is free of obstructive disease, supplies multiple diagonals which are non-obstructive    Circumflex:  Free of obstructive disease, supplies multiple OM branches which are patent        Summary of Case: After written informed consent was obtained from the patient, patient was brought to the cardiac catheterization laboratory.  Patient was prepped and draped in the usual sterile fashion. Lidocaine 1% was used to infiltrate the right radial artery for local anesthesia and a 6 Tanzanian introducer sheath was inserted into the right radial artery.      Selective coronary angiography performed with JR4 catheter for RCA and JL3.5 catheter for LCA.  Angiography performed in standard projections.      6 Azeri JR4 catheter placed in LV for hemodynamics.    Specimen sent to: No specimen collected  Estimated blood loss: 10 cc  Closure:  TR band      IV was maintained by RN and moderate conscious sedation of versed and fentanyl was given.  Patient was assessed and monitoring of  oxygen, heart rate and blood pressure by nurse and myself during the exam 35 minutes.      Toby Barney MD  07/09/25

## 2025-08-08 ENCOUNTER — LAB ENCOUNTER (OUTPATIENT)
Dept: LAB | Age: 85
End: 2025-08-08
Attending: FAMILY MEDICINE

## 2025-08-08 ENCOUNTER — OFFICE VISIT (OUTPATIENT)
Dept: FAMILY MEDICINE CLINIC | Facility: CLINIC | Age: 85
End: 2025-08-08

## 2025-08-08 VITALS
BODY MASS INDEX: 28.19 KG/M2 | TEMPERATURE: 98 F | HEART RATE: 66 BPM | DIASTOLIC BLOOD PRESSURE: 58 MMHG | WEIGHT: 175.38 LBS | SYSTOLIC BLOOD PRESSURE: 121 MMHG | HEIGHT: 66 IN

## 2025-08-08 DIAGNOSIS — R60.0 BILATERAL LEG EDEMA: ICD-10-CM

## 2025-08-08 DIAGNOSIS — I10 ESSENTIAL HYPERTENSION: Primary | ICD-10-CM

## 2025-08-08 DIAGNOSIS — R73.9 HYPERGLYCEMIA: ICD-10-CM

## 2025-08-08 DIAGNOSIS — I10 ESSENTIAL HYPERTENSION: ICD-10-CM

## 2025-08-08 DIAGNOSIS — I25.10 CORONARY ARTERY DISEASE INVOLVING NATIVE CORONARY ARTERY OF NATIVE HEART WITHOUT ANGINA PECTORIS: ICD-10-CM

## 2025-08-08 DIAGNOSIS — E78.2 MIXED HYPERLIPIDEMIA: ICD-10-CM

## 2025-08-08 LAB
ANION GAP SERPL CALC-SCNC: 7 MMOL/L (ref 0–18)
BUN BLD-MCNC: 41 MG/DL (ref 9–23)
BUN/CREAT SERPL: 26.1 (ref 10–20)
CALCIUM BLD-MCNC: 9.1 MG/DL (ref 8.7–10.4)
CHLORIDE SERPL-SCNC: 108 MMOL/L (ref 98–112)
CO2 SERPL-SCNC: 25 MMOL/L (ref 21–32)
CREAT BLD-MCNC: 1.57 MG/DL (ref 0.7–1.3)
EGFRCR SERPLBLD CKD-EPI 2021: 43 ML/MIN/1.73M2 (ref 60–?)
EST. AVERAGE GLUCOSE BLD GHB EST-MCNC: 148 MG/DL (ref 68–126)
FASTING STATUS PATIENT QL REPORTED: YES
GLUCOSE BLD-MCNC: 101 MG/DL (ref 70–99)
HBA1C MFR BLD: 6.8 % (ref ?–5.7)
OSMOLALITY SERPL CALC.SUM OF ELEC: 300 MOSM/KG (ref 275–295)
POTASSIUM SERPL-SCNC: 5.2 MMOL/L (ref 3.5–5.1)
SODIUM SERPL-SCNC: 140 MMOL/L (ref 136–145)

## 2025-08-08 PROCEDURE — G2211 COMPLEX E/M VISIT ADD ON: HCPCS | Performed by: FAMILY MEDICINE

## 2025-08-08 PROCEDURE — 3078F DIAST BP <80 MM HG: CPT | Performed by: FAMILY MEDICINE

## 2025-08-08 PROCEDURE — 3008F BODY MASS INDEX DOCD: CPT | Performed by: FAMILY MEDICINE

## 2025-08-08 PROCEDURE — 3074F SYST BP LT 130 MM HG: CPT | Performed by: FAMILY MEDICINE

## 2025-08-08 PROCEDURE — 99214 OFFICE O/P EST MOD 30 MIN: CPT | Performed by: FAMILY MEDICINE

## 2025-08-08 PROCEDURE — 80048 BASIC METABOLIC PNL TOTAL CA: CPT

## 2025-08-08 PROCEDURE — 83036 HEMOGLOBIN GLYCOSYLATED A1C: CPT

## 2025-08-08 PROCEDURE — 36415 COLL VENOUS BLD VENIPUNCTURE: CPT

## 2025-08-08 RX ORDER — CARVEDILOL 6.25 MG
TABLET ORAL
COMMUNITY
Start: 2025-07-15

## (undated) DIAGNOSIS — R35.1 NOCTURIA: ICD-10-CM

## (undated) DIAGNOSIS — N40.1 BENIGN NON-NODULAR PROSTATIC HYPERPLASIA WITH LOWER URINARY TRACT SYMPTOMS: ICD-10-CM

## (undated) DIAGNOSIS — R97.20 ELEVATED PSA: ICD-10-CM

## (undated) DEVICE — SOL  .9 1000ML BAG

## (undated) DEVICE — CATH SECURING DEVICE STATLOCK

## (undated) DEVICE — Device: Brand: DEFENDO AIR/WATER/SUCTION AND BIOPSY VALVE

## (undated) DEVICE — GAMMEX® NON-LATEX SIZE 7.5, STERILE NEOPRENE POWDER-FREE SURGICAL GLOVE: Brand: GAMMEX

## (undated) DEVICE — CATH URTH LBRCTH 22FR LNG

## (undated) DEVICE — 35 ML SYRINGE REGULAR TIP: Brand: MONOJECT

## (undated) DEVICE — KNEE IMMOBILIZER: Brand: DEROYAL

## (undated) DEVICE — Device: Brand: CUSTOM PROCEDURE KIT

## (undated) DEVICE — NON-ADHERENT PAD PREPACK: Brand: TELFA

## (undated) DEVICE — HEXAGONAL CAPTIVATOR STIFF 13M

## (undated) DEVICE — CYSTO PACK: Brand: MEDLINE INDUSTRIES, INC.

## (undated) DEVICE — SOL  .9 3000ML

## (undated) DEVICE — STERILE SURGICAL LUBRICANT, METAL TUBE: Brand: SURGILUBE

## (undated) DEVICE — SOL H2O 1000ML BTL

## (undated) DEVICE — CAPTIVATOR COLD THIN WIRE 10MM ROUNDED

## (undated) DEVICE — LINE MNTR ADLT SET O2 INTMD

## (undated) DEVICE — MEDI-VAC NON-CONDUCTIVE SUCTION TUBING 6MM X 1.8M (6FT.) L: Brand: CARDINAL HEALTH

## (undated) DEVICE — Device

## (undated) DEVICE — STERILE LATEX POWDER-FREE SURGICAL GLOVESWITH NITRILE COATING: Brand: PROTEXIS

## (undated) DEVICE — REM POLYHESIVE ADULT PATIENT RETURN ELECTRODE: Brand: VALLEYLAB

## (undated) DEVICE — SCD SLEEVE KNEE HI BLEND

## (undated) DEVICE — CONMED SCOPE SAVER BITE BLOCK, 20X27 MM: Brand: SCOPE SAVER

## (undated) DEVICE — SNARE OPTMZ PLPCTM TRP

## (undated) DEVICE — IV SET ADMIN W/2 PORTS

## (undated) DEVICE — FIBER XPS MOXY

## (undated) DEVICE — UROLOGY DRAIN BAG

## (undated) DEVICE — URINE DRAINAGE BAG,NEEDLE SAMPLING, ANTI-REFLUX DEVICE, DRAIN TUBE: Brand: DOVER

## (undated) DEVICE — TECH FEE

## (undated) NOTE — LETTER
[place]  [address]  [address], [address] [facility]       10/03/17        Patient: Paulette Vale   YOB: 1940   Date of Visit: 10/3/2017       Dear  Dr. Yesenia Cunningham,      Thank you for referring Paulette Vale to my pra

## (undated) NOTE — MR AVS SNAPSHOT
[address], [address]  [address] [facility]  [phone]               Thank you for choosing us for your health care visit with Carolynn Garza DO.   We are glad to serve you and happy to provide you with this summary without long-term current use of insulin ([facility].) [E11.9]           CBC W Differential W Platelet [E]    Complete by:   Mar 30, 2017 (Approximate)    Assoc Dx:  Controlled type 2 diabetes mellitus without complication, without long-term current use of insulin TAKE 1 TABLET BY MOUTH DAILY WITH DINNER. What changed:  See the new instructions. Commonly known as:  GLUCOPHAGE           metoprolol Tartrate 25 MG Tabs   Take 0.5 tablets (12.5 mg total) by mouth 2 (two) times daily.    What changed:    - how much to Call [phone] for help. [facility] is NOT to be used for urgent needs. For medical emergencies, dial 911.         Educational Information     Healthy Diet and Regular Exercise  [employer] for making healthy food choices  -

## (undated) NOTE — LETTER
2/6/2020          To Whom It May Concern: Brianda Amezquita is currently under my medical care and cannot return to work at this time.   Please excuse Sol Sudha from 2/3/2020 until cleared to return by his primary care MD.    If you require additional inform

## (undated) NOTE — LETTER
[facility]  Administration of Anesthesia  1. Pj Hughes, or _________________________________ acting on his behalf, (Patient) (Dependent/Representative) request to receive anesthesia for my pending procedure/operation/treatment.   A 6. OBSTETRIC PATIENTS: Specific risks/consequences of spinal/epidural anesthesia may include itching, low blood pressure, difficulty urinating, slowing of the baby's heart rate and headache.  Rare risks include infections, high spinal block, spinal bleeding ___________________________________________________           _____________________________________________________  Date/Time                                                                                               Responsible person in case of minor

## (undated) NOTE — LETTER
[address] [facility] [address]         To whom it may concern,    Jose Ayala is currently a patient under my medical care. Patient has been off since the day of his surgery which was January 13, 2022. He has been cleared to go back to work and can return back on February 14, 2022 without restrictions. If you require additional information please do not hesitate to contact my office.         Sincerely,     Beatriz Urena DO  [facility], [facility], [address] [place] [address]  [phone]        Document electronically generated by:  Beatriz Urena DO

## (undated) NOTE — LETTER
[facility]  Administration of Anesthesia  1. Zulma Hawthorne, or _________________________________ acting on his behalf, (Patient) (Dependent/Representative) request to receive anesthesia for my pending procedure/operation/treatment.   A 6. OBSTETRIC PATIENTS: Specific risks/consequences of spinal/epidural anesthesia may include itching, low blood pressure, difficulty urinating, slowing of the baby's heart rate and headache.  Rare risks include infections, high spinal block, spinal bleeding ___________________________________________________           _____________________________________________________  Date/Time                                                                                               Responsible person in case of minor

## (undated) NOTE — LETTER
June 25, 2018         Gerald Faust DO  [address] [facility] [employer]      Patient: Jamilah Boykin   YOB: 1940   Date of Visit: 6/25/2018       Dear Dr. Darrel Romero,    I saw your patient, Jamilah Boykin, on 6/25/201

## (undated) NOTE — LETTER
2/6/2020          To Whom It May Concern: Farhana Varghese is currently under my medical care. Please excuse Juni Finley from 2/3/2020-2/6/2020. He may return to work on 2/7/2020. Activity is restricted as follows: none.     If you require additional infor

## (undated) NOTE — LETTER
2/7/2020              Bee Kapadia        [address] [place] [address] [address] [address]         To whom it may concern,    Bee Kapadia is currently a patient under my medical care. Patient was seen today and is doing well.   He can return back to work

## (undated) NOTE — LETTER
[address] [place] [address] Rd, [place], [place]     AUTHORIZATION FOR SURGICAL OPERATION OR PROCEDURE    I hereby authorize Dr. Skyler Coates MD, my Physician(s) and whomever may be designated as the doctor's Assistant, to perform the 4. I consent to the photographing of procedure(s) to be performed for the purposes of advancing medicine, science and/or education, provided my identity is not revealed.  If the procedure has been videotaped, the physician/surgeon will obtain the original v (Witness signature)                                                                                                  (Date)                                (Time)  STATEMENT OF PHYSICIAN My signature below affirms that prior to the time of the procedure;  I

## (undated) NOTE — MR AVS SNAPSHOT
[place]  [address]  [phone]               Thank you for choosing us for your health care visit with Colan Homans, MD.  We are glad to serve you and happy to provide you with this summ Today's Vital Signs     BP Pulse Height Weight BMI    116/67 mmHg 60 5' 8\" (1.727 m) 174 lb (78.926 kg) 26.46 kg/m2         Current Medications          This list is accurate as of: 3/1/17 11:14 AM.  Always use your most recent med list. office, you can view your past visit information in [facility] by going to Visits < Visit Summaries. [facility] questions? Call [phone] for help. [employer] is NOT to be used for urgent needs. For medical emergencies, dial 911.            Visit [facility]

## (undated) NOTE — LETTER
5/15/2021              Krystian Friend        [address]        [address] [address]         Dear Janiya Joaquin,      The report of the Biopsy done on 05/12/21 shows a Seborrheic keratosis, irritated .   This is a benign (not cancerous) growth, and requires